# Patient Record
Sex: FEMALE | Race: WHITE | NOT HISPANIC OR LATINO | Employment: OTHER | URBAN - METROPOLITAN AREA
[De-identification: names, ages, dates, MRNs, and addresses within clinical notes are randomized per-mention and may not be internally consistent; named-entity substitution may affect disease eponyms.]

---

## 2017-07-19 ENCOUNTER — OFFICE VISIT (OUTPATIENT)
Dept: ORTHOPEDICS | Facility: CLINIC | Age: 77
End: 2017-07-19

## 2017-07-19 DIAGNOSIS — M17.11 PRIMARY OSTEOARTHRITIS OF RIGHT KNEE: Primary | ICD-10-CM

## 2017-07-19 NOTE — NURSING NOTE
[address] [address]  Dept: [phone]  ______________________________________________________________________________    Patient: Jessica Ramsey   : 1940   MRN: [id number]   2017    INVASIVE PROCEDURE SAFETY CHECKLIST    Date: 2017   Procedure:right knee kenalog injection  Patient Name: Jessica Ramsey  MRN: [id number]  YOB: 1940    Action: Complete sections as appropriate. Any discrepancy results in a HARD COPY until resolved.     PRE PROCEDURE:  Patient ID verified with 2 identifiers (name and  or MRN): Yes  Procedure and site verified with patient/designee (when able): Yes  Accurate consent documentation in medical record: Yes  H&P (or appropriate assessment) documented in medical record: Yes  H&P must be up to 20 days prior to procedure and updates within 24 hours of procedure as applicable: NA  Relevant diagnostic and radiology test results appropriately labeled and displayed as applicable: Yes  Procedure site(s) marked with provider initials: NA    TIMEOUT:  Time-Out performed immediately prior to starting procedure, including verbal and active participation of all team members addressing the following:Yes  * Correct patient identify  * Confirmed that the correct side and site are marked  * An accurate procedure consent form  * Agreement on the procedure to be done  * Correct patient position  * Relevant images and results are properly labeled and appropriately displayed  * The need to administer antibiotics or fluids for irrigation purposes during the procedure as applicable   * Safety precautions based on patient history or medication use    DURING PROCEDURE: Verification of correct person, site, and procedures any time the responsibility for care of the patient is transferred to another member of the care team.     The following medication was given:     MEDICATION:  Kenalog 40 mg  ROUTE:  intra-articular  SITE: right knee  DOSE: 40mg/ml  LOT #: [id number]  : [employer]  EXPIRATION DATE: 12/18  NDC#: 2690-9400-87   Was there drug waste? No    MEDICATION:  Lidocaine without epinephrine  ROUTE: intra-articular  SITE: right knee  DOSE: 200mg/20ml  LOT #: [id number]  : [employer]  EXPIRATION DATE: 03/21  NDC#: 05562-786-98   Was there drug waste? Yes  Amount of drug waste (mL): 16ml.  Reason for waste:  Single use vial    Vanesa Durán LPN  July 19, 2017

## 2017-07-19 NOTE — LETTER
7/19/2017      RE: Jessica Ramsey  [address]  [facility] [address]        Subjective:   Jessica Ramsey is a 76 year old female who reports baker's cyst, right knee pain.  Ibuprofen helps.  Usually better with Tylenol.  Pain with walking, now improving.    Background:   Date of injury: no injury  Duration of symptoms: 3 years  Mechanism of Injury: Chronic; Activity Related   Intensity: 1/10 at rest, 6/10 with activity   Aggravating factors: walking long distance  Relieving Factors: rest and ice  Prior Evaluation: X-rays and Injection    PAST MEDICAL, SOCIAL, SURGICAL AND FAMILY HISTORY: She  has a past medical history of Arthritis; Asthma; Degenerative joint disease; Depression; Hoarseness; Hypertension; Indigestion; Nasal congestion; Night sweats; Numbness; Pneumonia; Ringing in ears; Sleep problems; Snoring; Sore throat; Trouble swallowing; Unspecified cerebral artery occlusion with cerebral infarction; Weakness; and Weight gain. She also has no past medical history of Bleeding disorder (H); Cancer (H); Chronic kidney disease; Diabetes (H); Heart disease; Hepatitis; or Surgical complication.  She  has a past surgical history that includes Hysterectomy; orthopedic surgery; SACROPLASTY; STOMACH SURGERY PROCEDURE UNLISTED; and back surgery.  Her family history includes Asthma in her father; CEREBROVASCULAR DISEASE in her father; Low Back Problems in an other family member; Obesity in her father.  She reports that she has quit smoking. Her smoking use included Cigarettes. She has a 18.00 pack-year smoking history. She has never used smokeless tobacco. She reports that she does not drink alcohol or use illicit drugs.      ALLERGIES: She is allergic to lyrica [pregabalin]; niacin; sulfa drugs; and tramadol.    CURRENT MEDICATIONS: She has a current medication list which includes the following prescription(s): escitalopram oxalate, coenzyme q10, calcium & magnesium carbonates, vitamin d, tiotropium bromide  monohydrate, ibuprofen, multiple vitamins-minerals, naproxen, trazodone, order for dme, albuterol, losartan, simvastatin, omeprazole, albuterol, acetaminophen, albuterol, and aspirin.     REVIEW OF SYSTEMS: 10 point review of systems is negative except as noted above.     Exam:   There were no vitals taken for this visit.           CONSTITUTIONAL: healthy, alert, no distress and cooperative  HEAD: Normocephalic. No masses, lesions, tenderness or abnormalities  SKIN: no suspicious lesions or rashes  GAIT: normal  NEUROLOGIC: Non-focal, Normal muscle tone and strength, reflexes normal, sensation grossly normal.  PSYCHIATRIC: affect normal/bright and mentation appears normal.    MUSCULOSKELETAL: right knee pain      Inspection:       AP/lateral alignment normal  Tender: medial joint line, posterior knee, posterior calf      Non-tender: patellar facets, MCL, LCL, lateral joint line, IT band, posterior knee   Active Range of Motion: all normal  Strength: quad  5/5, Hamstrings  5/5, Gastroc  5/5, Tibialis anterior  5-/5, Peroneals  5-/5 and core strength  5/5 hip abductors and other core muscles   Special tests: normal Valgus stress test, normal Varus, negative Lachman's test, negative Edward's, no apprehension with lateral stress of the patella.    Procedure: risks and benefits discussed, consented, 1 cc 40mg kenalog, 4 cc 1% lidocaine, lateral approach, right knee injection, no bleeding or complications, some relief post-injection, band aid     Assessment/Plan:   Pt is a 75 yo obese white female with PMHx of asthma, depression presenting with right knee pain  1. Right knee OA- injection today, pt to continue with LE strengthening, trying to avoid surgery at this point  Has an appt with Dr. Fernandes in August.  Discussion regarding trial of Synvisc.  Pt to check with insurance to see if it's covered.  RTC August    X-RAY INTERPRETATION:   X-Ray of the Right Knee: 3-view, Quintero, lateral, sunrise   ordered and  interpreted in the office today was positive for Impression:  1. Right mechanical axis angle measures 2 degrees and left measures 3  degrees.  2. Weight bearing axis as noted above.  3. Moderate grade osteoarthritis of bilateral knees most predominantly  in the medial femorotibial joint compartment,.       Carmelina Franco MD

## 2017-07-19 NOTE — MR AVS SNAPSHOT
After Visit Summary   7/19/2017    Jessica Ramsey    MRN: [id number]           Patient Information     Date Of Birth          1940        Visit Information        Provider Department      7/19/2017 11:00 AM Carmelina Franco MD [facility]        Today's Diagnoses     Primary osteoarthritis of right knee    -  1       Follow-ups after your visit        Follow-up notes from your care team     Return in about 2 years (around 7/19/2019), or if symptoms worsen or fail to improve.      Your next 10 appointments already scheduled     Aug 25, 2017  2:00 PM CDT   (Arrive by 1:45 PM)   Return Visit with Jill Fernandes MD   [facility] ([facility])    [address] [address] [phone]              Who to contact     Please call your clinic at [phone] to:    Ask questions about your health    Make or cancel appointments    Discuss your medicines    Learn about your test results    Speak to your doctor   If you have compliments or concerns about an experience at your clinic, or if you wish to file a complaint, please contact [facility] Patient Relations at [phone] or email us at [email or web address]         Additional Information About Your Visit        MyChart Information     StatusNet gives you secure access to your electronic health record. If you see a primary care provider, you can also send messages to your care team and make appointments. If you have questions, please call your primary care clinic.  If you do not have a primary care provider, please call [phone] and they will assist you.      StatusNet is an electronic gateway that provides easy, online access to your medical records. With StatusNet, you can request a clinic appointment, read your test results, renew a prescription or communicate with your care team.     To access your  existing account, please contact your [facility] or call [phone] for assistance.        Care EveryWhere ID     This is your Care EveryWhere ID. This could be used by other organizations to access your [facility] medical records  [id number]         Blood Pressure from Last 3 Encounters:   04/13/15 118/78   03/17/15 122/86   03/09/15 125/85    Weight from Last 3 Encounters:   11/30/16 200 lb (90.7 kg)   11/11/15 229 lb (103.9 kg)   10/28/15 229 lb (103.9 kg)              We Performed the Following     Large Joint/Bursa injection and/or drainage - Unilateral (Shoulder, Knee) [20610]     TRIAMCINOLONE ACET INJ NOS          Today's Medication Changes          These changes are accurate as of: 7/19/17 11:59 PM.  If you have any questions, ask your nurse or doctor.               Start taking these medicines.        Dose/Directions    triamcinolone acetonide 40 MG/ML injection   Commonly known as:  KENALOG   Used for:  Primary osteoarthritis of right knee   Started by:  Carmelina Franco MD        Dose:  40 mg   1 mL (40 mg) by INTRA-ARTICULAR route once for 1 dose   Quantity:  1 mL   Refills:  0         Stop taking these medicines if you haven't already. Please contact your care team if you have questions.     celeXA 20 MG tablet   Generic drug:  citalopram   Stopped by:  Carmelina Franco MD                Where to get your medicines      Some of these will need a paper prescription and others can be bought over the counter.  Ask your nurse if you have questions.     You don't need a prescription for these medications     triamcinolone acetonide 40 MG/ML injection                Primary Care Provider Office Phone # Fax #    Suraj Espinoza [phone]       [facility] [address] [facility] [address]        Equal Access to Services     DEBORAH LOCKHART : Rich Puentes, tali olmos  shante navarroisidro fernando'aan ah. So [facility] [phone].    ATENCIÓN: Si luisla jaswant, tiene a oscar disposición servicios gratuitos de asistencia lingüística. Radha al [phone].    We comply with applicable federal civil rights laws and [place] laws. We do not discriminate on the basis of race, color, national origin, age, disability sex, sexual orientation or gender identity.            Thank you!     Thank you for choosing [facility]  for your care. Our goal is always to provide you with excellent care. Hearing back from our patients is one way we can continue to improve our services. Please take a few minutes to complete the written survey that you may receive in the mail after your visit with us. Thank you!             Your Updated Medication List - Protect others around you: Learn how to safely use, store and throw away your medicines at www.disposemymeds.org.          This list is accurate as of: 7/19/17 11:59 PM.  Always use your most recent med list.                   Brand Name Dispense Instructions for use Diagnosis    acetaminophen 650 MG CR tablet    TYLENOL     Take 650 mg by mouth 4 times daily as needed.        * albuterol 108 (90 BASE) MCG/ACT Inhaler   Generic drug:  albuterol     2 Inhaler    INHALE 1 TO 2 PUFFS EVERY 4 TO 6 HOURS AS NEEDED    Bronchospasm       * albuterol 0.63 MG/3ML nebulizer solution    ACCUNEB    1 Box    Take 3 mLs by nebulization every 6 hours as needed for shortness of breath / dyspnea. Use 1 unit dose in nebulizer every 4-6 hours as needed    COPD (chronic obstructive pulmonary disease) (H)       * albuterol (2.5 MG/3ML) 0.083% neb solution     30 vial    Take 3 mLs by nebulization every 6 hours as needed for shortness of breath / dyspnea for 30 doses.    Wheezing       aspirin 325 MG tablet      Take 325 mg by mouth daily.        CALCIUM & MAGNESIUM CARBONATES PO           COENZYME Q-10 PO           IBUPROFEN PO      Take 200 mg by  mouth Take three tablets as needed for pain, once or twice a day everyday        LEXAPRO PO      Take 20 mg by mouth daily        losartan 25 MG tablet    COZAAR    90 tablet    Take 1 tablet by mouth daily.    Unspecified essential hypertension       naproxen 500 MG tablet    NAPROSYN    60 tablet    Take 1 tablet (500 mg) by mouth 2 times daily (with meals)    Right shoulder pain       omeprazole 20 MG CR capsule    priLOSEC    180 capsule    Take 1 capsule by mouth 2 times daily.    Esophageal reflux       order for DME     1 each    Equipment being ordered: Nebulizer    Wheezing       PRESERVISION AREDS PO      Take 1 tablet by mouth daily        simvastatin 40 MG tablet    ZOCOR    90 tablet    Take 1 tablet by mouth At Bedtime.    Other and unspecified hyperlipidemia       SPIRIVA HANDIHALER IN      Once daily inhale into lungs        traZODone 100 MG tablet    DESYREL    30 tablet    Take 1 tablet by mouth nightly as needed for sleep. As directed    Insomnia, unspecified       triamcinolone acetonide 40 MG/ML injection    KENALOG    1 mL    1 mL (40 mg) by INTRA-ARTICULAR route once for 1 dose    Primary osteoarthritis of right knee       vitamin D 68186 UNIT capsule    ERGOCALCIFEROL    12 capsule    Take 1 capsule (50,000 Units) by mouth once a week    Vitamin D deficiency disease       * Notice:  This list has 3 medication(s) that are the same as other medications prescribed for you. Read the directions carefully, and ask your doctor or other care provider to review them with you.

## 2017-07-19 NOTE — PROGRESS NOTES
Subjective:   Jessica Ramsey is a 76 year old female who reports baker's cyst, right knee pain.  Ibuprofen helps.  Usually better with Tylenol.  Pain with walking, now improving.    Background:   Date of injury: no injury  Duration of symptoms: 3 years  Mechanism of Injury: Chronic; Activity Related   Intensity: 1/10 at rest, 6/10 with activity   Aggravating factors: walking long distance  Relieving Factors: rest and ice  Prior Evaluation: X-rays and Injection    PAST MEDICAL, SOCIAL, SURGICAL AND FAMILY HISTORY: She  has a past medical history of Arthritis; Asthma; Degenerative joint disease; Depression; Hoarseness; Hypertension; Indigestion; Nasal congestion; Night sweats; Numbness; Pneumonia; Ringing in ears; Sleep problems; Snoring; Sore throat; Trouble swallowing; Unspecified cerebral artery occlusion with cerebral infarction; Weakness; and Weight gain. She also has no past medical history of Bleeding disorder (H); Cancer (H); Chronic kidney disease; Diabetes (H); Heart disease; Hepatitis; or Surgical complication.  She  has a past surgical history that includes Hysterectomy; orthopedic surgery; SACROPLASTY; STOMACH SURGERY PROCEDURE UNLISTED; and back surgery.  Her family history includes Asthma in her father; CEREBROVASCULAR DISEASE in her father; Low Back Problems in an other family member; Obesity in her father.  She reports that she has quit smoking. Her smoking use included Cigarettes. She has a 18.00 pack-year smoking history. She has never used smokeless tobacco. She reports that she does not drink alcohol or use illicit drugs.      ALLERGIES: She is allergic to lyrica [pregabalin]; niacin; sulfa drugs; and tramadol.    CURRENT MEDICATIONS: She has a current medication list which includes the following prescription(s): escitalopram oxalate, coenzyme q10, calcium & magnesium carbonates, vitamin d, tiotropium bromide monohydrate, ibuprofen, multiple vitamins-minerals, naproxen, trazodone, order for dme,  albuterol, losartan, simvastatin, omeprazole, albuterol, acetaminophen, albuterol, and aspirin.     REVIEW OF SYSTEMS: 10 point review of systems is negative except as noted above.     Exam:   There were no vitals taken for this visit.           CONSTITUTIONAL: healthy, alert, no distress and cooperative  HEAD: Normocephalic. No masses, lesions, tenderness or abnormalities  SKIN: no suspicious lesions or rashes  GAIT: normal  NEUROLOGIC: Non-focal, Normal muscle tone and strength, reflexes normal, sensation grossly normal.  PSYCHIATRIC: affect normal/bright and mentation appears normal.    MUSCULOSKELETAL: right knee pain      Inspection:       AP/lateral alignment normal  Tender: medial joint line, posterior knee, posterior calf      Non-tender: patellar facets, MCL, LCL, lateral joint line, IT band, posterior knee   Active Range of Motion: all normal  Strength: quad  5/5, Hamstrings  5/5, Gastroc  5/5, Tibialis anterior  5-/5, Peroneals  5-/5 and core strength  5/5 hip abductors and other core muscles   Special tests: normal Valgus stress test, normal Varus, negative Lachman's test, negative Edward's, no apprehension with lateral stress of the patella.    Procedure: risks and benefits discussed, consented, 1 cc 40mg kenalog, 4 cc 1% lidocaine, lateral approach, right knee injection, no bleeding or complications, some relief post-injection, band aid     Assessment/Plan:   Pt is a 77 yo obese white female with PMHx of asthma, depression presenting with right knee pain  1. Right knee OA- injection today, pt to continue with LE strengthening, trying to avoid surgery at this point  Has an appt with Dr. Fernandes in August.  Discussion regarding trial of Synvisc.  Pt to check with insurance to see if it's covered.  RTC August    X-RAY INTERPRETATION:   X-Ray of the Right Knee: 3-view, Quintero, lateral, sunrise   ordered and interpreted in the office today was positive for Impression:  1. Right mechanical axis angle  measures 2 degrees and left measures 3  degrees.  2. Weight bearing axis as noted above.  3. Moderate grade osteoarthritis of bilateral knees most predominantly  in the medial femorotibial joint compartment,.

## 2017-07-20 RX ORDER — TRIAMCINOLONE ACETONIDE 40 MG/ML
40 INJECTION, SUSPENSION INTRA-ARTICULAR; INTRAMUSCULAR ONCE
Qty: 1 ML | Refills: 0 | OUTPATIENT
Start: 2017-07-20 | End: 2017-07-20

## 2017-08-08 ENCOUNTER — TRANSFERRED RECORDS (OUTPATIENT)
Dept: HEALTH INFORMATION MANAGEMENT | Facility: CLINIC | Age: 77
End: 2017-08-08

## 2017-08-12 ENCOUNTER — HEALTH MAINTENANCE LETTER (OUTPATIENT)
Age: 77
End: 2017-08-12

## 2017-08-25 ENCOUNTER — OFFICE VISIT (OUTPATIENT)
Dept: ORTHOPEDICS | Facility: CLINIC | Age: 77
End: 2017-08-25

## 2017-08-25 VITALS — WEIGHT: 225 LBS | HEIGHT: 64 IN | BODY MASS INDEX: 38.41 KG/M2

## 2017-08-25 DIAGNOSIS — M17.12 TRICOMPARTMENT OSTEOARTHRITIS OF LEFT KNEE: Primary | ICD-10-CM

## 2017-08-25 RX ORDER — TRIAMCINOLONE ACETONIDE 40 MG/ML
40 INJECTION, SUSPENSION INTRA-ARTICULAR; INTRAMUSCULAR ONCE
Qty: 1 ML | Refills: 0 | OUTPATIENT
Start: 2017-08-25 | End: 2017-08-25

## 2017-08-25 NOTE — PROGRESS NOTES
"ANNMARIE Lopez is a 76-year-old female with a known past medical history significant for bilateral tricompartmental osteoarthritis and presents to clinic with worsened left knee pain ×2 weeks. She states 2 weeks ago she was in her usual state of health walking up the stairs when she fell backwards forcibly planting her left leg and experiencing knee pain since that time. She was evaluated at [facility] orthopedics including left knee x-rays which showed no acute bony abnormality. Ligamentous structures were intact. She was released to home. Since that time she has been taking a small amount of ibuprofen and Tylenol in the morning with minimal improvement of pain. The knee swelling has improved but she continues to have pain with activities of daily living and normal ambulation especially with getting up and down from a seated position. She's not had any locking or catching or weakness. No bruising or redness. She denies any hip pain.    Medical History     Past Surgical History:   Procedure Laterality Date     BACK SURGERY       C STOMACH SURGERY PROCEDURE UNLISTED       HC SACROPLASTY       HYSTERECTOMY       ORTHOPEDIC SURGERY         Past Medical History:   Diagnosis Date     Arthritis      Asthma      Degenerative joint disease      Depression      Hoarseness      Hypertension      Indigestion      Nasal congestion      Night sweats      Numbness      Pneumonia      Ringing in ears      Sleep problems      Snoring      Sore throat      Trouble swallowing      Unspecified cerebral artery occlusion with cerebral infarction      Weakness      Weight gain        Allergies   Allergen Reactions     Lyrica [Pregabalin]      \"felt like I was high and wanted to sleep a lot and did not help with the pain.\"     Niacin      Rapid heartbeat     Sulfa Drugs      Unknown     Tramadol      Dizziness        Social History     Social History     Marital status:      Spouse name: N/A     Number of children: N/A     Years of " "education: N/A     Occupational History     Not on file.     Social History Main Topics     Smoking status: Former Smoker     Packs/day: 1.00     Years: 18.00     Types: Cigarettes     Smokeless tobacco: Never Used     Alcohol use No     Drug use: No     Sexual activity: No     Other Topics Concern     Not on file     Social History Narrative       Family History   Problem Relation Age of Onset     Asthma Father      CEREBROVASCULAR DISEASE Father      Obesity Father      Low Back Problems Other               Current Outpatient Prescriptions   Medication     Escitalopram Oxalate (LEXAPRO PO)     COENZYME Q-10 PO     CALCIUM & MAGNESIUM CARBONATES PO     vitamin D (ERGOCALCIFEROL) 20242 UNIT capsule     Tiotropium Bromide Monohydrate (SPIRIVA HANDIHALER IN)     IBUPROFEN PO     Multiple Vitamins-Minerals (PRESERVISION AREDS PO)     naproxen (NAPROSYN) 500 MG tablet     traZODone (DESYREL) 100 MG tablet     ORDER FOR DME     albuterol (2.5 MG/3ML) 0.083% nebulizer solution     losartan (COZAAR) 25 MG tablet     simvastatin (ZOCOR) 40 MG tablet     omeprazole (PRILOSEC) 20 MG capsule     albuterol (ACCUNEB) 0.63 MG/3ML nebulizer solution     acetaminophen (TYLENOL) 650 MG CR tablet     PROAIR  (90 BASE) MCG/ACT IN AERS     aspirin 325 MG tablet     No current facility-administered medications for this visit.            Objective Findings     Vitals:    08/25/17 1356   Weight: 225 lb (102.1 kg)   Height: 5' 4\" (1.626 m)         Physical Exam:  Gen: A&O in NAD  CV: regular rate, extremities well perfused x4  Pulm: regular rate, without respiratory distress  Derm: no rashes or lesions  Psych: normal affect    MSK: Left knee without obvious effusion, redness or deformity. No bruising. Moderate tenderness to palpation over bilateral joint lines, right greater than left.. No significant tenderness over the patellar or quadriceps tendon, has anserine bursa, fibular head, MCL, or anterior cruciate ligament. Mild " crepitus with patellar grind. Flexion is limited on the right to 110   with 0  of flexion bilaterally.  Patient has 5/5 strength bilaterally. Firm ligamentous endpoints bilaterally.    Procedure Note  Procedure/Location: Left knee corticosteroid injection  Approach: Anterior lateral approach  Prior to procedure, risks and benefits explained to patient and written consent obtained.  Injectate: 1 mL Kenalog, 4 mL Lidocaine 1% w/o epinephrine using a 22 g needle  Prior to injection, skin site was sterilized.  Injection was then administered in sterile fashion without complication.  Pt experienced moderate relief of symptoms immediately following procedure.  Bandage was placed over site.  Post procedure instructions discussed with patient.        Assessment / Plan     1) left knee pain and swelling: Likely due to exacerbation of underlying moderate to severe osteoarthritis  - Corticosteroid induction as above  - Recommend physical therapy  - Recommend naproxen 440 mg twice a day  - RTC in 6 weeks if no improvement  - Could consider Synvisc injection in the future if clinically indicated     Patient seen and discussed with MD Zbigniew Onofre MD  Primary Care Sports Medicine Fellow

## 2017-08-25 NOTE — LETTER
"  8/25/2017      RE: Jessica Ramsey  [address]  [facility] [address]       [facility]      Jessica is a 76-year-old female with a known past medical history significant for bilateral tricompartmental osteoarthritis and presents to clinic with worsened left knee pain ×2 weeks. She states 2 weeks ago she was in her usual state of health walking up the stairs when she fell backwards forcibly planting her left leg and experiencing knee pain since that time. She was evaluated at [facility] orthopedics including left knee x-rays which showed no acute bony abnormality. Ligamentous structures were intact. She was released to home. Since that time she has been taking a small amount of ibuprofen and Tylenol in the morning with minimal improvement of pain. The knee swelling has improved but she continues to have pain with activities of daily living and normal ambulation especially with getting up and down from a seated position. She's not had any locking or catching or weakness. No bruising or redness. She denies any hip pain.    Medical History     Past Surgical History:   Procedure Laterality Date     BACK SURGERY       C STOMACH SURGERY PROCEDURE UNLISTED       HC SACROPLASTY       HYSTERECTOMY       ORTHOPEDIC SURGERY         Past Medical History:   Diagnosis Date     Arthritis      Asthma      Degenerative joint disease      Depression      Hoarseness      Hypertension      Indigestion      Nasal congestion      Night sweats      Numbness      Pneumonia      Ringing in ears      Sleep problems      Snoring      Sore throat      Trouble swallowing      Unspecified cerebral artery occlusion with cerebral infarction      Weakness      Weight gain        Allergies   Allergen Reactions     Lyrica [Pregabalin]      \"felt like I was high and wanted to sleep a lot and did not help with the pain.\"     Niacin      Rapid heartbeat     Sulfa Drugs      Unknown     Tramadol      Dizziness        Social History     Social History     " "Marital status:      Spouse name: N/A     Number of children: N/A     Years of education: N/A     Occupational History     Not on file.     Social History Main Topics     Smoking status: Former Smoker     Packs/day: 1.00     Years: 18.00     Types: Cigarettes     Smokeless tobacco: Never Used     Alcohol use No     Drug use: No     Sexual activity: No     Other Topics Concern     Not on file     Social History Narrative       Family History   Problem Relation Age of Onset     Asthma Father      CEREBROVASCULAR DISEASE Father      Obesity Father      Low Back Problems Other               Current Outpatient Prescriptions   Medication     Escitalopram Oxalate (LEXAPRO PO)     COENZYME Q-10 PO     CALCIUM & MAGNESIUM CARBONATES PO     vitamin D (ERGOCALCIFEROL) 37296 UNIT capsule     Tiotropium Bromide Monohydrate (SPIRIVA HANDIHALER IN)     IBUPROFEN PO     Multiple Vitamins-Minerals (PRESERVISION AREDS PO)     naproxen (NAPROSYN) 500 MG tablet     traZODone (DESYREL) 100 MG tablet     ORDER FOR DME     albuterol (2.5 MG/3ML) 0.083% nebulizer solution     losartan (COZAAR) 25 MG tablet     simvastatin (ZOCOR) 40 MG tablet     omeprazole (PRILOSEC) 20 MG capsule     albuterol (ACCUNEB) 0.63 MG/3ML nebulizer solution     acetaminophen (TYLENOL) 650 MG CR tablet     PROAIR  (90 BASE) MCG/ACT IN AERS     aspirin 325 MG tablet     No current facility-administered medications for this visit.            Objective Findings     Vitals:    08/25/17 1356   Weight: 225 lb (102.1 kg)   Height: 5' 4\" (1.626 m)         Physical Exam:  Gen: A&O in NAD  CV: regular rate, extremities well perfused x4  Pulm: regular rate, without respiratory distress  Derm: no rashes or lesions  Psych: normal affect    MSK: Left knee without obvious effusion, redness or deformity. No bruising. Moderate tenderness to palpation over bilateral joint lines, right greater than left.. No significant tenderness over the patellar or quadriceps " tendon, has anserine bursa, fibular head, MCL, or anterior cruciate ligament. Mild crepitus with patellar grind. Flexion is limited on the right to 110    with 0  of flexion bilaterally.  Patient has 5/5 strength bilaterally. Firm ligamentous endpoints bilaterally.    Procedure Note  Procedure/Location: Left knee corticosteroid injection  Approach: Anterior lateral approach  Prior to procedure, risks and benefits explained to patient and written consent obtained.  Injectate: 1 mL Kenalog, 4 mL Lidocaine 1% w/o epinephrine using a 22 g needle  Prior to injection, skin site was sterilized.  Injection was then administered in sterile fashion without complication.  Pt experienced moderate relief of symptoms immediately following procedure.  Bandage was placed over site.  Post procedure instructions discussed with patient.        Assessment / Plan     1) left knee pain and swelling: Likely due to exacerbation of underlying moderate to severe osteoarthritis  - Corticosteroid induction as above  - Recommend physical therapy  - Recommend naproxen 440 mg twice a day  - RTC in 6 weeks if no improvement  - Could consider Synvisc injection in the future if clinically indicated     Patient seen and discussed with MD Zbigniew Onofre MD  Primary Care Sports Medicine Fellow      Attending Note:   I have personally examined this patient and have reviewed the clinical presentation and progress note with the fellow. I agree with the treatment plan as outlined. The plan was formulated with the fellow on the day of the patient's visit. I have reviewed all imaging with the fellow and agree with the findings in the documentation.  I have supervised the injection performed by Dr. Nick.      Jill Fernandes MD, CAQ, CCD  [facility]  Sports Medicine and Bone Health    Jill Fernandes MD

## 2017-08-25 NOTE — MR AVS SNAPSHOT
After Visit Summary   8/25/2017    Jessica Ramsey    MRN: [id number]           Patient Information     Date Of Birth          1940        Visit Information        Provider Department      8/25/2017 2:00 PM Jill Fernandes MD [facility] Sports Medicine        Today's Diagnoses     Tricompartment osteoarthritis of left knee    -  1       Follow-ups after your visit        Additional Services     PHYSICAL THERAPY REFERRAL (Internal)       Physical Therapy Referral                  Who to contact     Please call your clinic at [phone] to:    Ask questions about your health    Make or cancel appointments    Discuss your medicines    Learn about your test results    Speak to your doctor   If you have compliments or concerns about an experience at your clinic, or if you wish to file a complaint, please contact [facility] Patient Relations at [phone] or email us at [email or web address]         Additional Information About Your Visit        MyChart Information     [employer] gives you secure access to your electronic health record. If you see a primary care provider, you can also send messages to your care team and make appointments. If you have questions, please call your primary care clinic.  If you do not have a primary care provider, please call [phone] and they will assist you.      [employer] is an electronic gateway that provides easy, online access to your medical records. With [employer], you can request a clinic appointment, read your test results, renew a prescription or communicate with your care team.     To access your existing account, please contact your [facility] or call [phone] for assistance.        Care EveryWhere ID     This is your Care EveryWhere ID. This could be used by other organizations to access your [facility] medical records  [id number]        Your Vitals Were     Height BMI (Body  "Mass Index)                1.626 m (5' 4\") 38.62 kg/m2           Blood Pressure from Last 3 Encounters:   04/13/15 118/78   03/17/15 122/86   03/09/15 125/85    Weight from Last 3 Encounters:   08/25/17 102.1 kg (225 lb)   11/30/16 90.7 kg (200 lb)   11/11/15 103.9 kg (229 lb)              We Performed the Following     Large Joint/Bursa injection and/or drainage - Unilateral (Shoulder, Knee) [20610]     PHYSICAL THERAPY REFERRAL (Internal)     TRIAMCINOLONE ACET INJ NOS          Today's Medication Changes          These changes are accurate as of: 8/25/17 11:59 PM.  If you have any questions, ask your nurse or doctor.               Start taking these medicines.        Dose/Directions    triamcinolone acetonide 40 MG/ML injection   Commonly known as:  KENALOG   Used for:  Tricompartment osteoarthritis of left knee   Started by:  Jill Fernandes MD        Dose:  40 mg   1 mL (40 mg) by INTRA-ARTICULAR route once for 1 dose   Quantity:  1 mL   Refills:  0            Where to get your medicines      Some of these will need a paper prescription and others can be bought over the counter.  Ask your nurse if you have questions.     You don't need a prescription for these medications     triamcinolone acetonide 40 MG/ML injection                Primary Care Provider Office Phone # Fax #    Suraj Espinoza [phone]       [facility] [address] [address]        Equal Access to Services     DEBORAH LOCKHART AH: Hadii luci ku hadasho Soomaali, waaxda luqadaha, qaybta kaalmada adeegyada, waxay jose berg. So [facility] [phone].    ATENCIÓN: Si habla chavaañol, tiene a oscar disposición servicios gratuitos de asistencia lingüística. Llame al [phone].    We comply with applicable federal civil rights laws and [place] laws. We do not discriminate on the basis of race, color, national origin, age, disability sex, sexual orientation or " gender identity.            Thank you!     Thank you for choosing [facility]  for your care. Our goal is always to provide you with excellent care. Hearing back from our patients is one way we can continue to improve our services. Please take a few minutes to complete the written survey that you may receive in the mail after your visit with us. Thank you!             Your Updated Medication List - Protect others around you: Learn how to safely use, store and throw away your medicines at www.disposemymeds.org.          This list is accurate as of: 8/25/17 11:59 PM.  Always use your most recent med list.                   Brand Name Dispense Instructions for use Diagnosis    acetaminophen 650 MG CR tablet    TYLENOL     Take 650 mg by mouth 4 times daily as needed.        aspirin 325 MG tablet      Take 325 mg by mouth daily.        CALCIUM & MAGNESIUM CARBONATES PO           COENZYME Q-10 PO           IBUPROFEN PO      Take 200 mg by mouth Take three tablets as needed for pain, once or twice a day everyday        LEXAPRO PO      Take 20 mg by mouth daily        losartan 25 MG tablet    COZAAR    90 tablet    Take 1 tablet by mouth daily.    Unspecified essential hypertension       naproxen 500 MG tablet    NAPROSYN    60 tablet    Take 1 tablet (500 mg) by mouth 2 times daily (with meals)    Right shoulder pain       omeprazole 20 MG CR capsule    priLOSEC    180 capsule    Take 1 capsule by mouth 2 times daily.    Esophageal reflux       order for DME     1 each    Equipment being ordered: Nebulizer    Wheezing       PRESERVISION AREDS PO      Take 1 tablet by mouth daily        * PROAIR  (90 BASE) MCG/ACT Inhaler   Generic drug:  albuterol     2 Inhaler    INHALE 1 TO 2 PUFFS EVERY 4 TO 6 HOURS AS NEEDED    Bronchospasm       * albuterol 0.63 MG/3ML nebulizer solution    ACCUNEB    1 Box    Take 3 mLs by nebulization every 6 hours as needed for shortness of breath / dyspnea. Use 1 unit dose in  nebulizer every 4-6 hours as needed    COPD (chronic obstructive pulmonary disease) (H)       * albuterol (2.5 MG/3ML) 0.083% neb solution     30 vial    Take 3 mLs by nebulization every 6 hours as needed for shortness of breath / dyspnea for 30 doses.    Wheezing       simvastatin 40 MG tablet    ZOCOR    90 tablet    Take 1 tablet by mouth At Bedtime.    Other and unspecified hyperlipidemia       SPIRIVA HANDIHALER IN      Once daily inhale into lungs        traZODone 100 MG tablet    DESYREL    30 tablet    Take 1 tablet by mouth nightly as needed for sleep. As directed    Insomnia, unspecified       triamcinolone acetonide 40 MG/ML injection    KENALOG    1 mL    1 mL (40 mg) by INTRA-ARTICULAR route once for 1 dose    Tricompartment osteoarthritis of left knee       vitamin D 76260 UNIT capsule    ERGOCALCIFEROL    12 capsule    Take 1 capsule (50,000 Units) by mouth once a week    Vitamin D deficiency disease       * Notice:  This list has 3 medication(s) that are the same as other medications prescribed for you. Read the directions carefully, and ask your doctor or other care provider to review them with you.

## 2017-08-25 NOTE — NURSING NOTE
[address] [address]  Dept: [phone]  ______________________________________________________________________________    Patient: Jessica Ramsey   : 1940   MRN: [id number]   2017    INVASIVE PROCEDURE SAFETY CHECKLIST    Date: 2017   Procedure: Left knee CSI with a Kenalog   Patient Name: Jessica Ramsey  MRN: [id number]  YOB: 1940    Action: Complete sections as appropriate. Any discrepancy results in a HARD COPY until resolved.     PRE PROCEDURE:  Patient ID verified with 2 identifiers (name and  or MRN): Yes  Procedure and site verified with patient/designee (when able): Yes  Accurate consent documentation in medical record: Yes  H&P (or appropriate assessment) documented in medical record: Yes  H&P must be up to 20 days prior to procedure and updates within 24 hours of procedure as applicable: NA  Relevant diagnostic and radiology test results appropriately labeled and displayed as applicable: Yes  Procedure site(s) marked with provider initials: NA    TIMEOUT:  Time-Out performed immediately prior to starting procedure, including verbal and active participation of all team members addressing the following:Yes  * Correct patient identify  * Confirmed that the correct side and site are marked  * An accurate procedure consent form  * Agreement on the procedure to be done  * Correct patient position  * Relevant images and results are properly labeled and appropriately displayed  * The need to administer antibiotics or fluids for irrigation purposes during the procedure as applicable   * Safety precautions based on patient history or medication use    DURING PROCEDURE: Verification of correct person, site, and procedures any time the responsibility for care of the patient is transferred to another member of the care team.     The following medication was given:     MEDICATION:  Kenalog 40 mg  ROUTE:  Intraarticular  SITE: Left knee  DOSE: 1cc  LOT #: [id number]  : [employer]  EXPIRATION DATE: FEB2019  NDC#: 0155-7305-93   Was there drug waste? No     MEDICATION:  Lidocaine without epinephrine  ROUTE: Intraarticular  SITE: Left knee  DOSE: 4cc  LOT #: [id number]  : [employer]  EXPIRATION DATE: 03/21  NDC#: 58207-191-71   Was there drug waste? Yes  Amount of drug waste (mL): 16.  Reason for waste:  Single use vial      Rosa Cornelius, ATC  August 25, 2017

## 2017-08-28 ENCOUNTER — TELEPHONE (OUTPATIENT)
Dept: ORTHOPEDICS | Facility: CLINIC | Age: 77
End: 2017-08-28

## 2017-08-28 NOTE — TELEPHONE ENCOUNTER
"----- Message from Shannan Mustafa RN sent at 8/28/2017  3:29 PM CDT -----  Contact: [phone]  Jessica [place] called in today stating that at her appointment on 8/25/2017 with Dr. Fernandes, that she would be getting orders to go to pool therapy. She received a call today to schedule\"regular therapy\", but would like  pool therapy. I checked the order, and it does not specify pool therapy.  She is requesting  [facility] to have the therapy.  Thank you,  Shannan Hogan Triage  "

## 2017-08-28 NOTE — TELEPHONE ENCOUNTER
Left voicemail for patient, requesting a call back regarding the physical therapy/pool therapy orders. She should be informed that there is no mention of pool therapy in Dr. Fernandes's office note. Callback number was left.

## 2017-08-28 NOTE — TELEPHONE ENCOUNTER
"    [facility]:  Nursing Note  SITUATION                                                      Jessica Ramsey is a 76 year old female who calls with a medication question and a request for pool therapy orders.    BACKGROUND                                                      Patient is is being treated for increased left knee pain ×2 weeks. She has a history of osteoarthritis in both knees.     Date of last clinic appointment: on 8/25/2017 with Dr. Fernandes    Does patient have a future appointment scheduled?  No        ASSESSMENT    Jessica called today with concern about the naproxen she was prescribed. She states she took the med as directed on Friday 8/25/2017 and Saturday. Her  face became red and she got dizzy. She denies having shortness of breath or a feeling like she could not breathe. She denies getting a raised rash, hives, or itching. Jessica states she stopped taking the med and her face has cleared up. She is inquiring about a drug interaction with her chronic meds.  She also stated that at the same appointment that pool therapy was going to be ordered for her. She received a call today to get scheduled for \"regular therapy\" and she would like pool therapy. The order was checked, and it does not specify pool therapy.    PLAN                                                      Nursing Interventions: Recommended patient call her pharmacist that fills her chronic prescriptions and inquire about the drug interaction.   Message left for Dom Bynum's team for an order for pool therapy.  RECOMMENDED DISPOSITION: Call pharmacist  Will comply with recommendation: Yes    Patient/family can be reached at: [phone].  Okay to leave a detailed message at this number?  Yes    If further questions/concerns or if symptoms do not improve, worsen or new symptoms develop, patient/family are advised to call [phone], option #3 to speak with a triage nurse, as soon as possible.    Shannan Mustafa  "

## 2017-08-29 NOTE — PROGRESS NOTES
Attending Note:   I have personally examined this patient and have reviewed the clinical presentation and progress note with the fellow. I agree with the treatment plan as outlined. The plan was formulated with the fellow on the day of the patient's visit. I have reviewed all imaging with the fellow and agree with the findings in the documentation.  I have supervised the injection performed by Dr. Nick.      Jill Fernandes MD, CAQ, CCD  [facility]  Sports Medicine and Bone Health

## 2017-09-14 ENCOUNTER — TELEPHONE (OUTPATIENT)
Dept: ORTHOPEDICS | Facility: CLINIC | Age: 77
End: 2017-09-14

## 2017-09-14 NOTE — TELEPHONE ENCOUNTER
Sports Medicine pt of Dr. Fernandes last seen 8/25. Jessica had a cortisone injection to her (L) knee recently but only got relief for about 1.5 weeks. She's interested in the series of injections talked about at the last visit but she needs more information so she can check coverage with insurance co. She also requests an order for pool therapy. Will forward these requests to Dr. Fernandes's team. In Basket message sent.

## 2017-09-14 NOTE — TELEPHONE ENCOUNTER
Called patient back to inform her of all of the varius forms of injections (supartz, gel one, synvisc, euflexxa, etc) also informed her that  didn't mention pool therapy in her notes but I will ask . A call back number was left.

## 2017-09-19 ENCOUNTER — TELEPHONE (OUTPATIENT)
Dept: ORTHOPEDICS | Facility: CLINIC | Age: 77
End: 2017-09-19

## 2017-09-19 DIAGNOSIS — M17.0 BILATERAL PRIMARY OSTEOARTHRITIS OF KNEE: Primary | ICD-10-CM

## 2017-09-19 DIAGNOSIS — M51.369 DEGENERATIVE DISC DISEASE, LUMBAR: ICD-10-CM

## 2017-09-19 NOTE — TELEPHONE ENCOUNTER
Called patient back from her phone call today with regards to pool therapy. I informed her that / prescribed PT, but didn't mention pool therapy in their notes. I let her knwo that I can't just order pool therapy and I forwarded the message on to  to get clearance for pool therapy. A call back number was left,if patient had further questions.

## 2017-09-19 NOTE — TELEPHONE ENCOUNTER
Called patient back and left voice message stating that  approved order for Pool Therapy. I asked her if she wanted it mailed or faxed to a particular clinic. Also told her to check with insurance about our visco supplement options.

## 2017-09-19 NOTE — TELEPHONE ENCOUNTER
"----- Message from Jill Fernandes MD sent at 9/19/2017  1:51 PM CDT -----  Regarding: RE: call back - assistance with follow up requests  Contact: [phone]  Yes to the Pool PT for knee arthritis and lumbar DDD #8 visits, evaluate and treat.  [facility], will you please place that order and send it to her?  Thanks!    She should call her insurance company and check to see if Synvisc One or Euflexxa series of three injections is covered.  If yes, then schedule appt accordingly.     THanks, Jill Fernandes      ----- Message -----     From: Kenia Mckinney RN     Sent: 9/19/2017  10:53 AM       To: Jill Fernandes MD, #  Subject: call back - assistance with follow up reques#    Cell: [phone]  May she have the pool referral at [facility]?  Needs to go to the next level of injections \"or explore them with her [Dr Fernandes]\".  Feels last appointment was quite thorough and does not believe a follow up appointment is needed.  Thanks    "

## 2017-09-26 NOTE — TELEPHONE ENCOUNTER
Called patient to ask where she would like to go to pool therapy. She requested to go to [facility] [address], in [place]. I faxed the pool therapy referral to [facility]. All questions were answered.

## 2017-10-09 ENCOUNTER — MEDICAL CORRESPONDENCE (OUTPATIENT)
Dept: HEALTH INFORMATION MANAGEMENT | Facility: CLINIC | Age: 77
End: 2017-10-09

## 2017-10-13 ENCOUNTER — OFFICE VISIT (OUTPATIENT)
Dept: ORTHOPEDICS | Facility: CLINIC | Age: 77
End: 2017-10-13

## 2017-10-13 VITALS
OXYGEN SATURATION: 96 % | WEIGHT: 230 LBS | SYSTOLIC BLOOD PRESSURE: 133 MMHG | HEIGHT: 64 IN | DIASTOLIC BLOOD PRESSURE: 71 MMHG | HEART RATE: 94 BPM | BODY MASS INDEX: 39.27 KG/M2

## 2017-10-13 DIAGNOSIS — M17.0 BILATERAL PRIMARY OSTEOARTHRITIS OF KNEE: Primary | ICD-10-CM

## 2017-10-13 RX ORDER — ATORVASTATIN CALCIUM 40 MG/1
40 TABLET, FILM COATED ORAL
COMMUNITY
Start: 2017-09-26

## 2017-10-13 RX ORDER — MELOXICAM 7.5 MG/1
7.5 TABLET ORAL 2 TIMES DAILY
Qty: 60 TABLET | Refills: 1 | Status: SHIPPED | OUTPATIENT
Start: 2017-10-13 | End: 2018-10-10

## 2017-10-13 NOTE — PROGRESS NOTES
Attending Note:   I have personally examined this patient and have reviewed the clinical presentation and progress note with the fellow. I agree with the treatment plan as outlined. The plan was formulated with the fellow on the day of the patient's visit.    Jill Fernandes MD, CAQ, CCD  [facility]  Sports Medicine and Bone Health

## 2017-10-13 NOTE — PROGRESS NOTES
"ANNMARIE Lopez is a 76-year-old female with a past medical history significant for known bilateral tricompartmental osteoarthritis who presents to clinic for follow-up regarding the same. Since last being seen, she endorses improvement in her overall symptoms. She is unclear whether or not the corticosteroid injection helped. However, she recently began taking scheduled ibuprofen, 800 mg at night and 400 mg a day during the past week with marked improvement in her symptoms. She is also been utilizing topical Voltaren which she believes has also helped. Her knee swelling on her left knee has resolved. She's not had any further knee injuries. She is currently doing quadriceps strengthening and will be starting pool therapy at the end of the month.    Medical History     Past Surgical History:   Procedure Laterality Date     BACK SURGERY       C STOMACH SURGERY PROCEDURE UNLISTED       HC SACROPLASTY       HYSTERECTOMY       ORTHOPEDIC SURGERY         Past Medical History:   Diagnosis Date     Arthritis      Asthma      Degenerative joint disease      Depression      Hoarseness      Hypertension      Indigestion      Nasal congestion      Night sweats      Numbness      Pneumonia      Ringing in ears      Sleep problems      Snoring      Sore throat      Trouble swallowing      Unspecified cerebral artery occlusion with cerebral infarction      Weakness      Weight gain        Allergies   Allergen Reactions     Lyrica [Pregabalin]      \"felt like I was high and wanted to sleep a lot and did not help with the pain.\"     Niacin      Rapid heartbeat     Sulfa Drugs      Unknown     Tramadol      Dizziness        Social History     Social History     Marital status:      Spouse name: N/A     Number of children: N/A     Years of education: N/A     Occupational History     Not on file.     Social History Main Topics     Smoking status: Former Smoker     Packs/day: 1.00     Years: 18.00     Types: Cigarettes     " "Smokeless tobacco: Never Used     Alcohol use No     Drug use: No     Sexual activity: No     Other Topics Concern     Not on file     Social History Narrative       Family History   Problem Relation Age of Onset     Asthma Father      CEREBROVASCULAR DISEASE Father      Obesity Father      Low Back Problems Other               Current Outpatient Prescriptions   Medication     atorvastatin (LIPITOR) 40 MG tablet     Escitalopram Oxalate (LEXAPRO PO)     COENZYME Q-10 PO     CALCIUM & MAGNESIUM CARBONATES PO     vitamin D (ERGOCALCIFEROL) 82622 UNIT capsule     Tiotropium Bromide Monohydrate (SPIRIVA HANDIHALER IN)     IBUPROFEN PO     Multiple Vitamins-Minerals (PRESERVISION AREDS PO)     naproxen (NAPROSYN) 500 MG tablet     traZODone (DESYREL) 100 MG tablet     ORDER FOR DME     albuterol (2.5 MG/3ML) 0.083% nebulizer solution     losartan (COZAAR) 25 MG tablet     omeprazole (PRILOSEC) 20 MG capsule     albuterol (ACCUNEB) 0.63 MG/3ML nebulizer solution     acetaminophen (TYLENOL) 650 MG CR tablet     PROAIR  (90 BASE) MCG/ACT IN AERS     aspirin 325 MG tablet     simvastatin (ZOCOR) 40 MG tablet     No current facility-administered medications for this visit.            Objective Findings     Vitals:    10/13/17 1046   BP: 133/71   Pulse: 94   SpO2: 96%   Weight: 230 lb (104.3 kg)   Height: 5' 4\" (1.626 m)         Physical Exam:  Gen: A&O in NAD  CV: regular rate, extremities well perfused x4  Pulm: regular rate, without respiratory distress  Derm: no rashes or lesions  Psych: normal affect    MSK: Bilateral knees without obvious effusion, redness or deformity. No bruising. No tenderness of her bilateral joint lines. NTTP over bilateral patellar tendon. No significant tenderness over the quadriceps tendon, pes anserine bursa, fibular head, MCL, or anterior cruciate ligament. No crepitus with patellar grind. FROM with minimal discomfort bilaterally.  Patient has 5/5 strength bilaterally. Firm ligamentous " endpoints bilaterally.      Assessment / Plan     1) Bilateral tricompartmental osteoarthritis without current exacerbation  - Overall, symptoms improved from prior examination  - RTC in December for hyaluronic acid injection prior to upcoming trip to the [place]  - Recommend scheduled Mobic 7.5 mg in abhijeet of Ibuprofen  - If acute flare, discussed stopping mobic and treating with high dose ibuprofen +/- repeat corticosteroid injection  - Continue PT therapy and pool therapy as scheduled     Patient seen and discussed with MD Zbigniew Onofre MD  Primary Care Sports Medicine Fellow

## 2017-10-13 NOTE — MR AVS SNAPSHOT
"              After Visit Summary   10/13/2017    Jessica Ramsey    MRN: [id number]           Patient Information     Date Of Birth          1940        Visit Information        Provider Department      10/13/2017 10:40 AM Jill Fernandes MD [facility]        Today's Diagnoses     Bilateral primary osteoarthritis of knee    -  1       Follow-ups after your visit        Who to contact     Please call your clinic at [phone] to:    Ask questions about your health    Make or cancel appointments    Discuss your medicines    Learn about your test results    Speak to your doctor   If you have compliments or concerns about an experience at your clinic, or if you wish to file a complaint, please contact [facility] Patient Relations at [phone] or email us at [email or web address]         Additional Information About Your Visit        MyChart Information     [employer] gives you secure access to your electronic health record. If you see a primary care provider, you can also send messages to your care team and make appointments. If you have questions, please call your primary care clinic.  If you do not have a primary care provider, please call [phone] and they will assist you.      [employer] is an electronic gateway that provides easy, online access to your medical records. With [employer], you can request a clinic appointment, read your test results, renew a prescription or communicate with your care team.     To access your existing account, please contact your [facility] or call [phone] for assistance.        Care EveryWhere ID     This is your Care EveryWhere ID. This could be used by other organizations to access your [facility] medical records  [id number]        Your Vitals Were     Pulse Height Pulse Oximetry BMI (Body Mass Index)          94 5' 4\" (1.626 m) 96% 39.48 kg/m2         Blood Pressure from Last " 3 Encounters:   10/13/17 133/71   04/13/15 118/78   03/17/15 122/86    Weight from Last 3 Encounters:   10/13/17 230 lb (104.3 kg)   08/25/17 225 lb (102.1 kg)   11/30/16 200 lb (90.7 kg)              Today, you had the following     No orders found for display         Today's Medication Changes          These changes are accurate as of: 10/13/17  1:06 PM.  If you have any questions, ask your nurse or doctor.               Start taking these medicines.        Dose/Directions    meloxicam 7.5 MG tablet   Commonly known as:  MOBIC   Used for:  Bilateral primary osteoarthritis of knee        Dose:  7.5 mg   Take 1 tablet (7.5 mg) by mouth 2 times daily   Quantity:  60 tablet   Refills:  1            Where to get your medicines      These medications were sent to [facility] - [address]  [address] [place] [address]     Phone:  [phone]     meloxicam 7.5 MG tablet                Primary Care Provider Office Phone # Fax #    Suraj Espinoza [phone]       [facility] [address] [address]        Equal Access to Services     DEBORAH LOCKHART AH: Hadii luci jonas hadasho Soomaali, waaxda luqadaha, qaybta kaalmada adeegyada, shante berg. So [facility] [phone].    ATENCIÓN: Si habla español, tiene a oscar disposición servicios gratuitos de asistencia lingüística. Llame al [phone].    We comply with applicable federal civil rights laws and [place] laws. We do not discriminate on the basis of race, color, national origin, age, disability, sex, sexual orientation, or gender identity.            Thank you!     Thank you for choosing [facility]  for your care. Our goal is always to provide you with excellent care. Hearing back from our patients is one way we can continue to improve our services. Please take a few minutes to complete the written survey that you may receive in the mail  after your visit with us. Thank you!             Your Updated Medication List - Protect others around you: Learn how to safely use, store and throw away your medicines at www.disposemymeds.org.          This list is accurate as of: 10/13/17  1:06 PM.  Always use your most recent med list.                   Brand Name Dispense Instructions for use Diagnosis    acetaminophen 650 MG CR tablet    TYLENOL     Take 650 mg by mouth 4 times daily as needed.        aspirin 325 MG tablet      Take 325 mg by mouth daily.        atorvastatin 40 MG tablet    LIPITOR     Take 40 mg by mouth        CALCIUM & MAGNESIUM CARBONATES PO           COENZYME Q-10 PO           IBUPROFEN PO      Take 200 mg by mouth Take three tablets as needed for pain, once or twice a day everyday        LEXAPRO PO      Take 20 mg by mouth daily        losartan 25 MG tablet    COZAAR    90 tablet    Take 1 tablet by mouth daily.    Unspecified essential hypertension       meloxicam 7.5 MG tablet    MOBIC    60 tablet    Take 1 tablet (7.5 mg) by mouth 2 times daily    Bilateral primary osteoarthritis of knee       naproxen 500 MG tablet    NAPROSYN    60 tablet    Take 1 tablet (500 mg) by mouth 2 times daily (with meals)    Right shoulder pain       omeprazole 20 MG CR capsule    priLOSEC    180 capsule    Take 1 capsule by mouth 2 times daily.    Esophageal reflux       order for DME     1 each    Equipment being ordered: Nebulizer    Wheezing       PRESERVISION AREDS PO      Take 1 tablet by mouth daily        * PROAIR  (90 BASE) MCG/ACT Inhaler   Generic drug:  albuterol     2 Inhaler    INHALE 1 TO 2 PUFFS EVERY 4 TO 6 HOURS AS NEEDED    Bronchospasm       * albuterol 0.63 MG/3ML nebulizer solution    ACCUNEB    1 Box    Take 3 mLs by nebulization every 6 hours as needed for shortness of breath / dyspnea. Use 1 unit dose in nebulizer every 4-6 hours as needed    COPD (chronic obstructive pulmonary disease) (H)       * albuterol (2.5 MG/3ML)  0.083% neb solution     30 vial    Take 3 mLs by nebulization every 6 hours as needed for shortness of breath / dyspnea for 30 doses.    Wheezing       simvastatin 40 MG tablet    ZOCOR    90 tablet    Take 1 tablet by mouth At Bedtime.    Other and unspecified hyperlipidemia       SPIRIVA HANDIHALER IN      Once daily inhale into lungs        traZODone 100 MG tablet    DESYREL    30 tablet    Take 1 tablet by mouth nightly as needed for sleep. As directed    Insomnia, unspecified       vitamin D 88350 UNIT capsule    ERGOCALCIFEROL    12 capsule    Take 1 capsule (50,000 Units) by mouth once a week    Vitamin D deficiency disease       * Notice:  This list has 3 medication(s) that are the same as other medications prescribed for you. Read the directions carefully, and ask your doctor or other care provider to review them with you.

## 2017-10-13 NOTE — LETTER
"  10/13/2017      RE: Jessica Ramsey  [address]  [facility] [address]       ANNMARIE Lopez is a 76-year-old female with a past medical history significant for known bilateral tricompartmental osteoarthritis who presents to clinic for follow-up regarding the same. Since last being seen, she endorses improvement in her overall symptoms. She is unclear whether or not the corticosteroid injection helped. However, she recently began taking scheduled ibuprofen, 800 mg at night and 400 mg a day during the past week with marked improvement in her symptoms. She is also been utilizing topical Voltaren which she believes has also helped. Her knee swelling on her left knee has resolved. She's not had any further knee injuries. She is currently doing quadriceps strengthening and will be starting pool therapy at the end of the month.    Medical History     Past Surgical History:   Procedure Laterality Date     BACK SURGERY       C STOMACH SURGERY PROCEDURE UNLISTED       HC SACROPLASTY       HYSTERECTOMY       ORTHOPEDIC SURGERY         Past Medical History:   Diagnosis Date     Arthritis      Asthma      Degenerative joint disease      Depression      Hoarseness      Hypertension      Indigestion      Nasal congestion      Night sweats      Numbness      Pneumonia      Ringing in ears      Sleep problems      Snoring      Sore throat      Trouble swallowing      Unspecified cerebral artery occlusion with cerebral infarction      Weakness      Weight gain        Allergies   Allergen Reactions     Lyrica [Pregabalin]      \"felt like I was high and wanted to sleep a lot and did not help with the pain.\"     Niacin      Rapid heartbeat     Sulfa Drugs      Unknown     Tramadol      Dizziness        Social History     Social History     Marital status:      Spouse name: N/A     Number of children: N/A     Years of education: N/A     Occupational History     Not on file.     Social History Main Topics     Smoking status: " "Former Smoker     Packs/day: 1.00     Years: 18.00     Types: Cigarettes     Smokeless tobacco: Never Used     Alcohol use No     Drug use: No     Sexual activity: No     Other Topics Concern     Not on file     Social History Narrative       Family History   Problem Relation Age of Onset     Asthma Father      CEREBROVASCULAR DISEASE Father      Obesity Father      Low Back Problems Other               Current Outpatient Prescriptions   Medication     atorvastatin (LIPITOR) 40 MG tablet     Escitalopram Oxalate (LEXAPRO PO)     COENZYME Q-10 PO     CALCIUM & MAGNESIUM CARBONATES PO     vitamin D (ERGOCALCIFEROL) 10906 UNIT capsule     Tiotropium Bromide Monohydrate (SPIRIVA HANDIHALER IN)     IBUPROFEN PO     Multiple Vitamins-Minerals (PRESERVISION AREDS PO)     naproxen (NAPROSYN) 500 MG tablet     traZODone (DESYREL) 100 MG tablet     ORDER FOR DME     albuterol (2.5 MG/3ML) 0.083% nebulizer solution     losartan (COZAAR) 25 MG tablet     omeprazole (PRILOSEC) 20 MG capsule     albuterol (ACCUNEB) 0.63 MG/3ML nebulizer solution     acetaminophen (TYLENOL) 650 MG CR tablet     PROAIR  (90 BASE) MCG/ACT IN AERS     aspirin 325 MG tablet     simvastatin (ZOCOR) 40 MG tablet     No current facility-administered medications for this visit.            Objective Findings     Vitals:    10/13/17 1046   BP: 133/71   Pulse: 94   SpO2: 96%   Weight: 230 lb (104.3 kg)   Height: 5' 4\" (1.626 m)         Physical Exam:  Gen: A&O in NAD  CV: regular rate, extremities well perfused x4  Pulm: regular rate, without respiratory distress  Derm: no rashes or lesions  Psych: normal affect    MSK: Bilateral knees without obvious effusion, redness or deformity. No bruising. No tenderness of her bilateral joint lines. NTTP over bilateral patellar tendon. No significant tenderness over the quadriceps tendon, pes anserine bursa, fibular head, MCL, or anterior cruciate ligament. No crepitus with patellar grind. FROM with minimal " discomfort bilaterally.  Patient has 5/5 strength bilaterally. Firm ligamentous endpoints bilaterally.      Assessment / Plan     1) Bilateral tricompartmental osteoarthritis without current exacerbation  - Overall, symptoms improved from prior examination  - RTC in December for hyaluronic acid injection prior to upcoming [place]  - Recommend scheduled low back 7.5 mg in abhijeet of Ibuprofen  - If acute flare, discussed stopping mobic and treating with high dose ibuprofen +/- repeat corticosteroid injection  - Continue PT therapy and pool therapy as scheduled     Patient seen and discussed with MD Zbigniew Onofre MD  Primary Care Sports Medicine Fellow      Attending Note:   I have personally examined this patient and have reviewed the clinical presentation and progress note with the fellow. I agree with the treatment plan as outlined. The plan was formulated with the fellow on the day of the patient's visit.    Jill Fernandes MD, CAQ, CCD  [facility]  Sports Medicine and Bone Health

## 2017-11-14 ENCOUNTER — TELEPHONE (OUTPATIENT)
Dept: ORTHOPEDICS | Facility: CLINIC | Age: 77
End: 2017-11-14

## 2017-11-14 NOTE — TELEPHONE ENCOUNTER
"----- Message from Tatum Godfrey RN sent at 11/8/2017 12:04 PM CST -----  Regarding: Knee pain concerns  Good Afternoon-  Jessica is wondering if you could please give her at call at [phone] as she is having concerns about not being able to \"get control\" of her knee pain. She said her last injection didn't really work and the mobic hasn't worked so she is not so sure what else she should do.    Thank you -   Tatum COLINDRES RN - Ortho triage  "

## 2017-11-14 NOTE — TELEPHONE ENCOUNTER
Returned patient's call regarding her knee pain.  No answer, but message was left to return call to further discuss options to include short course of scheduled Ibuprofen +/- visco supplementation injection.

## 2018-01-12 ENCOUNTER — OFFICE VISIT (OUTPATIENT)
Dept: ORTHOPEDICS | Facility: CLINIC | Age: 78
End: 2018-01-12
Payer: MEDICARE

## 2018-01-12 VITALS
BODY MASS INDEX: 39.27 KG/M2 | HEIGHT: 64 IN | WEIGHT: 230 LBS | SYSTOLIC BLOOD PRESSURE: 131 MMHG | DIASTOLIC BLOOD PRESSURE: 76 MMHG | HEART RATE: 74 BPM

## 2018-01-12 DIAGNOSIS — M17.0 BILATERAL PRIMARY OSTEOARTHRITIS OF KNEE: Primary | ICD-10-CM

## 2018-01-12 NOTE — LETTER
"  1/12/2018      RE: Jessica Ramsey  [address]  [facility] [address]       [facility]      Jessica is a 76-year-old female with a past medical history significant for known bilateral tricompartmental osteoarthritis who presents to clinic for follow-up regarding the same.  Since last being seen, Jessica has had several changes to her medical history including a recent diagnosis of a epileptiform activity.  This is caused her to not take her recent vacation to the [place] and therefore she has delayed her prior appointment with us.  Since last being seen, she continues to endorse intermittent bilateral knee pain.  This discomfort has previously been improved with corticosteroid injections the last of which was performed nearly 6 months ago.  She requests repeat bilateral knee injections in anticipation for an upcoming trip to [place].  She otherwise has not had any new injuries.  She endorses that she will be restarting home therapy for her knees.    Medical History     Past Surgical History:   Procedure Laterality Date     BACK SURGERY       C STOMACH SURGERY PROCEDURE UNLISTED       HC SACROPLASTY       HYSTERECTOMY       ORTHOPEDIC SURGERY         Past Medical History:   Diagnosis Date     Arthritis      Asthma      Degenerative joint disease      Depression      Hoarseness      Hypertension      Indigestion      Nasal congestion      Night sweats      Numbness      Pneumonia      Ringing in ears      Sleep problems      Snoring      Sore throat      Trouble swallowing      Unspecified cerebral artery occlusion with cerebral infarction      Weakness      Weight gain        Allergies   Allergen Reactions     Lyrica [Pregabalin]      \"felt like I was high and wanted to sleep a lot and did not help with the pain.\"     Niacin      Rapid heartbeat     Sulfa Drugs      Unknown     Tramadol      Dizziness        Social History     Social History     Marital status:      Spouse name: N/A     Number of children: N/A " "    Years of education: N/A     Occupational History     Not on file.     Social History Main Topics     Smoking status: Former Smoker     Packs/day: 1.00     Years: 18.00     Types: Cigarettes     Smokeless tobacco: Never Used     Alcohol use No     Drug use: No     Sexual activity: No     Other Topics Concern     Not on file     Social History Narrative       Family History   Problem Relation Age of Onset     Asthma Father      CEREBROVASCULAR DISEASE Father      Obesity Father      Low Back Problems Other               Current Outpatient Prescriptions   Medication     atorvastatin (LIPITOR) 40 MG tablet     meloxicam (MOBIC) 7.5 MG tablet     Escitalopram Oxalate (LEXAPRO PO)     COENZYME Q-10 PO     CALCIUM & MAGNESIUM CARBONATES PO     vitamin D (ERGOCALCIFEROL) 05394 UNIT capsule     Tiotropium Bromide Monohydrate (SPIRIVA HANDIHALER IN)     IBUPROFEN PO     Multiple Vitamins-Minerals (PRESERVISION AREDS PO)     naproxen (NAPROSYN) 500 MG tablet     traZODone (DESYREL) 100 MG tablet     ORDER FOR DME     albuterol (2.5 MG/3ML) 0.083% nebulizer solution     losartan (COZAAR) 25 MG tablet     omeprazole (PRILOSEC) 20 MG capsule     albuterol (ACCUNEB) 0.63 MG/3ML nebulizer solution     acetaminophen (TYLENOL) 650 MG CR tablet     PROAIR  (90 BASE) MCG/ACT IN AERS     aspirin 325 MG tablet     simvastatin (ZOCOR) 40 MG tablet     No current facility-administered medications for this visit.            Objective Findings     Vitals:    01/12/18 0932   BP: 131/76   BP Location: Right arm   Patient Position: Sitting   Cuff Size: Adult Large   Pulse: 74   Weight: 230 lb (104.3 kg)   Height: 5' 4\" (1.626 m)         Physical Exam:  Gen: A&O in NAD  CV: regular rate, extremities well perfused x4  Pulm: regular rate, without respiratory distress  Derm: no rashes or lesions  Psych: normal affect    MSK: Bilateral knees without obvious effusion, redness or deformity. No bruising. Mild TTP over bilateral medial joint " lines. Equal ROM with minimal discomfort bilaterally. NTTP over bilateral patellar tendon. No significant tenderness over the quadriceps tendon, pes anserine bursa, fibular head, MCL, or anterior cruciate ligament. No crepitus with patellar grind.  Patient has 5/5 strength bilaterally.     Procedure Note  Procedure/Location: Bilateral Knee CSI  Approach: Anterolateral, x2 into left and right knees  Prior to procedure, risks and benefits explained to patient and written consent obtained.  Injectate: 1.5 mL (60mg) Kenalog, 4 mL Lidocaine 1% w/o epinephrine using a 25 g needle into each knee  Prior to injection, skin sites were sterilized.  Injection was then administered in sterile fashion without complication in each knee.  Pt experienced moderate relief of symptoms immediately following procedure.  Bandage was placed over sites.  Post procedure instructions discussed with patient.      Assessment / Plan     1) Bilateral tricompartmental osteoarthritis with mild exacerbation  - Bilateral CSI injections performed without complication as above  - Recommend continue home PT  - If continued discomfort, could consider future viscosupplementation   - Patient endorsed understanding and agreement with the above plan     Patient seen and discussed with MD Zbigniew Onofre MD  Primary Care Sports Medicine Fellow      Attending Note:   I have personally examined this patient and have reviewed the clinical presentation and progress note with the fellow. I agree with the treatment plan as outlined. The plan was formulated with the fellow on the day of the patient's visit. I have reviewed all imaging with the fellow and agree with the findings in the documentation. I have supervised the injection procedures performed by Dr. Nick.    Jill Fernandes MD, CAQ, CCD  [facility]  Sports Medicine and Bone Health

## 2018-01-12 NOTE — NURSING NOTE
[address] [address]  Dept: [phone]  ______________________________________________________________________________    Patient: Jessica Ramsey   : 1940   MRN: [id number]   2018    INVASIVE PROCEDURE SAFETY CHECKLIST    Date: 2018   Procedure: Bilateral knee kenalog injections  Patient Name: Jessica Ramsey  MRN: [id number]  YOB: 1940    Action: Complete sections as appropriate. Any discrepancy results in a HARD COPY until resolved.     PRE PROCEDURE:  Patient ID verified with 2 identifiers (name and  or MRN): Yes  Procedure and site verified with patient/designee (when able): Yes  Accurate consent documentation in medical record: Yes  H&P (or appropriate assessment) documented in medical record: Yes  H&P must be up to 20 days prior to procedure and updates within 24 hours of procedure as applicable: NA  Relevant diagnostic and radiology test results appropriately labeled and displayed as applicable: Yes  Procedure site(s) marked with provider initials: NA    TIMEOUT:  Time-Out performed immediately prior to starting procedure, including verbal and active participation of all team members addressing the following:Yes  * Correct patient identify  * Confirmed that the correct side and site are marked  * An accurate procedure consent form  * Agreement on the procedure to be done  * Correct patient position  * Relevant images and results are properly labeled and appropriately displayed  * The need to administer antibiotics or fluids for irrigation purposes during the procedure as applicable   * Safety precautions based on patient history or medication use    DURING PROCEDURE: Verification of correct person, site, and procedures any time the responsibility for care of the patient is transferred to another member of the care team.     The following medication was given:     MEDICATION:  Kenalog 60 mg  ROUTE:  Intraarticular  SITE: Both knees  DOSE: 60 mg x2  LOT #: [id number]; [id number]  : [employer]  EXPIRATION DATE: MAY2019  NDC#: 3660-7443-03   Was there drug waste? No     MEDICATION: 1% Lidocaine without epinephrine  ROUTE: Intraarticular  SITE: Both Knees  DOSE: 4 mL x2  LOT #: [id number]  : [employer]  EXPIRATION DATE: 09/21  NDC#: 22677-819-71  Was there drug waste? Yes  Amount of drug waste (mL): 12.  Reason for waste:  Single use vial      Magnolia Fofana, ATC  January 12, 2018

## 2018-01-12 NOTE — MR AVS SNAPSHOT
"              After Visit Summary   1/12/2018    Jessica Ramsey    MRN: [id number]           Patient Information     Date Of Birth          1940        Visit Information        Provider Department      1/12/2018 9:20 AM Jill Fernandes MD [facility] Sports Medicine        Today's Diagnoses     Bilateral primary osteoarthritis of knee    -  1       Follow-ups after your visit        Who to contact     Please call your clinic at [phone] to:    Ask questions about your health    Make or cancel appointments    Discuss your medicines    Learn about your test results    Speak to your doctor   If you have compliments or concerns about an experience at your clinic, or if you wish to file a complaint, please contact [facility] Patient Relations at [phone] or email us at [email or web address]         Additional Information About Your Visit        MyChart Information     [employer] gives you secure access to your electronic health record. If you see a primary care provider, you can also send messages to your care team and make appointments. If you have questions, please call your primary care clinic.  If you do not have a primary care provider, please call [phone] and they will assist you.      [facility] is an electronic gateway that provides easy, online access to your medical records. With [facility], you can request a clinic appointment, read your test results, renew a prescription or communicate with your care team.     To access your existing account, please contact your [facility] or call [phone] for assistance.        Care EveryWhere ID     This is your Care EveryWhere ID. This could be used by other organizations to access your [place] medical records  [id number]        Your Vitals Were     Pulse Height BMI (Body Mass Index)             74 5' 4\" (1.626 m) 39.48 kg/m2          Blood Pressure from Last 3 Encounters: "   01/12/18 131/76   10/13/17 133/71   04/13/15 118/78    Weight from Last 3 Encounters:   01/12/18 230 lb (104.3 kg)   10/13/17 230 lb (104.3 kg)   08/25/17 225 lb (102.1 kg)              We Performed the Following     Large Joint/Bursa injection and/or drainage - Bilateral (Shoulder, Knee) [43421] [50 Mod]     TRIAMCINOLONE ACET INJ NOS          Today's Medication Changes          These changes are accurate as of: 1/12/18 11:59 PM.  If you have any questions, ask your nurse or doctor.               Start taking these medicines.        Dose/Directions    triamcinolone acetonide 40 MG/ML injection   Commonly known as:  KENALOG   Used for:  Bilateral primary osteoarthritis of knee   Started by:  Jill Fernandes MD        Dose:  60 mg   1.5 mLs (60 mg) by INTRA-ARTICULAR route once for 1 dose   Quantity:  3 mL   Refills:  0            Where to get your medicines      Some of these will need a paper prescription and others can be bought over the counter.  Ask your nurse if you have questions.     You don't need a prescription for these medications     triamcinolone acetonide 40 MG/ML injection                Primary Care Provider Office Phone # Fax #    Suraj Espinoza [phone]       [facility] MEDICINE [address] [address]        Equal Access to Services     DEBORAH LOCKHART AH: Hadii luci jonas hadalexo Sojayme, waaxda luqadaha, qaybta kaalmada ramon, shante berg. So [facility] [phone].    ATENCIÓN: Si habla español, tiene a oscar disposición servicios gratuitos de asistencia lingüística. Radha al [phone].    We comply with applicable federal civil rights laws and [place] laws. We do not discriminate on the basis of race, color, national origin, age, disability, sex, sexual orientation, or gender identity.            Thank you!     Thank you for choosing [facility]  for your care. Our goal is always to provide  you with excellent care. Hearing back from our patients is one way we can continue to improve our services. Please take a few minutes to complete the written survey that you may receive in the mail after your visit with us. Thank you!             Your Updated Medication List - Protect others around you: Learn how to safely use, store and throw away your medicines at www.disposemymeds.org.          This list is accurate as of: 1/12/18 11:59 PM.  Always use your most recent med list.                   Brand Name Dispense Instructions for use Diagnosis    acetaminophen 650 MG CR tablet    TYLENOL     Take 650 mg by mouth 4 times daily as needed.        aspirin 325 MG tablet      Take 325 mg by mouth daily.        atorvastatin 40 MG tablet    LIPITOR     Take 40 mg by mouth        CALCIUM & MAGNESIUM CARBONATES PO           COENZYME Q-10 PO           IBUPROFEN PO      Take 200 mg by mouth Take three tablets as needed for pain, once or twice a day everyday        LEXAPRO PO      Take 20 mg by mouth daily        losartan 25 MG tablet    COZAAR    90 tablet    Take 1 tablet by mouth daily.    Unspecified essential hypertension       meloxicam 7.5 MG tablet    MOBIC    60 tablet    Take 1 tablet (7.5 mg) by mouth 2 times daily    Bilateral primary osteoarthritis of knee       naproxen 500 MG tablet    NAPROSYN    60 tablet    Take 1 tablet (500 mg) by mouth 2 times daily (with meals)    Right shoulder pain       omeprazole 20 MG CR capsule    priLOSEC    180 capsule    Take 1 capsule by mouth 2 times daily.    Esophageal reflux       order for DME     1 each    Equipment being ordered: Nebulizer    Wheezing       PRESERVISION AREDS PO      Take 1 tablet by mouth daily        * PROAIR  (90 BASE) MCG/ACT Inhaler   Generic drug:  albuterol     2 Inhaler    INHALE 1 TO 2 PUFFS EVERY 4 TO 6 HOURS AS NEEDED    Bronchospasm       * albuterol 0.63 MG/3ML nebulizer solution    ACCUNEB    1 Box    Take 3 mLs by nebulization  every 6 hours as needed for shortness of breath / dyspnea. Use 1 unit dose in nebulizer every 4-6 hours as needed    COPD (chronic obstructive pulmonary disease) (H)       * albuterol (2.5 MG/3ML) 0.083% neb solution     30 vial    Take 3 mLs by nebulization every 6 hours as needed for shortness of breath / dyspnea for 30 doses.    Wheezing       simvastatin 40 MG tablet    ZOCOR    90 tablet    Take 1 tablet by mouth At Bedtime.    Other and unspecified hyperlipidemia       SPIRIVA HANDIHALER IN      Once daily inhale into lungs        traZODone 100 MG tablet    DESYREL    30 tablet    Take 1 tablet by mouth nightly as needed for sleep. As directed    Insomnia, unspecified       triamcinolone acetonide 40 MG/ML injection    KENALOG    3 mL    1.5 mLs (60 mg) by INTRA-ARTICULAR route once for 1 dose    Bilateral primary osteoarthritis of knee       vitamin D 20271 UNIT capsule    ERGOCALCIFEROL    12 capsule    Take 1 capsule (50,000 Units) by mouth once a week    Vitamin D deficiency disease       * Notice:  This list has 3 medication(s) that are the same as other medications prescribed for you. Read the directions carefully, and ask your doctor or other care provider to review them with you.

## 2018-01-12 NOTE — PROGRESS NOTES
"ANNMARIE Lopez is a 76-year-old female with a past medical history significant for known bilateral tricompartmental osteoarthritis who presents to clinic for follow-up regarding the same.  Since last being seen, Jessica has had several changes to her medical history including a recent diagnosis of a epileptiform activity.  This is caused her to not take her recent vacation to the [place] and therefore she has delayed her prior appointment with us.  Since last being seen, she continues to endorse intermittent bilateral knee pain.  This discomfort has previously been improved with corticosteroid injections the last of which was performed nearly 6 months ago.  She requests repeat bilateral knee injections in anticipation for an upcoming trip to [place].  She otherwise has not had any new injuries.  She endorses that she will be restarting home therapy for her knees.    Medical History     Past Surgical History:   Procedure Laterality Date     BACK SURGERY       C STOMACH SURGERY PROCEDURE UNLISTED       HC SACROPLASTY       HYSTERECTOMY       ORTHOPEDIC SURGERY         Past Medical History:   Diagnosis Date     Arthritis      Asthma      Degenerative joint disease      Depression      Hoarseness      Hypertension      Indigestion      Nasal congestion      Night sweats      Numbness      Pneumonia      Ringing in ears      Sleep problems      Snoring      Sore throat      Trouble swallowing      Unspecified cerebral artery occlusion with cerebral infarction      Weakness      Weight gain        Allergies   Allergen Reactions     Lyrica [Pregabalin]      \"felt like I was high and wanted to sleep a lot and did not help with the pain.\"     Niacin      Rapid heartbeat     Sulfa Drugs      Unknown     Tramadol      Dizziness        Social History     Social History     Marital status:      Spouse name: N/A     Number of children: N/A     Years of education: N/A     Occupational History     Not on file.     Social " "History Main Topics     Smoking status: Former Smoker     Packs/day: 1.00     Years: 18.00     Types: Cigarettes     Smokeless tobacco: Never Used     Alcohol use No     Drug use: No     Sexual activity: No     Other Topics Concern     Not on file     Social History Narrative       Family History   Problem Relation Age of Onset     Asthma Father      CEREBROVASCULAR DISEASE Father      Obesity Father      Low Back Problems Other               Current Outpatient Prescriptions   Medication     atorvastatin (LIPITOR) 40 MG tablet     meloxicam (MOBIC) 7.5 MG tablet     Escitalopram Oxalate (LEXAPRO PO)     COENZYME Q-10 PO     CALCIUM & MAGNESIUM CARBONATES PO     vitamin D (ERGOCALCIFEROL) 64402 UNIT capsule     Tiotropium Bromide Monohydrate (SPIRIVA HANDIHALER IN)     IBUPROFEN PO     Multiple Vitamins-Minerals (PRESERVISION AREDS PO)     naproxen (NAPROSYN) 500 MG tablet     traZODone (DESYREL) 100 MG tablet     ORDER FOR DME     albuterol (2.5 MG/3ML) 0.083% nebulizer solution     losartan (COZAAR) 25 MG tablet     omeprazole (PRILOSEC) 20 MG capsule     albuterol (ACCUNEB) 0.63 MG/3ML nebulizer solution     acetaminophen (TYLENOL) 650 MG CR tablet     PROAIR  (90 BASE) MCG/ACT IN AERS     aspirin 325 MG tablet     simvastatin (ZOCOR) 40 MG tablet     No current facility-administered medications for this visit.            Objective Findings     Vitals:    01/12/18 0932   BP: 131/76   BP Location: Right arm   Patient Position: Sitting   Cuff Size: Adult Large   Pulse: 74   Weight: 230 lb (104.3 kg)   Height: 5' 4\" (1.626 m)         Physical Exam:  Gen: A&O in NAD  CV: regular rate, extremities well perfused x4  Pulm: regular rate, without respiratory distress  Derm: no rashes or lesions  Psych: normal affect    MSK: Bilateral knees without obvious effusion, redness or deformity. No bruising. Mild TTP over bilateral medial joint lines. Equal ROM with minimal discomfort bilaterally. NTTP over bilateral " patellar tendon. No significant tenderness over the quadriceps tendon, pes anserine bursa, fibular head, MCL, or anterior cruciate ligament. No crepitus with patellar grind.  Patient has 5/5 strength bilaterally.     Procedure Note  Procedure/Location: Bilateral Knee CSI  Approach: Anterolateral, x2 into left and right knees  Prior to procedure, risks and benefits explained to patient and written consent obtained.  Injectate: 1.5 mL (60mg) Kenalog, 4 mL Lidocaine 1% w/o epinephrine using a 25 g needle into each knee  Prior to injection, skin sites were sterilized.  Injection was then administered in sterile fashion without complication in each knee.  Pt experienced moderate relief of symptoms immediately following procedure.  Bandage was placed over sites.  Post procedure instructions discussed with patient.      Assessment / Plan     1) Bilateral tricompartmental osteoarthritis with mild exacerbation  - Bilateral CSI injections performed without complication as above  - Recommend continue home PT  - If continued discomfort, could consider future viscosupplementation   - Patient endorsed understanding and agreement with the above plan     Patient seen and discussed with MD Zbigniew Onofre MD  Primary Care Sports Medicine Fellow

## 2018-01-15 RX ORDER — TRIAMCINOLONE ACETONIDE 40 MG/ML
60 INJECTION, SUSPENSION INTRA-ARTICULAR; INTRAMUSCULAR ONCE
Qty: 3 ML | Refills: 0 | OUTPATIENT
Start: 2018-01-15 | End: 2018-01-15

## 2018-02-03 ENCOUNTER — TELEPHONE (OUTPATIENT)
Dept: ORTHOPEDICS | Facility: CLINIC | Age: 78
End: 2018-02-03

## 2018-02-03 DIAGNOSIS — M17.0 BILATERAL PRIMARY OSTEOARTHRITIS OF KNEE: Primary | ICD-10-CM

## 2018-02-03 NOTE — TELEPHONE ENCOUNTER
----- Message from Magnolia Fofana ATC sent at 1/25/2018 11:46 AM CST -----  Regarding: FW: Pt would like to discuss other inj available besides allison  Contact: [phone]      ----- Message -----     From: Meenakshi Mendosa     Sent: 1/25/2018  11:14 AM       To: Sports Med Triage-[facility]  Subject: Pt would like to discuss other inj available#    Pt calling to f/u on her discussion with Dr Fernandes and Dr. Nick on other options available besides allison inj during last appt on 01/12/18.  Pt would like to discuss what those options were and what is recommended so that she can set up an appt or if an order needs to be put in for this injection.  Pt ask that a call back be made on her land line listed above, if a call is made after 1pm to call her cell phone [phone]    Thank you,  Meenakshi MORENO  Call Center    Please DO NOT send this message and/or reply back to sender.  Call Center Representatives DO NOT respond to messages.

## 2018-02-03 NOTE — TELEPHONE ENCOUNTER
Returned call to patient to discuss options regarding patient's knee pain.  She plans to schedule an appointment in the upcoming 2-3 weeks and will call her insurance regarding coverage of hyaluronic acid injection preparations and which one is covered by her particular insurance.  In the interim, she will continue to utilize NSAID's for her knee discomfort.

## 2018-02-22 ENCOUNTER — TELEPHONE (OUTPATIENT)
Dept: ORTHOPEDICS | Facility: CLINIC | Age: 78
End: 2018-02-22

## 2018-02-22 DIAGNOSIS — M17.0 BILATERAL PRIMARY OSTEOARTHRITIS OF KNEE: Primary | ICD-10-CM

## 2018-02-22 NOTE — TELEPHONE ENCOUNTER
I informed the patient that Dr. Nick put in new pool therapy orders. The patient wanted pool therapy orders sent to Noah Gamboa at [facility]. This is through [facility].

## 2018-03-21 ENCOUNTER — TRANSFERRED RECORDS (OUTPATIENT)
Dept: HEALTH INFORMATION MANAGEMENT | Facility: CLINIC | Age: 78
End: 2018-03-21

## 2018-04-20 ENCOUNTER — OFFICE VISIT (OUTPATIENT)
Dept: ORTHOPEDICS | Facility: CLINIC | Age: 78
End: 2018-04-20
Payer: MEDICARE

## 2018-04-20 VITALS — SYSTOLIC BLOOD PRESSURE: 138 MMHG | DIASTOLIC BLOOD PRESSURE: 75 MMHG | HEART RATE: 90 BPM | RESPIRATION RATE: 16 BRPM

## 2018-04-20 DIAGNOSIS — M17.0 BILATERAL PRIMARY OSTEOARTHRITIS OF KNEE: Primary | ICD-10-CM

## 2018-04-20 RX ORDER — CHOLECALCIFEROL (VITAMIN D3) 50 MCG
1 TABLET ORAL
COMMUNITY
Start: 2017-11-14

## 2018-04-20 RX ORDER — TRIAMCINOLONE ACETONIDE 40 MG/ML
40 INJECTION, SUSPENSION INTRA-ARTICULAR; INTRAMUSCULAR ONCE
Qty: 2 ML | Refills: 0 | OUTPATIENT
Start: 2018-04-20 | End: 2018-04-20

## 2018-04-20 NOTE — NURSING NOTE
[address] [address]  Dept: [phone]  ______________________________________________________________________________    Patient: Jessica Ramsey   : 1940   MRN: [id number]   2018    INVASIVE PROCEDURE SAFETY CHECKLIST    Date: 18   Procedure:Bilateral kenalog knee injections  Patient Name: Jessica Ramsey  MRN: [id number]  YOB: 1940    Action: Complete sections as appropriate. Any discrepancy results in a HARD COPY until resolved.     PRE PROCEDURE:  Patient ID verified with 2 identifiers (name and  or MRN): Yes  Procedure and site verified with patient/designee (when able): Yes  Accurate consent documentation in medical record: Yes  H&P (or appropriate assessment) documented in medical record: Yes  H&P must be up to 20 days prior to procedure and updates within 24 hours of procedure as applicable: NA  Relevant diagnostic and radiology test results appropriately labeled and displayed as applicable: Yes  Procedure site(s) marked with provider initials: NA    TIMEOUT:  Time-Out performed immediately prior to starting procedure, including verbal and active participation of all team members addressing the following:Yes  * Correct patient identify  * Confirmed that the correct side and site are marked  * An accurate procedure consent form  * Agreement on the procedure to be done  * Correct patient position  * Relevant images and results are properly labeled and appropriately displayed  * The need to administer antibiotics or fluids for irrigation purposes during the procedure as applicable   * Safety precautions based on patient history or medication use    DURING PROCEDURE: Verification of correct person, site, and procedures any time the responsibility for care of the patient is transferred to another member of the care team.     The following medication was given:     MEDICATION:  Kenalog 40 mg; 4ml of 1%  lidocaine (in each syringe)  ROUTE: Intra-Articular  SITE: Bilateral knees  DOSE: Kenalog 40 mg; 4ml of 1% lidocaine (in each syringe)  LOT #: Kenalog: [id number]; Lidocaine: [id number]  : Kenalog: [place]; Lidocaine: [employer]  EXPIRATION DATE: [place]/19; Lidocaine:   NDC#: [place]-9; Lidocaine: 02392-854-62   Was there drug waste? Yes  Amount of drug waste (mL): 2.  Reason for waste:  As per MD Eneida Hudson, ATC  2018

## 2018-04-20 NOTE — PROGRESS NOTES
ANNMARIE Lopez is a 76-year-old female with a past medical history significant for known bilateral tricompartmental osteoarthritis who presents to clinic for follow-up regarding the same.  She would like B knee CSI today.  Knees R>>L are aching again and it is getting harder to get around.  She has a trip to [place] next week.  Reports losing some weight recently.  Also currently seeing Psychiatry for depression/anxiety.       Current Outpatient Prescriptions   Medication     acetaminophen (TYLENOL) 650 MG CR tablet     albuterol (2.5 MG/3ML) 0.083% nebulizer solution     albuterol (ACCUNEB) 0.63 MG/3ML nebulizer solution     aspirin 325 MG tablet     atorvastatin (LIPITOR) 40 MG tablet     CALCIUM & MAGNESIUM CARBONATES PO     Cholecalciferol (VITAMIN D) 2000 units tablet     COENZYME Q-10 PO     Escitalopram Oxalate (LEXAPRO PO)     IBUPROFEN PO     losartan (COZAAR) 25 MG tablet     meloxicam (MOBIC) 7.5 MG tablet     Multiple Vitamins-Minerals (PRESERVISION AREDS PO)     naproxen (NAPROSYN) 500 MG tablet     omeprazole (PRILOSEC) 20 MG capsule     ORDER FOR DME     PROAIR  (90 BASE) MCG/ACT IN AERS     Sertraline HCl (ZOLOFT PO)     Tiotropium Bromide Monohydrate (SPIRIVA HANDIHALER IN)     traZODone (DESYREL) 100 MG tablet     vitamin D (ERGOCALCIFEROL) 34723 UNIT capsule     simvastatin (ZOCOR) 40 MG tablet     No current facility-administered medications for this visit.              Objective Findings     Vitals:    04/20/18 0746   BP: 138/75   Pulse: 90   Resp: 16         Physical Exam:  Gen: A&O in NAD  Psych: normal affect    MSK: Bilateral knees without obvious effusion, redness or deformity. No bruising. Mild TTP over bilateral medial joint lines. Equal ROM with minimal discomfort bilaterally. NTTP over bilateral patellar tendon. No significant tenderness over the quadriceps tendon, pes anserine bursa, fibular head, MCL, or anterior cruciate ligament. No crepitus with patellar grind.   Patient has 5/5 strength bilaterally.     Procedure Note  Procedure/Location: Bilateral Knee CSI  Approach: Anterolateral, x2 into left and right knees  Prior to procedure, risks and benefits explained to patient and written consent obtained.  Injectate: 1.0 mL (40mg) Kenalog, 4 mL Lidocaine 1% w/o epinephrine using a 25 g needle into each knee  Prior to injection, skin sites were cleaned with Chloraprep.  Injection was then administered in sterile fashion without complication in each knee.  Pt experienced moderate relief of symptoms immediately following procedure.  Bandage was placed over sites.  Post procedure instructions discussed with patient.      Assessment / Plan     1) Bilateral tricompartmental osteoarthritis with mild exacerbation  - Bilateral CSI injections performed without complication as above  -R knee medial  brace  -Consider obtaining updated imaging at her next visit  -Discussed referral to Joint Replacement Surgeon and how to decide when she wants to do that.  She declines that at this time.    - Recommend continue home PT  - If continued discomfort, could consider future viscosupplementation   - Patient endorsed understanding and agreement with the above plan     Jill Fernandes MD, CAQ, FACSM, CCD  [facility]  Sports Medicine and Bone Health  Team Physician;  Athletics

## 2018-04-20 NOTE — LETTER
4/20/2018      RE: Jessica Ramsey  [address]  [facility] [address]       [facility]      Jessica is a 76-year-old female with a past medical history significant for known bilateral tricompartmental osteoarthritis who presents to clinic for follow-up regarding the same.  She would like B knee CSI today.  Knees R>>L are aching again and it is getting harder to get around.  She has a trip to [place] next week.  Reports losing some weight recently.  Also currently seeing Psychiatry for depression/anxiety.       Current Outpatient Prescriptions   Medication     acetaminophen (TYLENOL) 650 MG CR tablet     albuterol (2.5 MG/3ML) 0.083% nebulizer solution     albuterol (ACCUNEB) 0.63 MG/3ML nebulizer solution     aspirin 325 MG tablet     atorvastatin (LIPITOR) 40 MG tablet     CALCIUM & MAGNESIUM CARBONATES PO     Cholecalciferol (VITAMIN D) 2000 units tablet     COENZYME Q-10 PO     Escitalopram Oxalate (LEXAPRO PO)     IBUPROFEN PO     losartan (COZAAR) 25 MG tablet     meloxicam (MOBIC) 7.5 MG tablet     Multiple Vitamins-Minerals (PRESERVISION AREDS PO)     naproxen (NAPROSYN) 500 MG tablet     omeprazole (PRILOSEC) 20 MG capsule     ORDER FOR DME     PROAIR  (90 BASE) MCG/ACT IN AERS     Sertraline HCl (ZOLOFT PO)     Tiotropium Bromide Monohydrate (SPIRIVA HANDIHALER IN)     traZODone (DESYREL) 100 MG tablet     vitamin D (ERGOCALCIFEROL) 18462 UNIT capsule     simvastatin (ZOCOR) 40 MG tablet     No current facility-administered medications for this visit.              Objective Findings     Vitals:    04/20/18 0746   BP: 138/75   Pulse: 90   Resp: 16         Physical Exam:  Gen: A&O in NAD  Psych: normal affect    MSK: Bilateral knees without obvious effusion, redness or deformity. No bruising. Mild TTP over bilateral medial joint lines. Equal ROM with minimal discomfort bilaterally. NTTP over bilateral patellar tendon. No significant tenderness over the quadriceps tendon, pes anserine bursa,  fibular head, MCL, or anterior cruciate ligament. No crepitus with patellar grind.  Patient has 5/5 strength bilaterally.     Procedure Note  Procedure/Location: Bilateral Knee CSI  Approach: Anterolateral, x2 into left and right knees  Prior to procedure, risks and benefits explained to patient and written consent obtained.  Injectate: 1.0 mL (40mg) Kenalog, 4 mL Lidocaine 1% w/o epinephrine using a 25 g needle into each knee  Prior to injection, skin sites were cleaned with Chloraprep.  Injection was then administered in sterile fashion without complication in each knee.  Pt experienced moderate relief of symptoms immediately following procedure.  Bandage was placed over sites.  Post procedure instructions discussed with patient.      Assessment / Plan     1) Bilateral tricompartmental osteoarthritis with mild exacerbation  - Bilateral CSI injections performed without complication as above  -R knee medial  brace  -Consider obtaining updated imaging at her next visit  -Discussed referral to Joint Replacement Surgeon and how to decide when she wants to do that.  She declines that at this time.    - Recommend continue home PT  - If continued discomfort, could consider future viscosupplementation   - Patient endorsed understanding and agreement with the above plan     Jill Fernandes MD, CAQ, FACSM, CCD  [facility]  Sports Medicine and Bone Health  Team Physician;  Athletics      Jill Fernandes MD

## 2018-04-20 NOTE — MR AVS SNAPSHOT
After Visit Summary   4/20/2018    Jessica Ramsey    MRN: [id number]           Patient Information     Date Of Birth          1940        Visit Information        Provider Department      4/20/2018 8:00 AM Jill Fernandes MD [facility] Sports Medicine        Today's Diagnoses     Bilateral primary osteoarthritis of knee    -  1       Follow-ups after your visit        Additional Services     DME REFERRAL       Please be aware that coverage of these services is subject to the terms and limitations of your health insurance plan.  Call member services at your health plan with any benefit or coverage questions.     Right knee medial  brace    Please bring the following to your appointment:  Any x-rays, CTs or MRIs which have been performed.  Contact the facility where they were done to arrange for  prior to your scheduled appointment.    List of current medications   This referral request   Any documents/labs given to you for this referral                  Who to contact     Please call your clinic at [phone] to:    Ask questions about your health    Make or cancel appointments    Discuss your medicines    Learn about your test results    Speak to your doctor            Additional Information About Your Visit        [employer] Information     [employer] gives you secure access to your electronic health record. If you see a primary care provider, you can also send messages to your care team and make appointments. If you have questions, please call your primary care clinic.  If you do not have a primary care provider, please call [phone] and they will assist you.      [employer] is an electronic gateway that provides easy, online access to your medical records. With [employer], you can request a clinic appointment, read your test results, renew a prescription or communicate with your care team.     To access your existing account, please contact your [facility] or call [phone] for assistance.        Care EveryWhere ID     This is your Care EveryWhere ID. This could be used by other organizations to access your [facility] medical records  [id number]        Your Vitals Were     Pulse Respirations                90 16           Blood Pressure from Last 3 Encounters:   04/20/18 138/75   01/12/18 131/76   10/13/17 133/71    Weight from Last 3 Encounters:   01/12/18 230 lb (104.3 kg)   10/13/17 230 lb (104.3 kg)   08/25/17 225 lb (102.1 kg)              We Performed the Following     DME REFERRAL     Large Joint/Bursa injection and/or drainage - Bilateral (Shoulder, Knee) [26554] [50 Mod]     TRIAMCINOLONE ACET INJ NOS          Today's Medication Changes          These changes are accurate as of 4/20/18  9:07 AM.  If you have any questions, ask your nurse or doctor.               Start taking these medicines.        Dose/Directions    triamcinolone acetonide 40 MG/ML injection   Commonly known as:  KENALOG   Used for:  Bilateral primary osteoarthritis of knee        Dose:  40 mg   1 mL (40 mg) by INTRA-ARTICULAR route once for 1 dose   Quantity:  2 mL   Refills:  0            Where to get your medicines      Some of these will need a paper prescription and others can be bought over the counter.  Ask your nurse if you have questions.     You don't need a prescription for these medications     triamcinolone acetonide 40 MG/ML injection                Primary Care Provider Office Phone # Fax #    Suraj Espinoza [phone]       [facility] MEDICINE [address] [place] [address]        Equal Access to Services     DEBORAH LOCKHART AH: Hadlucy smallwood Sojayme, waaxda luqadaha, qaybta kaalmada shante navarro. So [facility] [phone].    ATENCIÓN: Si habla español, tiene a oscar disposición servicios gratuitos de asistencia lingüística. Llame al [phone].    We comply with applicable  federal civil rights laws and [place] laws. We do not discriminate on the basis of race, color, national origin, age, disability, sex, sexual orientation, or gender identity.            Thank you!     Thank you for choosing [facility]  for your care. Our goal is always to provide you with excellent care. Hearing back from our patients is one way we can continue to improve our services. Please take a few minutes to complete the written survey that you may receive in the mail after your visit with us. Thank you!             Your Updated Medication List - Protect others around you: Learn how to safely use, store and throw away your medicines at www.disposemymeds.org.          This list is accurate as of 4/20/18  9:07 AM.  Always use your most recent med list.                   Brand Name Dispense Instructions for use Diagnosis    acetaminophen 650 MG CR tablet    TYLENOL     Take 650 mg by mouth 4 times daily as needed.        aspirin 325 MG tablet      Take 325 mg by mouth daily.        atorvastatin 40 MG tablet    LIPITOR     Take 40 mg by mouth        CALCIUM & MAGNESIUM CARBONATES PO           COENZYME Q-10 PO           IBUPROFEN PO      Take 200 mg by mouth Take three tablets as needed for pain, once or twice a day everyday        LEXAPRO PO      Take 20 mg by mouth daily        losartan 25 MG tablet    COZAAR    90 tablet    Take 1 tablet by mouth daily.    Unspecified essential hypertension       meloxicam 7.5 MG tablet    MOBIC    60 tablet    Take 1 tablet (7.5 mg) by mouth 2 times daily    Bilateral primary osteoarthritis of knee       naproxen 500 MG tablet    NAPROSYN    60 tablet    Take 1 tablet (500 mg) by mouth 2 times daily (with meals)    Right shoulder pain       omeprazole 20 MG CR capsule    priLOSEC    180 capsule    Take 1 capsule by mouth 2 times daily.    Esophageal reflux       order for DME     1 each    Equipment being ordered: Nebulizer    Wheezing       PRESERVISION AREDS  PO      Take 1 tablet by mouth daily        * PROAIR  (90 Base) MCG/ACT Inhaler   Generic drug:  albuterol     2 Inhaler    INHALE 1 TO 2 PUFFS EVERY 4 TO 6 HOURS AS NEEDED    Bronchospasm       * albuterol 0.63 MG/3ML nebulizer solution    ACCUNEB    1 Box    Take 3 mLs by nebulization every 6 hours as needed for shortness of breath / dyspnea. Use 1 unit dose in nebulizer every 4-6 hours as needed    COPD (chronic obstructive pulmonary disease) (H)       * albuterol (2.5 MG/3ML) 0.083% neb solution     30 vial    Take 3 mLs by nebulization every 6 hours as needed for shortness of breath / dyspnea for 30 doses.    Wheezing       simvastatin 40 MG tablet    ZOCOR    90 tablet    Take 1 tablet by mouth At Bedtime.    Other and unspecified hyperlipidemia       SPIRIVA HANDIHALER IN      Once daily inhale into lungs        traZODone 100 MG tablet    DESYREL    30 tablet    Take 1 tablet by mouth nightly as needed for sleep. As directed    Insomnia, unspecified       triamcinolone acetonide 40 MG/ML injection    KENALOG    2 mL    1 mL (40 mg) by INTRA-ARTICULAR route once for 1 dose    Bilateral primary osteoarthritis of knee       vitamin D 2000 units tablet      Take 1 tablet by mouth        vitamin D 42836 UNIT capsule    ERGOCALCIFEROL    12 capsule    Take 1 capsule (50,000 Units) by mouth once a week    Vitamin D deficiency disease       ZOLOFT PO      Take 75 mg by mouth daily        * Notice:  This list has 3 medication(s) that are the same as other medications prescribed for you. Read the directions carefully, and ask your doctor or other care provider to review them with you.

## 2018-08-18 ENCOUNTER — HEALTH MAINTENANCE LETTER (OUTPATIENT)
Age: 78
End: 2018-08-18

## 2018-10-10 ENCOUNTER — OFFICE VISIT (OUTPATIENT)
Dept: ORTHOPEDICS | Facility: CLINIC | Age: 78
End: 2018-10-10
Payer: MEDICARE

## 2018-10-10 ENCOUNTER — RADIANT APPOINTMENT (OUTPATIENT)
Dept: GENERAL RADIOLOGY | Facility: CLINIC | Age: 78
End: 2018-10-10
Attending: FAMILY MEDICINE
Payer: MEDICARE

## 2018-10-10 VITALS — HEART RATE: 89 BPM | RESPIRATION RATE: 16 BRPM | SYSTOLIC BLOOD PRESSURE: 135 MMHG | DIASTOLIC BLOOD PRESSURE: 84 MMHG

## 2018-10-10 DIAGNOSIS — M25.561 PAIN IN BOTH KNEES, UNSPECIFIED CHRONICITY: ICD-10-CM

## 2018-10-10 DIAGNOSIS — M25.562 PAIN IN BOTH KNEES, UNSPECIFIED CHRONICITY: ICD-10-CM

## 2018-10-10 DIAGNOSIS — M17.0 BILATERAL PRIMARY OSTEOARTHRITIS OF KNEE: Primary | ICD-10-CM

## 2018-10-10 RX ADMIN — LIDOCAINE HYDROCHLORIDE 4 ML: 10 INJECTION, SOLUTION INFILTRATION; PERINEURAL at 15:06

## 2018-10-10 RX ADMIN — TRIAMCINOLONE ACETONIDE 40 MG: 40 INJECTION, SUSPENSION INTRA-ARTICULAR; INTRAMUSCULAR at 15:06

## 2018-10-10 NOTE — LETTER
"  10/10/2018      RE: Jessica Ramsey  [address]  [facility] [address]       [facility]      Jessica is a 76-year-old female with a past medical history significant for known bilateral tricompartmental osteoarthritis who presents to clinic for follow-up regarding the same.  She would like B knee CSI today.  Knees R>>L are aching again and it is getting harder to get around.  She has a trip to [place] next week.  Shots used to last longer up to 9 months. Now she is getting between 3 and 6. Cane helpful. Did have pool therapy ordered but didn't go \"I don't know if I will go\"     2. mood  Reports losing some weight recently related to her depression she thinks- changed her diet as well to intentionally lose weight.  Also currently seeing Psychiatry for depression/anxiety. Things have been worse with the depression.   three years ago.     Current Outpatient Prescriptions   Medication     acetaminophen (TYLENOL) 650 MG CR tablet     albuterol (2.5 MG/3ML) 0.083% nebulizer solution     albuterol (ACCUNEB) 0.63 MG/3ML nebulizer solution     aspirin 325 MG tablet     atorvastatin (LIPITOR) 40 MG tablet     CALCIUM & MAGNESIUM CARBONATES PO     Cholecalciferol (VITAMIN D) 2000 units tablet     COENZYME Q-10 PO     Escitalopram Oxalate (LEXAPRO PO)     GABAPENTIN PO     IBUPROFEN PO     losartan (COZAAR) 25 MG tablet     Multiple Vitamins-Minerals (PRESERVISION AREDS PO)     naproxen (NAPROSYN) 500 MG tablet     omeprazole (PRILOSEC) 20 MG capsule     ORDER FOR DME     PROAIR  (90 BASE) MCG/ACT IN AERS     Sertraline HCl (ZOLOFT PO)     simvastatin (ZOCOR) 40 MG tablet     Tiotropium Bromide Monohydrate (SPIRIVA HANDIHALER IN)     traZODone (DESYREL) 100 MG tablet     vitamin D (ERGOCALCIFEROL) 38709 UNIT capsule     No current facility-administered medications for this visit.      Objective Findings     Vitals:    10/10/18 1339   BP: 135/84   BP Location: Right arm   Patient Position: Sitting   Cuff " "Size: Adult Large   Pulse: 89   Resp: 16     Physical Exam:  Gen: A&O in NAD  Psych: somewhat flattened affect    MSK: no effusion, no bruising. ttp over medial >lateral joint bilaterally, mild ttp at inferior patellar pole, neg patellar grind test. rom from 0-135 bilaterally, strength 5/5    xrays today: severe tricompartmental arthritis, with worse spurring and joint space loss in patellorfemoral compartment     Procedure Note  Procedure/Location: Bilateral Knee CSI  Approach: Anterolateral, x2 into left and right knees  Prior to procedure, risks and benefits explained to patient and written consent obtained.  Injectate: 1 mL (40mg) Kenalog, 4 mL Lidocaine 1% w/o epinephrine using a 22 g 1.5\" needle into each knee  Prior to injection, skin sites were cleaned with Chloraprep.  Injection was then administered in sterile fashion without complication in each knee.  Pt experienced moderate relief of symptoms immediately following procedure.  Bandage was placed over sites.  Post procedure instructions discussed with patient.    Assessment / Plan     1) Bilateral tricompartmental osteoarthritis  - xrays reviewed by me  - last injection 6 months ago  - bilateral shot repeated today patient satisfied with length of response from shots at this time. Could consider long acting triamcinolone if time continues to decrease  - discussed PT for home as prior and suggested that she may get benefit   - consider trial of diclofenac gel (patient elected to wait at this time. )    Patient seen and discussed with Dr. Jill Wade.     Yrn Alexander MD  Sports Medicine Fellow  10/10/2018 2:41 PM      Large Joint Injection/Arthocentesis  Date/Time: 10/10/2018 3:06 PM  Performed by: JILL WADE  Authorized by: JILL WADE     Indications:  Osteoarthritis  Needle Size:  22 G  Guidance: landmark guided    Approach:  Anterolateral  Location:  Knee  Laterality:  Bilateral  Site:  Bilateral " knee  Medications (Right):  4 mL lidocaine 1 %; 40 mg triamcinolone acetonide 40 MG/ML  Medications (Left):  4 mL lidocaine 1 %; 40 mg triamcinolone acetonide 40 MG/ML  Outcome:  Tolerated well, no immediate complications  Procedure discussed: discussed risks, benefits, and alternatives    Consent Given by:  Patient  Timeout: timeout called immediately prior to procedure    Prep: patient was prepped and draped in usual sterile fashion     Waste: 12 mL lidocaine, single vial use.    Scribed by Magnolia Fofana MS, ATC for Dr. Alexander/Dr. Fenrandes on 10/10/18 at 3:08 PM, based on the provider s statements to me.    Attending Note:   I have personally examined this patient and have reviewed the clinical presentation and progress note with the fellow. I agree with the treatment plan as outlined. The plan was formulated with the fellow on the day of the patient's visit. I have reviewed all imaging with the fellow and agree with the findings in the documentation. I have supervised the injections.   Jill Fernandes MD, CAQ, CCD  [facility]  Sports Medicine and Bone Health

## 2018-10-10 NOTE — PROGRESS NOTES
"ANNMARIE Lopez is a 76-year-old female with a past medical history significant for known bilateral tricompartmental osteoarthritis who presents to clinic for follow-up regarding the same.  She would like B knee CSI today.  Knees R>>L are aching again and it is getting harder to get around.  She has a trip to [place] next week.  Shots used to last longer up to 9 months. Now she is getting between 3 and 6. Cane helpful. Did have pool therapy ordered but didn't go \"I don't know if I will go\"     2. mood  Reports losing some weight recently related to her depression she thinks- changed her diet as well to intentionally lose weight.  Also currently seeing Psychiatry for depression/anxiety. Things have been worse with the depression.   three years ago.         Current Outpatient Prescriptions   Medication     acetaminophen (TYLENOL) 650 MG CR tablet     albuterol (2.5 MG/3ML) 0.083% nebulizer solution     albuterol (ACCUNEB) 0.63 MG/3ML nebulizer solution     aspirin 325 MG tablet     atorvastatin (LIPITOR) 40 MG tablet     CALCIUM & MAGNESIUM CARBONATES PO     Cholecalciferol (VITAMIN D) 2000 units tablet     COENZYME Q-10 PO     Escitalopram Oxalate (LEXAPRO PO)     GABAPENTIN PO     IBUPROFEN PO     losartan (COZAAR) 25 MG tablet     Multiple Vitamins-Minerals (PRESERVISION AREDS PO)     naproxen (NAPROSYN) 500 MG tablet     omeprazole (PRILOSEC) 20 MG capsule     ORDER FOR DME     PROAIR  (90 BASE) MCG/ACT IN AERS     Sertraline HCl (ZOLOFT PO)     simvastatin (ZOCOR) 40 MG tablet     Tiotropium Bromide Monohydrate (SPIRIVA HANDIHALER IN)     traZODone (DESYREL) 100 MG tablet     vitamin D (ERGOCALCIFEROL) 19585 UNIT capsule     No current facility-administered medications for this visit.              Objective Findings     Vitals:    10/10/18 1339   BP: 135/84   BP Location: Right arm   Patient Position: Sitting   Cuff Size: Adult Large   Pulse: 89   Resp: 16         Physical Exam:  Gen: A&O " "in NAD  Psych: somewhat flattened affect    MSK: no effusion, no bruising. ttp over medial >lateral joint bilaterally, mild ttp at inferior patellar pole, neg patellar grind test. rom from 0-135 bilaterally, strength 5/5    xrays today: severe tricompartmental arthritis, with worse spurring and joint space loss in patellorfemoral compartment       Procedure Note  Procedure/Location: Bilateral Knee CSI  Approach: Anterolateral, x2 into left and right knees  Prior to procedure, risks and benefits explained to patient and written consent obtained.  Injectate: 1 mL (40mg) Kenalog, 4 mL Lidocaine 1% w/o epinephrine using a 22 g 1.5\" needle into each knee  Prior to injection, skin sites were cleaned with Chloraprep.  Injection was then administered in sterile fashion without complication in each knee.  Pt experienced moderate relief of symptoms immediately following procedure.  Bandage was placed over sites.  Post procedure instructions discussed with patient.            Assessment / Plan     1) Bilateral tricompartmental osteoarthritis  - xrays reviewed by me  - last injection 6 months ago  - bilateral shot repeated today patient satisfied with length of response from shots at this time. Could consider long acting triamcinolone if time continues to decrease  - discussed PT for home as prior and suggested that she may get benefit   - consider trial of diclofenac gel (patient elected to wait at this time. )      Patient seen and discussed with Dr. Jill Wade.     Yrn Alexander MD  Sports Medicine Fellow  10/10/2018 2:41 PM      Large Joint Injection/Arthocentesis  Date/Time: 10/10/2018 3:06 PM  Performed by: JILL WADE  Authorized by: JILL WADE     Indications:  Osteoarthritis  Needle Size:  22 G  Guidance: landmark guided    Approach:  Anterolateral  Location:  Knee  Laterality:  Bilateral  Site:  Bilateral knee  Medications (Right):  4 mL lidocaine 1 %; 40 mg triamcinolone " acetonide 40 MG/ML  Medications (Left):  4 mL lidocaine 1 %; 40 mg triamcinolone acetonide 40 MG/ML  Outcome:  Tolerated well, no immediate complications  Procedure discussed: discussed risks, benefits, and alternatives    Consent Given by:  Patient  Timeout: timeout called immediately prior to procedure    Prep: patient was prepped and draped in usual sterile fashion     Waste: 12 mL lidocaine, single vial use.    Scribed by Magnolia Fofana MS, ATC for Dr. Alexander/Dr. Fernandes on 10/10/18 at 3:08 PM, based on the provider s statements to me.

## 2018-10-10 NOTE — MR AVS SNAPSHOT
After Visit Summary   10/10/2018    Jessica Ramsey    MRN: [id number]           Patient Information     Date Of Birth          1940        Visit Information        Provider Department      10/10/2018 1:40 PM Jill Fernandes MD [facility] Sports Medicine        Today's Diagnoses     Bilateral primary osteoarthritis of knee    -  1    Pain in both knees, unspecified chronicity          Care Instructions    1. New injections today    2. Try the new gel but don't take naproxen when you are using it    3. Think about doing the pool therapy or formal PT to see if it will alter your pain at all.           Follow-ups after your visit        Who to contact     Please call your clinic at [phone] to:    Ask questions about your health    Make or cancel appointments    Discuss your medicines    Learn about your test results    Speak to your doctor            Additional Information About Your Visit        MyChart Information     [employer] gives you secure access to your electronic health record. If you see a primary care provider, you can also send messages to your care team and make appointments. If you have questions, please call your primary care clinic.  If you do not have a primary care provider, please call [phone] and they will assist you.      [employer] is an electronic gateway that provides easy, online access to your medical records. With [employer], you can request a clinic appointment, read your test results, renew a prescription or communicate with your care team.     To access your existing account, please contact your [facility] or call [phone] for assistance.        Care EveryWhere ID     This is your Care EveryWhere ID. This could be used by other organizations to access your [place] medical records  [id number]        Your Vitals Were     Pulse Respirations                89 16           Blood Pressure from Last 3 Encounters:   10/10/18  135/84   04/20/18 138/75   01/12/18 131/76    Weight from Last 3 Encounters:   01/12/18 230 lb (104.3 kg)   10/13/17 230 lb (104.3 kg)   08/25/17 225 lb (102.1 kg)              We Performed the Following     DRAIN/INJECT LARGE JOINT/BURSA          Today's Medication Changes          These changes are accurate as of 10/10/18 11:59 PM.  If you have any questions, ask your nurse or doctor.               Stop taking these medicines if you haven't already. Please contact your care team if you have questions.     meloxicam 7.5 MG tablet   Commonly known as:  MOBIC   Stopped by:  Jill Fernandes MD                    Primary Care Provider Office Phone # Fax #    Suraj Espinoza [phone]       [facility] [address] [address]        Equal Access to Services     [facility] : Hadii aad ku hadasho Soomaali, waaxda luqadaha, qaybta kaalmada adeegyada, waxay idiin haysarah bethn andrzej littlejohn . So [facility] [phone].    ATENCIÓN: Si habla español, tiene a oscar disposición servicios gratuitos de asistencia lingüística. Radha al [phone].    We comply with applicable federal civil rights laws and [place] laws. We do not discriminate on the basis of race, color, national origin, age, disability, sex, sexual orientation, or gender identity.            Thank you!     Thank you for choosing [facility]  for your care. Our goal is always to provide you with excellent care. Hearing back from our patients is one way we can continue to improve our services. Please take a few minutes to complete the written survey that you may receive in the mail after your visit with us. Thank you!             Your Updated Medication List - Protect others around you: Learn how to safely use, store and throw away your medicines at www.disposemymeds.org.          This list is accurate as of 10/10/18 11:59 PM.  Always use your most recent med list.                    Brand Name Dispense Instructions for use Diagnosis    acetaminophen 650 MG CR tablet    TYLENOL     Take 650 mg by mouth 4 times daily as needed.        aspirin 325 MG tablet      Take 325 mg by mouth daily.        atorvastatin 40 MG tablet    LIPITOR     Take 40 mg by mouth        CALCIUM & MAGNESIUM CARBONATES PO           COENZYME Q-10 PO           GABAPENTIN PO           IBUPROFEN PO      Take 200 mg by mouth Take three tablets as needed for pain, once or twice a day everyday        LEXAPRO PO      Take 20 mg by mouth daily        losartan 25 MG tablet    COZAAR    90 tablet    Take 1 tablet by mouth daily.    Unspecified essential hypertension       naproxen 500 MG tablet    NAPROSYN    60 tablet    Take 1 tablet (500 mg) by mouth 2 times daily (with meals)    Right shoulder pain       omeprazole 20 MG CR capsule    priLOSEC    180 capsule    Take 1 capsule by mouth 2 times daily.    Esophageal reflux       order for DME     1 each    Equipment being ordered: Nebulizer    Wheezing       PRESERVISION AREDS PO      Take 1 tablet by mouth daily        * PROAIR  (90 Base) MCG/ACT inhaler   Generic drug:  albuterol     2 Inhaler    INHALE 1 TO 2 PUFFS EVERY 4 TO 6 HOURS AS NEEDED    Bronchospasm       * albuterol 0.63 MG/3ML nebulizer solution    ACCUNEB    1 Box    Take 3 mLs by nebulization every 6 hours as needed for shortness of breath / dyspnea. Use 1 unit dose in nebulizer every 4-6 hours as needed    COPD (chronic obstructive pulmonary disease) (H)       * albuterol (2.5 MG/3ML) 0.083% neb solution     30 vial    Take 3 mLs by nebulization every 6 hours as needed for shortness of breath / dyspnea for 30 doses.    Wheezing       simvastatin 40 MG tablet    ZOCOR    90 tablet    Take 1 tablet by mouth At Bedtime.    Other and unspecified hyperlipidemia       SPIRIVA HANDIHALER IN      Once daily inhale into lungs        traZODone 100 MG tablet    DESYREL    30 tablet    Take 1 tablet by mouth nightly as  needed for sleep. As directed    Insomnia, unspecified       vitamin D 2000 units tablet      Take 1 tablet by mouth        vitamin D 47721 UNIT capsule    ERGOCALCIFEROL    12 capsule    Take 1 capsule (50,000 Units) by mouth once a week    Vitamin D deficiency disease       ZOLOFT PO      Take 75 mg by mouth daily        * Notice:  This list has 3 medication(s) that are the same as other medications prescribed for you. Read the directions carefully, and ask your doctor or other care provider to review them with you.

## 2018-10-10 NOTE — PATIENT INSTRUCTIONS
1. New injections today    2. Try the new gel but don't take naproxen when you are using it    3. Think about doing the pool therapy or formal PT to see if it will alter your pain at all.

## 2018-10-25 RX ORDER — TRIAMCINOLONE ACETONIDE 40 MG/ML
40 INJECTION, SUSPENSION INTRA-ARTICULAR; INTRAMUSCULAR
Status: SHIPPED | OUTPATIENT
Start: 2018-10-10

## 2018-10-25 RX ORDER — LIDOCAINE HYDROCHLORIDE 10 MG/ML
4 INJECTION, SOLUTION INFILTRATION; PERINEURAL
Status: SHIPPED | OUTPATIENT
Start: 2018-10-10

## 2018-10-25 NOTE — PROGRESS NOTES
Attending Note:   I have personally examined this patient and have reviewed the clinical presentation and progress note with the fellow. I agree with the treatment plan as outlined. The plan was formulated with the fellow on the day of the patient's visit. I have reviewed all imaging with the fellow and agree with the findings in the documentation. I have supervised the injections.   Jill Fernandes MD, CAQ, CCD  [facility]  Sports Medicine and Bone Health

## 2019-01-15 ENCOUNTER — PATIENT OUTREACH (OUTPATIENT)
Dept: CARE COORDINATION | Facility: CLINIC | Age: 79
End: 2019-01-15

## 2019-02-06 ENCOUNTER — OFFICE VISIT (OUTPATIENT)
Dept: ORTHOPEDICS | Facility: CLINIC | Age: 79
End: 2019-02-06
Payer: MEDICARE

## 2019-02-06 VITALS — SYSTOLIC BLOOD PRESSURE: 134 MMHG | HEART RATE: 76 BPM | DIASTOLIC BLOOD PRESSURE: 74 MMHG | RESPIRATION RATE: 16 BRPM

## 2019-02-06 DIAGNOSIS — M17.0 PRIMARY OSTEOARTHRITIS OF BOTH KNEES: Primary | ICD-10-CM

## 2019-02-06 DIAGNOSIS — G89.29 CHRONIC LOW BACK PAIN, UNSPECIFIED BACK PAIN LATERALITY, WITH SCIATICA PRESENCE UNSPECIFIED: ICD-10-CM

## 2019-02-06 DIAGNOSIS — M54.5 CHRONIC LOW BACK PAIN, UNSPECIFIED BACK PAIN LATERALITY, WITH SCIATICA PRESENCE UNSPECIFIED: ICD-10-CM

## 2019-02-06 RX ORDER — METOPROLOL SUCCINATE 25 MG/1
TABLET, EXTENDED RELEASE ORAL
Refills: 0 | COMMUNITY
Start: 2019-01-11

## 2019-02-06 RX ORDER — PHENAZOPYRIDINE HYDROCHLORIDE 200 MG/1
TABLET, FILM COATED ORAL
Refills: 0 | COMMUNITY
Start: 2018-04-07

## 2019-02-06 RX ADMIN — LIDOCAINE HYDROCHLORIDE 4 ML: 10 INJECTION, SOLUTION EPIDURAL; INFILTRATION; INTRACAUDAL; PERINEURAL at 11:01

## 2019-02-06 RX ADMIN — TRIAMCINOLONE ACETONIDE 40 MG: 40 INJECTION, SUSPENSION INTRA-ARTICULAR; INTRAMUSCULAR at 11:01

## 2019-02-06 NOTE — PROGRESS NOTES
Attending Note:   I have personally examined this patient and have reviewed the clinical presentation and progress note with the fellow. I agree with the treatment plan as outlined. The plan was formulated with the fellow on the day of the patient's visit. I have reviewed all imaging with the fellow and agree with the findings in the documentation. I have supervised the knee injections performed by Dr. Alexander.     Jill Fernandes MD, CAQ, CCD  [facility]  Sports Medicine and Bone Health

## 2019-02-06 NOTE — PROGRESS NOTES
ANNMARIE Lopez is a 76-year-old female with a past medical history significant for known bilateral tricompartmental osteoarthritis who presents to clinic for follow-up regarding the same.  She would like B knee CSI today.  Knees L is worse than right and aching again and it is getting harder to get around.  Last injection was 4 months ago (10/10) [place]      In wheelchair today- doesn't use. Uses cane which helps. Walker helps too. Not walking much because she isn't outside.     2. Other aches  Dr,. Nate Win at [facility] fusion in back and left shoulder pain and neck stiffness.  Would like to check in on the sink when she is able      Current Outpatient Medications   Medication     acetaminophen (TYLENOL) 650 MG CR tablet     albuterol (2.5 MG/3ML) 0.083% nebulizer solution     albuterol (ACCUNEB) 0.63 MG/3ML nebulizer solution     aspirin 325 MG tablet     atorvastatin (LIPITOR) 40 MG tablet     CALCIUM & MAGNESIUM CARBONATES PO     Cholecalciferol (VITAMIN D) 2000 units tablet     COENZYME Q-10 PO     Escitalopram Oxalate (LEXAPRO PO)     GABAPENTIN PO     IBUPROFEN PO     losartan (COZAAR) 25 MG tablet     metoprolol succinate ER (TOPROL-XL) 25 MG 24 hr tablet     Multiple Vitamins-Minerals (PRESERVISION AREDS PO)     naproxen (NAPROSYN) 500 MG tablet     omeprazole (PRILOSEC) 20 MG capsule     ORDER FOR DME     phenazopyridine (PYRIDIUM) 200 MG tablet     PROAIR  (90 BASE) MCG/ACT IN AERS     Sertraline HCl (ZOLOFT PO)     simvastatin (ZOCOR) 40 MG tablet     Tiotropium Bromide Monohydrate (SPIRIVA HANDIHALER IN)     traZODone (DESYREL) 100 MG tablet     vitamin D (ERGOCALCIFEROL) 38243 UNIT capsule     Current Facility-Administered Medications   Medication     lidocaine 1 % injection 4 mL     lidocaine 1 % injection 4 mL     triamcinolone acetonide (KENALOG-40) injection 40 mg     triamcinolone acetonide (KENALOG-40) injection 40 mg  "            Objective Findings     Vitals:    02/06/19 1044   BP: 134/74   Pulse: 76   Resp: 16         Physical Exam:  Gen: A&O in NAD  Psych: Mildly flattened affect    MSK: no effusion, no bruising. ttp over medial >lateral joint bilaterally, mild ttp at inferior patellar pole,  rom from 0-135 on left, range of motion 5-130 on right, strength 5-/5    Reviewed old x-rays      Procedure Note  Procedure/Location: Bilateral Knee CSI  Approach: Anterolateral, x2 into left and right knees  Prior to procedure, risks and benefits explained to patient and written consent obtained.  Injectate: 1 mL (40mg) Kenalog, 4 mL Lidocaine 1% w/o epinephrine using a 22 g 1.5\" needle into each knee  Prior to injection, skin sites were cleaned with Chloraprep.  Injection was then administered in sterile fashion without complication in each knee.  Pt experienced moderate relief of symptoms immediately following procedure.  Bandage was placed over sites.  Post procedure instructions discussed with patient.            Assessment / Plan     1) Bilateral tricompartmental osteoarthritis  -Last injections were 4 months ago.  Not completely worn off but starting to ache at night which is suggestive of things getting worse.  -In regards to her back and leg pain I think will help in both.  She is agreeable and excited to try prolotherapy at this time the referral and number were given today  -f/u for shoulder pain in 2-4 weeks      Patient seen and discussed with Dr. Jill Fernandes.     Yrn Alexander MD  Sports Medicine Fellow  2/6/2019 2:39 PM        Large Joint Injection/Arthocentesis: bilateral knee  Date/Time: 2/6/2019 11:01 AM  Performed by: Jill Fernandes MD  Authorized by: Jill Fernandes MD     Indications:  Osteoarthritis  Needle Size:  22 G  Guidance: landmark guided    Approach:  Anterolateral  Location:  Knee  Laterality:  Bilateral  Site:  Bilateral knee  Medications (Right):  4 mL lidocaine (PF) 1 %; " 40 mg triamcinolone 40 MG/ML  Medications (Left):  4 mL lidocaine (PF) 1 %; 40 mg triamcinolone 40 MG/ML  Procedure discussed: discussed risks, benefits, and alternatives    Consent Given by:  Patient  Timeout: timeout called immediately prior to procedure    Prep: patient was prepped and draped in usual sterile fashion     22ml of 1% lidocaine was wasted per MD

## 2019-02-06 NOTE — LETTER
2/6/2019      RE: Jessica Ramsey  [address]  [facility] [address]       HPI      Jessica is a 76-year-old female with a past medical history significant for known bilateral tricompartmental osteoarthritis who presents to clinic for follow-up regarding the same.  She would like B knee CSI today.  Knees L is worse than right and aching again and it is getting harder to get around.  Last injection was 4 months ago (10/10) [place]      In wheelchair today- doesn't use. Uses cane which helps. Walker helps too. Not walking much because she isn't outside.     2. Other aches  Dr,. Nate Win at [facility] in [facility] fusion in back and left shoulder pain and neck stiffness.  Would like to check in on the sink when she is able      Current Outpatient Medications   Medication     acetaminophen (TYLENOL) 650 MG CR tablet     albuterol (2.5 MG/3ML) 0.083% nebulizer solution     albuterol (ACCUNEB) 0.63 MG/3ML nebulizer solution     aspirin 325 MG tablet     atorvastatin (LIPITOR) 40 MG tablet     CALCIUM & MAGNESIUM CARBONATES PO     Cholecalciferol (VITAMIN D) 2000 units tablet     COENZYME Q-10 PO     Escitalopram Oxalate (LEXAPRO PO)     GABAPENTIN PO     IBUPROFEN PO     losartan (COZAAR) 25 MG tablet     metoprolol succinate ER (TOPROL-XL) 25 MG 24 hr tablet     Multiple Vitamins-Minerals (PRESERVISION AREDS PO)     naproxen (NAPROSYN) 500 MG tablet     omeprazole (PRILOSEC) 20 MG capsule     ORDER FOR DME     phenazopyridine (PYRIDIUM) 200 MG tablet     PROAIR  (90 BASE) MCG/ACT IN AERS     Sertraline HCl (ZOLOFT PO)     simvastatin (ZOCOR) 40 MG tablet     Tiotropium Bromide Monohydrate (SPIRIVA HANDIHALER IN)     traZODone (DESYREL) 100 MG tablet     vitamin D (ERGOCALCIFEROL) 69464 UNIT capsule     Current Facility-Administered Medications   Medication     lidocaine 1 % injection 4 mL     lidocaine 1 % injection 4 mL     triamcinolone acetonide  "(KENALOG-40) injection 40 mg     triamcinolone acetonide (KENALOG-40) injection 40 mg       Objective Findings     Vitals:    02/06/19 1044   BP: 134/74   Pulse: 76   Resp: 16       Physical Exam:  Gen: A&O in NAD  Psych: Mildly flattened affect    MSK: no effusion, no bruising. ttp over medial >lateral joint bilaterally, mild ttp at inferior patellar pole,  rom from 0-135 on left, range of motion 5-130 on right, strength 5-/5    Reviewed old x-rays    Procedure Note  Procedure/Location: Bilateral Knee CSI  Approach: Anterolateral, x2 into left and right knees  Prior to procedure, risks and benefits explained to patient and written consent obtained.  Injectate: 1 mL (40mg) Kenalog, 4 mL Lidocaine 1% w/o epinephrine using a 22 g 1.5\" needle into each knee  Prior to injection, skin sites were cleaned with Chloraprep.  Injection was then administered in sterile fashion without complication in each knee.  Pt experienced moderate relief of symptoms immediately following procedure.  Bandage was placed over sites.  Post procedure instructions discussed with patient.    Assessment / Plan     1) Bilateral tricompartmental osteoarthritis  -Last injections were 4 months ago.  Not completely worn off but starting to ache at night which is suggestive of things getting worse.  -In regards to her back and leg pain I think will help in both.  She is agreeable and excited to try prolotherapy at this time the referral and number were given today  -f/u for shoulder pain in 2-4 weeks    Patient seen and discussed with Dr. Jill Fernandes.     Yrn Alexander MD  Sports Medicine Fellow  2/6/2019 2:39 PM      Large Joint Injection/Arthocentesis: bilateral knee  Date/Time: 2/6/2019 11:01 AM  Performed by: Jill Fernandes MD  Authorized by: Jill Fernandes MD     Indications:  Osteoarthritis  Needle Size:  22 G  Guidance: landmark guided    Approach:  Anterolateral  Location:  Knee  Laterality:  Bilateral  Site:  " Bilateral knee  Medications (Right):  4 mL lidocaine (PF) 1 %; 40 mg triamcinolone 40 MG/ML  Medications (Left):  4 mL lidocaine (PF) 1 %; 40 mg triamcinolone 40 MG/ML  Procedure discussed: discussed risks, benefits, and alternatives    Consent Given by:  Patient  Timeout: timeout called immediately prior to procedure    Prep: patient was prepped and draped in usual sterile fashion     22ml of 1% lidocaine was wasted per MD     Attending Note:   I have personally examined this patient and have reviewed the clinical presentation and progress note with the fellow. I agree with the treatment plan as outlined. The plan was formulated with the fellow on the day of the patient's visit. I have reviewed all imaging with the fellow and agree with the findings in the documentation. I have supervised the knee injections performed by Dr. Alexander.     Jill Fernandes MD, CAQ, CCD  [facility]  Sports Medicine and Bone Health

## 2019-02-07 RX ORDER — TRIAMCINOLONE ACETONIDE 40 MG/ML
40 INJECTION, SUSPENSION INTRA-ARTICULAR; INTRAMUSCULAR
Status: SHIPPED | OUTPATIENT
Start: 2019-02-06

## 2019-02-07 RX ORDER — LIDOCAINE HYDROCHLORIDE 10 MG/ML
4 INJECTION, SOLUTION EPIDURAL; INFILTRATION; INTRACAUDAL; PERINEURAL
Status: SHIPPED | OUTPATIENT
Start: 2019-02-06

## 2019-02-25 NOTE — TELEPHONE ENCOUNTER
RECORDS RECEIVED FROM: neck and left shoulder pain    DATE RECEIVED: 2.28.19   NOTES STATUS DETAILS   OFFICE NOTE from referring provider Internal 2.6.19   OFFICE NOTE from other specialist N/A    DISCHARGE SUMMARY from hospital N/A    DISCHARGE REPORT from the ER N/A    OPERATIVE REPORT N/A    MEDICATION LIST Internal    IMPLANT RECORD/STICKER N/A    LABS     CBC/DIFF Care Everywhere 2.21.18   CULTURES N/A    INJECTIONS DONE IN RADIOLOGY N/A    MRI N/A    CT SCAN Internal 4.2.12   XRAYS (IMAGES & REPORTS) Internal 3.9.15   TUMOR     PATHOLOGY  Slides & report N/A

## 2019-02-26 ENCOUNTER — TELEPHONE (OUTPATIENT)
Dept: ORTHOPEDICS | Facility: CLINIC | Age: 79
End: 2019-02-26

## 2019-02-26 NOTE — TELEPHONE ENCOUNTER
[facility] Call Center    Phone Message    May a detailed message be left on voicemail: yes    Reason for Call: Other: Pt needs order for Pool therapy faxed to [facility] at [phone], ASAP, so she can schedule     Action Taken: Message routed to:  [facility] ([facility] Sports

## 2019-02-28 ENCOUNTER — PRE VISIT (OUTPATIENT)
Dept: ORTHOPEDICS | Facility: CLINIC | Age: 79
End: 2019-02-28

## 2019-02-28 DIAGNOSIS — M54.2 NECK PAIN: Primary | ICD-10-CM

## 2019-02-28 DIAGNOSIS — M25.512 LEFT SHOULDER PAIN, UNSPECIFIED CHRONICITY: ICD-10-CM

## 2019-09-03 ENCOUNTER — OFFICE VISIT (OUTPATIENT)
Dept: ORTHOPEDICS | Facility: CLINIC | Age: 79
End: 2019-09-03
Payer: MEDICARE

## 2019-09-03 VITALS — BODY MASS INDEX: 38.62 KG/M2 | WEIGHT: 225 LBS

## 2019-09-03 DIAGNOSIS — M17.0 BILATERAL PRIMARY OSTEOARTHRITIS OF KNEE: Primary | ICD-10-CM

## 2019-09-03 RX ORDER — TRIAMCINOLONE ACETONIDE 40 MG/ML
40 INJECTION, SUSPENSION INTRA-ARTICULAR; INTRAMUSCULAR
Status: SHIPPED | OUTPATIENT
Start: 2019-09-03

## 2019-09-03 RX ORDER — FUROSEMIDE 20 MG
25 TABLET ORAL DAILY
COMMUNITY

## 2019-09-03 RX ADMIN — TRIAMCINOLONE ACETONIDE 40 MG: 40 INJECTION, SUSPENSION INTRA-ARTICULAR; INTRAMUSCULAR at 15:38

## 2019-09-03 NOTE — LETTER
9/3/2019       RE: Jessica Ramsey  [address]  [place] [facility] [address]     Dear Colleague,    Thank you for referring your patient, Jessica Ramsey, to the [facility] SPORTS AND ORTHOPAEDIC WALK IN CLINIC at [facility]. Please see a copy of my visit note below.          SPORTS & ORTHOPEDIC WALK-IN VISIT 9/3/2019    Primary Care Physician: Dr. Pham     19- last CSI     Reason for visit:     What part of your body is injured / painful?  bilateral knee    What caused the injury /pain? NA     How long ago did your injury occur or pain begin? Years    What are your most bothersome symptoms? Pain    How would you characterize your symptom?  sharp    What makes your symptoms better? Nothing    What makes your symptoms worse? Walking    Have you been previously seen for this problem? Yes. Dom     Medical History:    Any recent changes to your medical history? No    Any new medication prescribed since last visit? No    Have you had surgery on this body part before? No    Social History:    Occupation: Retired     Handedness: Right    Exercise: None    Review of Systems:    Do you have fever, chills, weight loss? No    Do you have any vision problems? No    Do you have any chest pain or edema? YEs    Do you have any shortness of breath or wheezing?  Yes, COPD     Do you have stomach problems? No    Do you have any numbness or focal weakness? No    Do you have diabetes? No    Do you have problems with bleeding or clotting? No    Do you have an rashes or other skin lesions? No       PROCEDURE ENCOUNTER    [facility]  Orthopedics  Vitor Mcneal DO  September 3, 2019     Name: Jessica Ramsey  MRN: [id number]  Age: 78 year old  : 1940    Referring provider: N/A  Diagnosis: Bilateral primary osteoarthritis of knees    PROCEDURE    Bilateral Knee Injection - Intraarticular  The patient was informed of the risks and the benefits of the procedure and a written consent was  signed.  The patient s left knee was prepped with chlorhexidine in sterile fashion.   40 mg of triamcinolone suspension was drawn up into a 5 mL syringe with 4 mL of 1% lidocaine.  Injection was performed using substerile technique.  A 1.5-inch 22-gauge needle was used to enter the anterolateral aspect of the left knee.  Injection performed successfully without difficulty.  There were no complications. The patient tolerated the procedure well. There was negligible bleeding.   This procedure as above was repeated for the right knee.  The patient s right knee was prepped with chlorhexidine in sterile fashion. Injection was performed using substerile technique.  A 1.5-inch 22-gauge needle was used to enter the anterolateral aspect of the right knee.  Injection performed successfully without difficulty.  There were no complications. The patient tolerated the procedure well. There was negligible bleeding.   The patient was instructed to ice the knee upon leaving clinic and refrain from overuse over the next 3 days.   The patient was instructed to call or go to the emergency room with any unusual pain, swelling, redness, or if otherwise concerned.  A follow up appointment will be scheduled to evaluate response to the injection, and to assess range of motion and pain.    Vitor Mcneal DO CAQSM  Primary Care Sports Medicine  [facility]    Large Joint Injection/Arthocentesis: bilateral knee  Date/Time: 9/3/2019 3:38 PM  Performed by: Vitor Mcneal DO  Authorized by: Vitor Mcneal DO     Needle Size:  22 G  Guidance: landmark guided    Approach:  Anterolateral  Location:  Knee  Laterality:  Bilateral    Medications (Right):  40 mg triamcinolone 40 MG/ML   Medications (Right) comment:  Xylocaine 1%  NDC 76310-563-23  Lot [id number]  EXP 12/1/22    4mL  Medications (Left):  40 mg triamcinolone 40 MG/ML   Medications (Left) comment:  Xylocaine 1%  NDC 94575-090-34  Lot [id number]  EXP  12/1/22    4mL  Outcome:  Tolerated well, no immediate complications  Procedure discussed: discussed risks, benefits, and alternatives    Consent Given by:  Patient  Timeout: timeout called immediately prior to procedure    Prep: patient was prepped and draped in usual sterile fashion

## 2019-09-03 NOTE — PROGRESS NOTES
SPORTS & ORTHOPEDIC WALK-IN VISIT 9/3/2019    Primary Care Physician: Dr. Pham     19- last CSI     Reason for visit:     What part of your body is injured / painful?  bilateral knee    What caused the injury /pain? NA     How long ago did your injury occur or pain begin? Years    What are your most bothersome symptoms? Pain    How would you characterize your symptom?  sharp    What makes your symptoms better? Nothing    What makes your symptoms worse? Walking    Have you been previously seen for this problem? Yes. Dom     Medical History:    Any recent changes to your medical history? No    Any new medication prescribed since last visit? No    Have you had surgery on this body part before? No    Social History:    Occupation: Retired     Handedness: Right    Exercise: None    Review of Systems:    Do you have fever, chills, weight loss? No    Do you have any vision problems? No    Do you have any chest pain or edema? YEs    Do you have any shortness of breath or wheezing?  Yes, COPD     Do you have stomach problems? No    Do you have any numbness or focal weakness? No    Do you have diabetes? No    Do you have problems with bleeding or clotting? No    Do you have an rashes or other skin lesions? No       PROCEDURE ENCOUNTER    [facility]  Orthopedics  Vitor Mcneal DO  September 3, 2019     Name: Jessica Ramsey  MRN: [id number]  Age: 78 year old  : 1940    Referring provider: N/A  Diagnosis: Bilateral primary osteoarthritis of knees    PROCEDURE    Bilateral Knee Injection - Intraarticular  The patient was informed of the risks and the benefits of the procedure and a written consent was signed.  The patient s left knee was prepped with chlorhexidine in sterile fashion.   40 mg of triamcinolone suspension was drawn up into a 5 mL syringe with 4 mL of 1% lidocaine.  Injection was performed using substerile technique.  A 1.5-inch 22-gauge needle was used to enter the anterolateral aspect of  the left knee.  Injection performed successfully without difficulty.  There were no complications. The patient tolerated the procedure well. There was negligible bleeding.   This procedure as above was repeated for the right knee.  The patient s right knee was prepped with chlorhexidine in sterile fashion. Injection was performed using substerile technique.  A 1.5-inch 22-gauge needle was used to enter the anterolateral aspect of the right knee.  Injection performed successfully without difficulty.  There were no complications. The patient tolerated the procedure well. There was negligible bleeding.   The patient was instructed to ice the knee upon leaving clinic and refrain from overuse over the next 3 days.   The patient was instructed to call or go to the emergency room with any unusual pain, swelling, redness, or if otherwise concerned.  A follow up appointment will be scheduled to evaluate response to the injection, and to assess range of motion and pain.    Vitor Mcneal DO [facility]  Primary Care Sports Medicine  [facility]    Large Joint Injection/Arthocentesis: bilateral knee  Date/Time: 9/3/2019 3:38 PM  Performed by: Vitor Mcneal DO  Authorized by: Vitor Mcneal DO     Needle Size:  22 G  Guidance: landmark guided    Approach:  Anterolateral  Location:  Knee  Laterality:  Bilateral      Medications (Right):  40 mg triamcinolone 40 MG/ML   Medications (Right) comment:  Xylocaine 1%  ND 30330-964-05  Lot [id number]  EXP 12/1/22    4mL  Medications (Left):  40 mg triamcinolone 40 MG/ML   Medications (Left) comment:  Xylocaine 1%  NDC 39322-004-21  Lot [id number]  EXP 12/1/22    4mL  Outcome:  Tolerated well, no immediate complications  Procedure discussed: discussed risks, benefits, and alternatives    Consent Given by:  Patient  Timeout: timeout called immediately prior to procedure    Prep: patient was prepped and draped in usual sterile fashion

## 2019-10-02 ENCOUNTER — HEALTH MAINTENANCE LETTER (OUTPATIENT)
Age: 79
End: 2019-10-02

## 2019-12-16 ENCOUNTER — HEALTH MAINTENANCE LETTER (OUTPATIENT)
Age: 79
End: 2019-12-16

## 2021-01-15 ENCOUNTER — HEALTH MAINTENANCE LETTER (OUTPATIENT)
Age: 81
End: 2021-01-15

## 2021-08-26 PROCEDURE — U0005 INFEC AGEN DETEC AMPLI PROBE: HCPCS | Mod: ORL | Performed by: FAMILY MEDICINE

## 2021-08-27 ENCOUNTER — LAB REQUISITION (OUTPATIENT)
Dept: LAB | Facility: CLINIC | Age: 81
End: 2021-08-27
Payer: COMMERCIAL

## 2021-08-27 DIAGNOSIS — U07.1 COVID-19: ICD-10-CM

## 2021-08-28 LAB — SARS-COV-2 RNA RESP QL NAA+PROBE: NEGATIVE

## 2021-09-04 ENCOUNTER — HEALTH MAINTENANCE LETTER (OUTPATIENT)
Age: 81
End: 2021-09-04

## 2021-09-17 ENCOUNTER — HOSPITAL ENCOUNTER (EMERGENCY)
Facility: CLINIC | Age: 81
Discharge: HOME | End: 2021-09-17
Attending: EMERGENCY MEDICINE
Payer: COMMERCIAL

## 2021-09-17 VITALS
HEIGHT: 64 IN | SYSTOLIC BLOOD PRESSURE: 139 MMHG | OXYGEN SATURATION: 94 % | WEIGHT: 230 LBS | RESPIRATION RATE: 16 BRPM | BODY MASS INDEX: 39.27 KG/M2 | HEART RATE: 85 BPM | TEMPERATURE: 98.4 F | DIASTOLIC BLOOD PRESSURE: 80 MMHG

## 2021-09-17 DIAGNOSIS — Z53.21 PATIENT LEFT WITHOUT BEING SEEN: ICD-10-CM

## 2021-09-17 RX ORDER — PRAMIPEXOLE DIHYDROCHLORIDE 0.5 MG/1
2 TABLET ORAL AT BEDTIME
COMMUNITY
Start: 2021-08-24

## 2021-09-17 ASSESSMENT — MIFFLIN-ST. JEOR: SCORE: 1493.27

## 2021-09-17 NOTE — ED PROVIDER NOTES
Did not see or evaluate patient.  Patient left without being seen after triage but before being roomed or having a provider evaluation (we had been actively working with charge nurse and triage to find an alternative care location within the ED to be able to evaluate her, and upon doing so, went to go get the patient and she was unable to be found in the lobby after multiple calls).    Did call and leave a message at the patient's provided phone number, not mentioning any PHI, but encouraging her to return immediately/ASAP or call our ED phone number (phone number for the ED provided) so we can discuss.     Also called patient's listed PCP number to see if someone from her clinic can try to follow-up with her/make sure she gets any needed cares/follow-up. Contacted the number listed for her PCP, but they had no record of this patient in their system. In review of Care Everywhere, I see she does have an upcoming PCP appointment scheduled with a different system:     09/20/2021 Appointment Internal Medicine Sulaiman Luna, MBBS    [address] [place] [address]    [phone] (Work)    [phone] (Fax)       I contacted that clinic to see if they could contact her and see if she could be seen sooner or at least touch base with her given she LWBS and we were not able to see/evaluate her, make sure did not need anything, did not need to have her appointment moved up, have them encourage to return if having emergent  etc. They will contact her care team and speak w/ the patient to intervene as needed.  Very much appreciate their assistance.     Mariela Vela MD  09/19/21 [id number]

## 2021-09-17 NOTE — ED TRIAGE NOTES
"Pt cut right shin getting out of car yesterday and has been \"oozing ever since\". (has gone through 6 dressings). 2\" wound looks like drainage has stopped, redressed in triage. Hx lymphedema  "

## 2022-02-19 ENCOUNTER — HEALTH MAINTENANCE LETTER (OUTPATIENT)
Age: 82
End: 2022-02-19

## 2022-05-24 ENCOUNTER — LAB REQUISITION (OUTPATIENT)
Dept: LAB | Facility: CLINIC | Age: 82
End: 2022-05-24
Payer: COMMERCIAL

## 2022-05-24 DIAGNOSIS — U07.1 COVID-19: ICD-10-CM

## 2022-06-01 ENCOUNTER — LAB REQUISITION (OUTPATIENT)
Dept: LAB | Facility: CLINIC | Age: 82
End: 2022-06-01
Payer: COMMERCIAL

## 2022-06-01 DIAGNOSIS — U07.1 COVID-19: ICD-10-CM

## 2022-06-02 PROCEDURE — U0003 INFECTIOUS AGENT DETECTION BY NUCLEIC ACID (DNA OR RNA); SEVERE ACUTE RESPIRATORY SYNDROME CORONAVIRUS 2 (SARS-COV-2) (CORONAVIRUS DISEASE [COVID-19]), AMPLIFIED PROBE TECHNIQUE, MAKING USE OF HIGH THROUGHPUT TECHNOLOGIES AS DESCRIBED BY CMS-2020-01-R: HCPCS | Mod: ORL | Performed by: INTERNAL MEDICINE

## 2022-06-03 LAB — SARS-COV-2 RNA RESP QL NAA+PROBE: NEGATIVE

## 2022-06-15 ENCOUNTER — LAB REQUISITION (OUTPATIENT)
Dept: LAB | Facility: CLINIC | Age: 82
End: 2022-06-15
Payer: COMMERCIAL

## 2022-06-15 DIAGNOSIS — U07.1 COVID-19: ICD-10-CM

## 2022-06-16 PROCEDURE — U0003 INFECTIOUS AGENT DETECTION BY NUCLEIC ACID (DNA OR RNA); SEVERE ACUTE RESPIRATORY SYNDROME CORONAVIRUS 2 (SARS-COV-2) (CORONAVIRUS DISEASE [COVID-19]), AMPLIFIED PROBE TECHNIQUE, MAKING USE OF HIGH THROUGHPUT TECHNOLOGIES AS DESCRIBED BY CMS-2020-01-R: HCPCS | Mod: ORL | Performed by: INTERNAL MEDICINE

## 2022-06-17 LAB — SARS-COV-2 RNA RESP QL NAA+PROBE: NEGATIVE

## 2022-06-22 ENCOUNTER — LAB REQUISITION (OUTPATIENT)
Dept: LAB | Facility: CLINIC | Age: 82
End: 2022-06-22
Payer: COMMERCIAL

## 2022-06-22 DIAGNOSIS — U07.1 COVID-19: ICD-10-CM

## 2022-06-23 PROCEDURE — U0005 INFEC AGEN DETEC AMPLI PROBE: HCPCS | Mod: ORL | Performed by: INTERNAL MEDICINE

## 2022-06-24 LAB — SARS-COV-2 RNA RESP QL NAA+PROBE: NEGATIVE

## 2022-08-21 ENCOUNTER — ANCILLARY PROCEDURE (OUTPATIENT)
Dept: GENERAL RADIOLOGY | Facility: CLINIC | Age: 82
End: 2022-08-21
Attending: PHYSICIAN ASSISTANT
Payer: COMMERCIAL

## 2022-08-21 ENCOUNTER — OFFICE VISIT (OUTPATIENT)
Dept: URGENT CARE | Facility: URGENT CARE | Age: 82
End: 2022-08-21
Payer: COMMERCIAL

## 2022-08-21 VITALS
DIASTOLIC BLOOD PRESSURE: 77 MMHG | WEIGHT: 230 LBS | OXYGEN SATURATION: 98 % | BODY MASS INDEX: 39.48 KG/M2 | SYSTOLIC BLOOD PRESSURE: 120 MMHG | HEART RATE: 71 BPM | TEMPERATURE: 98.1 F

## 2022-08-21 DIAGNOSIS — T14.8XXA BRUISING: ICD-10-CM

## 2022-08-21 DIAGNOSIS — M25.532 LEFT WRIST PAIN: ICD-10-CM

## 2022-08-21 DIAGNOSIS — M25.532 LEFT WRIST PAIN: Primary | ICD-10-CM
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
## 2022-08-21 PROCEDURE — 99203 OFFICE O/P NEW LOW 30 MIN: CPT | Performed by: PHYSICIAN ASSISTANT

## 2022-08-21 PROCEDURE — 73110 X-RAY EXAM OF WRIST: CPT | Mod: TC | Performed by: RADIOLOGY

## 2022-08-21 NOTE — PROGRESS NOTES
SUBJECTIVE:  Chief Complaint   Patient presents with     Urgent Care     Arm Pain     C/O arm pain for 2 days     Jessica Ramsey is a 81 year old female presents with a chief complaint of left wrist pain, swelling, tenderness and decreased range of motion.  The injury occurred 3 day(s) ago.   The injury happened while at home. How: fall delayed pain.  The patient complained of moderate pain  and has had decreased ROM.  Pain exacerbated by weight-bearing and movement.  Relieved by rest.  She treated it initially with no therapy. This is the first time this type of injury has occurred to this patient.     Past Medical History:   Diagnosis Date     Arthritis      Asthma      Degenerative joint disease      Depression      Hoarseness      Hypertension      Indigestion      Nasal congestion      Night sweats      Numbness      Pneumonia      Ringing in ears      Sleep problems      Snoring      Sore throat      Trouble swallowing      Unspecified cerebral artery occlusion with cerebral infarction      Weakness      Weight gain      Current Outpatient Medications   Medication Sig Dispense Refill     acetaminophen (TYLENOL) 650 MG CR tablet Take 650 mg by mouth 4 times daily as needed.       albuterol (2.5 MG/3ML) 0.083% nebulizer solution Take 3 mLs by nebulization every 6 hours as needed for shortness of breath / dyspnea for 30 doses. 30 vial 2     albuterol (ACCUNEB) 0.63 MG/3ML nebulizer solution Take 3 mLs by nebulization every 6 hours as needed for shortness of breath / dyspnea. Use 1 unit dose in nebulizer every 4-6 hours as needed 1 Box 5     aspirin 325 MG tablet Take 325 mg by mouth daily.       atorvastatin (LIPITOR) 40 MG tablet Take 40 mg by mouth       CALCIUM & MAGNESIUM CARBONATES PO        Cholecalciferol (VITAMIN D) 2000 units tablet Take 1 tablet by mouth       COENZYME Q-10 PO        Escitalopram Oxalate (LEXAPRO PO) Take 20 mg by mouth daily       furosemide (LASIX) 20 MG tablet Take 25 mg by mouth  daily       GABAPENTIN PO        IBUPROFEN PO Take 200 mg by mouth Take three tablets as needed for pain, once or twice a day everyday       losartan (COZAAR) 25 MG tablet Take 1 tablet by mouth daily. 90 tablet 3     metoprolol succinate ER (TOPROL-XL) 25 MG 24 hr tablet TK 1 T PO  ONCE D  0     Multiple Vitamins-Minerals (PRESERVISION AREDS PO) Take 1 tablet by mouth daily       naproxen (NAPROSYN) 500 MG tablet Take 1 tablet (500 mg) by mouth 2 times daily (with meals) 60 tablet 1     omeprazole (PRILOSEC) 20 MG capsule Take 1 capsule by mouth 2 times daily. 180 capsule 3     ORDER FOR DME Equipment being ordered: Nebulizer 1 each 0     phenazopyridine (PYRIDIUM) 200 MG tablet TK 1 T PO TID FOR 2 DAYS  0     pramipexole (MIRAPEX) 0.5 MG tablet Take 2 mg by mouth At Bedtime       PROAIR  (90 BASE) MCG/ACT IN AERS INHALE 1 TO 2 PUFFS EVERY 4 TO 6 HOURS AS NEEDED 2 Inhaler 0     Sertraline HCl (ZOLOFT PO) Take 150 mg by mouth daily        simvastatin (ZOCOR) 40 MG tablet Take 1 tablet by mouth At Bedtime. 90 tablet 3     Tiotropium Bromide Monohydrate (SPIRIVA HANDIHALER IN) Once daily inhale into lungs       traZODone (DESYREL) 100 MG tablet Take 1 tablet by mouth nightly as needed for sleep. As directed 30 tablet 5     vitamin D (ERGOCALCIFEROL) 65477 UNIT capsule Take 1 capsule (50,000 Units) by mouth once a week 12 capsule 1     Social History     Tobacco Use     Smoking status: Former Smoker     Packs/day: 1.00     Years: 18.00     Pack years: 18.00     Types: Cigarettes     Smokeless tobacco: Never Used   Substance Use Topics     Alcohol use: No       ROS:  Review of systems negative except as stated above.    EXAM:   /77   Pulse 71   Temp 98.1  F (36.7  C) (Tympanic)   Wt 104.3 kg (230 lb)   SpO2 98%   BMI 39.48 kg/m    Gen: healthy,alert,no distress  Extremity: wrist has erythema, swelling, FROM and pain with flexion/xtension/suppination/pronation.   There is not compromise to the distal  circulation.  Pulses are +2 and CRT is brisk  GENERAL APPEARANCE: healthy, alert and no distress  CHEST: clear to auscultation  CV: regular rate and rhythm  EXTREMITIES: peripheral pulses normal  MS: no gross deformities noted, no evidence of inflammation in joints, FROM in all extremities.  SKIN: bruise  NEURO: Normal strength and tone, sensory exam grossly normal, mentation intact and speech normal    X-ray image initially interpreted in clinic by me in order to rule out fracture/dislocation.  No evidence appreciated.        ASSESSMENT:   (M25.532) Left wrist pain  (primary encounter diagnosis)  Comment: appearance of occult distal radius fracture  Plan: XR Wrist Left G/E 3 Views, Wrist/Arm/Hand         Supplies Order for DME - ONLY FOR DME,         Orthopedic  Referral      (T14.8XXA) Bruising  (W19.XXXA) Fall, initial encounter

## 2022-09-14 ENCOUNTER — OFFICE VISIT (OUTPATIENT)
Dept: URGENT CARE | Facility: URGENT CARE | Age: 82
End: 2022-09-14
Payer: COMMERCIAL

## 2022-09-14 VITALS
HEART RATE: 94 BPM | SYSTOLIC BLOOD PRESSURE: 136 MMHG | TEMPERATURE: 97.4 F | OXYGEN SATURATION: 96 % | DIASTOLIC BLOOD PRESSURE: 69 MMHG

## 2022-09-14 DIAGNOSIS — L03.119 CELLULITIS OF LOWER EXTREMITY, UNSPECIFIED LATERALITY: ICD-10-CM

## 2022-09-14 DIAGNOSIS — I87.2 VENOUS STASIS DERMATITIS OF BOTH LOWER EXTREMITIES: Primary | ICD-10-CM

## 2022-09-14 PROCEDURE — 99213 OFFICE O/P EST LOW 20 MIN: CPT | Performed by: PHYSICIAN ASSISTANT

## 2022-09-14 RX ORDER — TRIAMCINOLONE ACETONIDE 1 MG/G
CREAM TOPICAL 2 TIMES DAILY
Qty: 80 G | Refills: 0 | Status: SHIPPED | OUTPATIENT
Start: 2022-09-14

## 2022-09-14 RX ORDER — CEPHALEXIN 500 MG/1
500 CAPSULE ORAL 3 TIMES DAILY
Qty: 30 CAPSULE | Refills: 0 | Status: SHIPPED | OUTPATIENT
Start: 2022-09-14 | End: 2022-09-24

## 2022-09-14 NOTE — PROGRESS NOTES
Venous stasis dermatitis of both lower extremities  - triamcinolone (KENALOG) 0.1 % external cream; Apply topically 2 times daily    Cellulitis of lower extremity, unspecified laterality  - cephALEXin (KEFLEX) 500 MG capsule; Take 1 capsule (500 mg) by mouth 3 times daily for 10 days        Cellulitis  Cellulitis is an infection of the deep layers of skin. A break in the skin, such as a cut or scratch, can let bacteria under the skin. If the bacteria get to deep layers of the skin, it can be serious. If not treated, cellulitis can get into the bloodstream and lymph nodes. The infection can then spread throughout the body. This causes serious illness.   Cellulitis causes the affected skin to become red, swollen, warm, and sore. The reddened areas have a visible border. An open sore may leak fluid (pus). You may have a fever, chills, and pain.   Cellulitis is treated with antibiotics taken for 7 to 10 days. An open sore may be cleaned and covered with cool wet gauze. Symptoms should get better 1 to 2 days after treatment is started. Make sure to take all the antibiotics for the full number of days until they are gone. Keep taking the medicine even if your symptoms go away.   Home care  Follow these tips:    Limit the use of the part of your body with cellulitis.     If the infection is on your leg, keep your leg raised while sitting. This helps reduce swelling.    Take all of the antibiotic medicine exactly as directed until it is gone. Don't miss any doses, especially during the first 7 days. Don t stop taking the medicine when your symptoms get better.    Keep the affected area clean and dry.    Wash your hands with soap and clean, running water before and after touching your skin. Anyone else who touches your skin should also wash his or her hands. Don't share towels.  Follow-up care  Follow up with your healthcare provider, or as advised. If your infection doesn't go away on the first antibiotic, your healthcare  provider will prescribe a different one.   When to seek medical advice  Call your healthcare provider right away if any of these occur:    Red areas that spread    Swelling or pain that gets worse    Fluid leaking from the skin (pus)    Fever higher of 100.4  F (38.0  C) or higher after 2 days on antibiotics  Laura last reviewed this educational content on 8/1/2019 2000-2021 [employer]. All rights reserved. This information is not intended as a substitute for professional medical care. Always follow your healthcare professional's instructions.               Brendon Caro PA-C  [facility] URGENT [facility]    Subjective   81 year old who presents to clinic today for the following health issues:    Cellulitis       HPI     Pt uses velcro wraps on legs, when pt took them off about a month ago legs were red/irritated, swelling, warm.  Patient denies any worsening symptoms.  She states that her legs have felt more achy lately but not necessarily tender to touch.  She denies any fever chills or body aches.  Patient was told by podiatrist that she may have cellulitis and this worried her.  Patient states that her lower legs have been quite itchy lately and dry.    Review of Systems   Review of Systems   See HPI     Objective    Temp: 97.4  F (36.3  C) Temp src: Temporal BP: 136/69 Pulse: 94     SpO2: 96 %     There is no height or weight on file to calculate BMI.    Physical Exam   Physical Exam  Constitutional:       General: She is not in acute distress.     Appearance: Normal appearance. She is not ill-appearing, toxic-appearing or diaphoretic.   HENT:      Head: Normocephalic and atraumatic.   Cardiovascular:      Rate and Rhythm: Normal rate.      Pulses: Normal pulses.   Skin:            Comments: Patient has erythema, dry skin, and swelling in the area shown above.  The legs do not seem to be very warm to touch or tender to touch.  There is no focal points that are tender.  No cuts or bites  noted.   Neurological:      General: No focal deficit present.      Mental Status: She is alert and oriented to person, place, and time. Mental status is at baseline.      Gait: Gait normal.   Psychiatric:         Mood and Affect: Mood normal.         Behavior: Behavior normal.         Thought Content: Thought content normal.         Judgment: Judgment normal.          No results found for this or any previous visit (from the past 24 hour(s)).

## 2022-10-22 ENCOUNTER — HEALTH MAINTENANCE LETTER (OUTPATIENT)
Age: 82
End: 2022-10-22

## 2022-11-16 ENCOUNTER — HOSPITAL ENCOUNTER (EMERGENCY)
Facility: CLINIC | Age: 82
Discharge: HOME | End: 2022-11-17
Attending: EMERGENCY MEDICINE | Admitting: EMERGENCY MEDICINE
Payer: COMMERCIAL

## 2022-11-16 ENCOUNTER — APPOINTMENT (OUTPATIENT)
Dept: GENERAL RADIOLOGY | Facility: CLINIC | Age: 82
End: 2022-11-16
Attending: EMERGENCY MEDICINE
Payer: COMMERCIAL

## 2022-11-16 DIAGNOSIS — R05.1 ACUTE COUGH: ICD-10-CM

## 2022-11-16 DIAGNOSIS — J06.9 UPPER RESPIRATORY TRACT INFECTION, UNSPECIFIED TYPE: ICD-10-CM

## 2022-11-16 DIAGNOSIS — I10 PRIMARY HYPERTENSION: ICD-10-CM

## 2022-11-16 LAB
ALBUMIN SERPL BCG-MCNC: 3.9 G/DL (ref 3.5–5.2)
ALP SERPL-CCNC: 114 U/L (ref 35–104)
ALT SERPL W P-5'-P-CCNC: 13 U/L (ref 10–35)
ANION GAP SERPL CALCULATED.3IONS-SCNC: 10 MMOL/L (ref 7–15)
AST SERPL W P-5'-P-CCNC: 23 U/L (ref 10–35)
ATRIAL RATE - MUSE: 76 BPM
BASOPHILS # BLD AUTO: 0 10E3/UL (ref 0–0.2)
BASOPHILS NFR BLD AUTO: 0 %
BILIRUB SERPL-MCNC: 0.3 MG/DL
BUN SERPL-MCNC: 10.9 MG/DL (ref 8–23)
CALCIUM SERPL-MCNC: 8.7 MG/DL (ref 8.8–10.2)
CHLORIDE SERPL-SCNC: 103 MMOL/L (ref 98–107)
CREAT SERPL-MCNC: 0.72 MG/DL (ref 0.51–0.95)
DEPRECATED HCO3 PLAS-SCNC: 28 MMOL/L (ref 22–29)
DIASTOLIC BLOOD PRESSURE - MUSE: NORMAL MMHG
EOSINOPHIL # BLD AUTO: 0.2 10E3/UL (ref 0–0.7)
EOSINOPHIL NFR BLD AUTO: 2 %
ERYTHROCYTE [DISTWIDTH] IN BLOOD BY AUTOMATED COUNT: 14 % (ref 10–15)
FLUAV RNA SPEC QL NAA+PROBE: NEGATIVE
FLUBV RNA RESP QL NAA+PROBE: NEGATIVE
GFR SERPL CREATININE-BSD FRML MDRD: 83 ML/MIN/1.73M2
GLUCOSE SERPL-MCNC: 97 MG/DL (ref 70–99)
HCT VFR BLD AUTO: 37.7 % (ref 35–47)
HGB BLD-MCNC: 11.8 G/DL (ref 11.7–15.7)
IMM GRANULOCYTES # BLD: 0 10E3/UL
IMM GRANULOCYTES NFR BLD: 0 %
INTERPRETATION ECG - MUSE: NORMAL
LYMPHOCYTES # BLD AUTO: 1.6 10E3/UL (ref 0.8–5.3)
LYMPHOCYTES NFR BLD AUTO: 20 %
MCH RBC QN AUTO: 27.8 PG (ref 26.5–33)
MCHC RBC AUTO-ENTMCNC: 31.3 G/DL (ref 31.5–36.5)
MCV RBC AUTO: 89 FL (ref 78–100)
MONOCYTES # BLD AUTO: 0.5 10E3/UL (ref 0–1.3)
MONOCYTES NFR BLD AUTO: 6 %
NEUTROPHILS # BLD AUTO: 5.7 10E3/UL (ref 1.6–8.3)
NEUTROPHILS NFR BLD AUTO: 72 %
NRBC # BLD AUTO: 0 10E3/UL
NRBC BLD AUTO-RTO: 0 /100
P AXIS - MUSE: 62 DEGREES
PLATELET # BLD AUTO: 238 10E3/UL (ref 150–450)
POTASSIUM SERPL-SCNC: 3.1 MMOL/L (ref 3.4–5.3)
PR INTERVAL - MUSE: 174 MS
PROT SERPL-MCNC: 6.4 G/DL (ref 6.4–8.3)
QRS DURATION - MUSE: 84 MS
QT - MUSE: 392 MS
QTC - MUSE: 441 MS
R AXIS - MUSE: -7 DEGREES
RBC # BLD AUTO: 4.25 10E6/UL (ref 3.8–5.2)
RSV RNA SPEC NAA+PROBE: NEGATIVE
SARS-COV-2 RNA RESP QL NAA+PROBE: NEGATIVE
SODIUM SERPL-SCNC: 141 MMOL/L (ref 136–145)
SYSTOLIC BLOOD PRESSURE - MUSE: NORMAL MMHG
T AXIS - MUSE: 8 DEGREES
TROPONIN T SERPL HS-MCNC: 16 NG/L
TROPONIN T SERPL HS-MCNC: 17 NG/L
VENTRICULAR RATE- MUSE: 76 BPM
WBC # BLD AUTO: 7.9 10E3/UL (ref 4–11)

## 2022-11-16 PROCEDURE — 36415 COLL VENOUS BLD VENIPUNCTURE: CPT | Performed by: EMERGENCY MEDICINE

## 2022-11-16 PROCEDURE — 99285 EMERGENCY DEPT VISIT HI MDM: CPT | Mod: CS,25 | Performed by: NURSE PRACTITIONER

## 2022-11-16 PROCEDURE — 84484 ASSAY OF TROPONIN QUANT: CPT | Performed by: EMERGENCY MEDICINE

## 2022-11-16 PROCEDURE — 85025 COMPLETE CBC W/AUTO DIFF WBC: CPT | Performed by: EMERGENCY MEDICINE

## 2022-11-16 PROCEDURE — 80053 COMPREHEN METABOLIC PANEL: CPT | Performed by: EMERGENCY MEDICINE

## 2022-11-16 PROCEDURE — 93010 ELECTROCARDIOGRAM REPORT: CPT | Performed by: NURSE PRACTITIONER

## 2022-11-16 PROCEDURE — 84484 ASSAY OF TROPONIN QUANT: CPT | Performed by: NURSE PRACTITIONER

## 2022-11-16 PROCEDURE — 36415 COLL VENOUS BLD VENIPUNCTURE: CPT | Performed by: NURSE PRACTITIONER

## 2022-11-16 PROCEDURE — 71046 X-RAY EXAM CHEST 2 VIEWS: CPT

## 2022-11-16 PROCEDURE — 250N000013 HC RX MED GY IP 250 OP 250 PS 637: Performed by: NURSE PRACTITIONER

## 2022-11-16 PROCEDURE — 71046 X-RAY EXAM CHEST 2 VIEWS: CPT | Mod: 26 | Performed by: RADIOLOGY

## 2022-11-16 PROCEDURE — C9803 HOPD COVID-19 SPEC COLLECT: HCPCS | Performed by: NURSE PRACTITIONER

## 2022-11-16 PROCEDURE — 87637 SARSCOV2&INF A&B&RSV AMP PRB: CPT | Performed by: EMERGENCY MEDICINE

## 2022-11-16 PROCEDURE — 99284 EMERGENCY DEPT VISIT MOD MDM: CPT | Mod: CS | Performed by: NURSE PRACTITIONER

## 2022-11-16 PROCEDURE — 82040 ASSAY OF SERUM ALBUMIN: CPT | Performed by: EMERGENCY MEDICINE

## 2022-11-16 PROCEDURE — 93005 ELECTROCARDIOGRAM TRACING: CPT | Performed by: NURSE PRACTITIONER

## 2022-11-16 RX ORDER — PREDNISONE 20 MG/1
40 TABLET ORAL ONCE
Status: COMPLETED | OUTPATIENT
Start: 2022-11-16 | End: 2022-11-17

## 2022-11-16 RX ORDER — BENZONATATE 100 MG/1
100 CAPSULE ORAL 3 TIMES DAILY PRN
Qty: 30 CAPSULE | Refills: 0 | Status: SHIPPED | OUTPATIENT
Start: 2022-11-16

## 2022-11-16 RX ORDER — LOSARTAN POTASSIUM 25 MG/1
25 TABLET ORAL ONCE
Status: COMPLETED | OUTPATIENT
Start: 2022-11-16 | End: 2022-11-16

## 2022-11-16 RX ORDER — LOSARTAN POTASSIUM 25 MG/1
25 TABLET ORAL DAILY
Qty: 30 TABLET | Refills: 0 | Status: SHIPPED | OUTPATIENT
Start: 2022-11-16 | End: 2022-12-16

## 2022-11-16 RX ORDER — POTASSIUM CHLORIDE 20MEQ/15ML
60 LIQUID (ML) ORAL ONCE
Status: COMPLETED | OUTPATIENT
Start: 2022-11-16 | End: 2022-11-16

## 2022-11-16 RX ORDER — ALBUTEROL SULFATE 90 UG/1
2 AEROSOL, METERED RESPIRATORY (INHALATION) EVERY 6 HOURS PRN
Qty: 6.7 G | Refills: 0 | Status: SHIPPED | OUTPATIENT
Start: 2022-11-16

## 2022-11-16 RX ORDER — PREDNISONE 20 MG/1
40 TABLET ORAL DAILY
Qty: 6 TABLET | Refills: 0 | Status: SHIPPED | OUTPATIENT
Start: 2022-11-17 | End: 2022-11-20

## 2022-11-16 RX ADMIN — LOSARTAN POTASSIUM 25 MG: 25 TABLET, FILM COATED ORAL at 22:56

## 2022-11-16 RX ADMIN — POTASSIUM CHLORIDE 60 MEQ: 20 SOLUTION ORAL at 22:22

## 2022-11-16 ASSESSMENT — ENCOUNTER SYMPTOMS
COUGH: 1
WHEEZING: 0
SHORTNESS OF BREATH: 1
DIZZINESS: 0
NAUSEA: 0
BACK PAIN: 0
FREQUENCY: 0
CHILLS: 0
VOMITING: 0
SORE THROAT: 0
LIGHT-HEADEDNESS: 0
FEVER: 0
ABDOMINAL PAIN: 0

## 2022-11-16 ASSESSMENT — ACTIVITIES OF DAILY LIVING (ADL)
ADLS_ACUITY_SCORE: 35
ADLS_ACUITY_SCORE: 35

## 2022-11-17 VITALS
HEIGHT: 64 IN | OXYGEN SATURATION: 98 % | HEART RATE: 73 BPM | TEMPERATURE: 98.8 F | WEIGHT: 220 LBS | DIASTOLIC BLOOD PRESSURE: 87 MMHG | RESPIRATION RATE: 22 BRPM | SYSTOLIC BLOOD PRESSURE: 142 MMHG | BODY MASS INDEX: 37.56 KG/M2

## 2022-11-17 PROCEDURE — 250N000012 HC RX MED GY IP 250 OP 636 PS 637: Performed by: EMERGENCY MEDICINE

## 2022-11-17 RX ADMIN — PREDNISONE 40 MG: 20 TABLET ORAL at 00:01

## 2022-11-17 NOTE — ED NOTES
Pt signed out by MERVAT Pendleton with plan to reassess and f/u 2nd trop. 1st was non-diagnostic and remaining w/u unrevealing and reassuring. 2nd trop without significant elevation so ruled out for MI at this time. On reassessment, noted to be wheezing. Will give prednisone here and discharge with burst in addition to prescribed meds per plan prior to signout. Ok to discharge      Called to bedside by RN to evaluate lower extremities with pt noting redness to L leg. On exam, splotchy redness with min edema, no warmth noted and is actually bilateral. Most c/w venous stasis dermatitis. No indication for further labs, imaging, or medications at this time.      Trey Buckner MD  11/17/22 [id number]

## 2022-11-17 NOTE — ED TRIAGE NOTES
"ED Triage Provider Note  [facility]  Encounter Date: Nov 16, 2022    History:  Chief Complaint   Patient presents with     Shortness of Breath     Jessica Ramsey is a 81 year old female who presents to the ED with shortness of breath and with increased sputum over the past 2 days with multiple sick contacts.  Patient reports that many people she has been around have tested positive for COVID.  She had a negative home COVID test.  She reports he has a history of COPD although her most recent appointment with her pulmonologist terminated with the pulmonologist stating that she no longer had COPD.  No chest pain currently.  Has been using all of her medications appropriately, has not run out.    Review of Systems:  Positive shortness of breath, positive increased productive cough    Exam:  BP (!) 187/75   Pulse 72   Temp 98.8  F (37.1  C) (Oral)   Resp 20   Ht 1.626 m (5' 4\")   Wt 99.8 kg (220 lb)   SpO2 98%   BMI 37.76 kg/m    General: No acute distress. Appears stated age.   Cardio: Regular rate, extremities well perfused, no murmurs  Resp: Normal work of breathing, grossly normal respiratory rate, no wheezing, decreased breath sounds globally  Neuro: Alert. CN II-XII grossly intact. Grossly intact strength.   Lower extremities: Bilateral lymphedema    Medical Decision Making:  Patient arriving to the ED with problem as above. A medical screening exam was performed.  Labs and imaging orders initiated from Triage. The patient is appropriate to wait in triage.       GABRIEL BOGGS MD on 11/16/2022 at 7:38 PM        "

## 2022-11-17 NOTE — ED PROVIDER NOTES
ED Provider Note  [facility]      History     Chief Complaint   Patient presents with     Shortness of Breath     HPI  Jessica Ramsey is a 81 year old female who presents to the ED with productive cough, shortness of breath since yesterday with multiple known sick contacts.     She reports multiple individuals around have tested positive for COVID, and she did have negative home COVID test.  She reports she has a history of COPD but reports she was told by her pulmonologist she no longer had COPD.  She reports she did use her albuterol inhaler with improvement in her symptoms and denies any wheezing at this time. She denies chest pain, headache. Does report she is no good at taking medications and currently reports she only takes lasix, sertraline and simvastatin. Is not taking cozaar or metroprolol. She reports this is one reason she is attempting to move into assisted living, to help with her medications.    Past Medical History  Past Medical History:   Diagnosis Date     Arthritis      Asthma      Degenerative joint disease      Depression      Hoarseness      Hypertension      Indigestion      Nasal congestion      Night sweats      Numbness      Pneumonia      Ringing in ears      Sleep problems      Snoring      Sore throat      Trouble swallowing      Unspecified cerebral artery occlusion with cerebral infarction      Weakness      Weight gain      Past Surgical History:   Procedure Laterality Date     BACK SURGERY       HYSTERECTOMY       ORTHOPEDIC SURGERY       ME SPINE SURGERY PROCEDURE UNLISTED       ZZC STOMACH SURGERY PROCEDURE UNLISTED       albuterol (PROAIR HFA/PROVENTIL HFA/VENTOLIN HFA) 108 (90 Base) MCG/ACT inhaler  benzonatate (TESSALON) 100 MG capsule  losartan (COZAAR) 25 MG tablet  predniSONE (DELTASONE) 20 MG tablet  acetaminophen (TYLENOL) 650 MG CR tablet  albuterol (2.5 MG/3ML) 0.083% nebulizer solution  albuterol (ACCUNEB) 0.63 MG/3ML nebulizer  "solution  aspirin 325 MG tablet  atorvastatin (LIPITOR) 40 MG tablet  CALCIUM & MAGNESIUM CARBONATES PO  Cholecalciferol (VITAMIN D) 2000 units tablet  COENZYME Q-10 PO  Escitalopram Oxalate (LEXAPRO PO)  furosemide (LASIX) 20 MG tablet  GABAPENTIN PO  IBUPROFEN PO  metoprolol succinate ER (TOPROL-XL) 25 MG 24 hr tablet  Multiple Vitamins-Minerals (PRESERVISION AREDS PO)  naproxen (NAPROSYN) 500 MG tablet  omeprazole (PRILOSEC) 20 MG capsule  ORDER FOR DME  phenazopyridine (PYRIDIUM) 200 MG tablet  pramipexole (MIRAPEX) 0.5 MG tablet  Sertraline HCl (ZOLOFT PO)  simvastatin (ZOCOR) 40 MG tablet  Tiotropium Bromide Monohydrate (SPIRIVA HANDIHALER IN)  traZODone (DESYREL) 100 MG tablet  triamcinolone (KENALOG) 0.1 % external cream  vitamin D (ERGOCALCIFEROL) 68385 UNIT capsule      Allergies   Allergen Reactions     Lyrica [Pregabalin]      \"felt like I was high and wanted to sleep a lot and did not help with the pain.\"     Niacin      Rapid heartbeat     Sulfa Drugs      Unknown     Tramadol      Dizziness      Naproxen Rash     Patient reported     Family History  Family History   Problem Relation Age of Onset     Asthma Father      Cerebrovascular Disease Father      Obesity Father      Low Back Problems Other      Social History   Social History     Tobacco Use     Smoking status: Former     Packs/day: 1.00     Years: 18.00     Pack years: 18.00     Types: Cigarettes     Smokeless tobacco: Never   Substance Use Topics     Alcohol use: No     Drug use: No      Past medical history, past surgical history, medications, allergies, family history, and social history were reviewed with the patient. No additional pertinent items.       Review of Systems   Constitutional: Negative for chills and fever.   HENT: Negative for sore throat.    Respiratory: Positive for cough and shortness of breath. Negative for wheezing.    Cardiovascular: Negative for chest pain.   Gastrointestinal: Negative for abdominal pain, nausea and " "vomiting.   Genitourinary: Negative for frequency.   Musculoskeletal: Negative for back pain.   Neurological: Negative for dizziness and light-headedness.     A complete review of systems was performed with pertinent positives and negatives noted in the HPI, and all other systems negative.    Physical Exam   BP: (!) 187/75  Pulse: 72  Temp: 98.8  F (37.1  C)  Resp: 20  Height: 162.6 cm (5' 4\")  Weight: 99.8 kg (220 lb)  SpO2: 98 %  Physical Exam  Vitals and nursing note reviewed.   HENT:      Head: Normocephalic.   Cardiovascular:      Rate and Rhythm: Normal rate and regular rhythm.      Pulses: Normal pulses.      Heart sounds: Normal heart sounds.   Pulmonary:      Effort: Pulmonary effort is normal.      Breath sounds: Normal breath sounds.   Abdominal:      General: Bowel sounds are normal.      Palpations: Abdomen is soft.      Tenderness: There is no abdominal tenderness.   Musculoskeletal:      Cervical back: Normal range of motion and neck supple.   Skin:     General: Skin is warm.   Psychiatric:         Mood and Affect: Mood normal.         ED Course      Procedures            EKG Interpretation:      Interpreted by JOSE DANIEL Valdivia CNP and reviewed by Dr. Mckeon  Time reviewed: 1955  Symptoms at time of EKG: none   Rhythm: normal sinus   Rate: normal  Axis: normal  Ectopy: none  Conduction: normal  ST Segments/ T Waves: No ST-T wave changes  Q Waves: none  Comparison to prior: Unchanged from 08/31/11  Clinical Impression: normal EKG         Results for orders placed or performed during the hospital encounter of 11/16/22   XR Chest 2 Views     Status: None    Narrative    EXAM: XR CHEST 2 VIEWS  11/16/2022 8:18 PM     HISTORY:  shortness of breath, history of COPD       COMPARISON:  Chest radiograph 1/24/2013    FINDINGS:     AP and lateral views of the chest. Trachea is midline.  Cardiomediastinal silhouette is within normal limits. Mild left  basilar streaky opacities, likely atelectasis. Mild left " streaky  opacities. No pleural effusion. No pneumothorax.    Moderate degenerative disease of bilateral shoulders. Sclerosis of the  right humeral head. No acute fracture. Visualized upper abdomen is  unremarkable.        Impression    IMPRESSION:   1. Mild left basilar streaky opacities, favoring atelectasis.  2. Sclerosis of the right humeral head suspicious for avascular  necrosis.     I have personally reviewed the examination and initial interpretation  and I agree with the findings.    LAUREN HOFFMAN MD         SYSTEM ID:  [id number]   Comprehensive metabolic panel     Status: Abnormal   Result Value Ref Range    Sodium 141 136 - 145 mmol/L    Potassium 3.1 (L) 3.4 - 5.3 mmol/L    Chloride 103 98 - 107 mmol/L    Carbon Dioxide (CO2) 28 22 - 29 mmol/L    Anion Gap 10 7 - 15 mmol/L    Urea Nitrogen 10.9 8.0 - 23.0 mg/dL    Creatinine 0.72 0.51 - 0.95 mg/dL    Calcium 8.7 (L) 8.8 - 10.2 mg/dL    Glucose 97 70 - 99 mg/dL    Alkaline Phosphatase 114 (H) 35 - 104 U/L    AST 23 10 - 35 U/L    ALT 13 10 - 35 U/L    Protein Total 6.4 6.4 - 8.3 g/dL    Albumin 3.9 3.5 - 5.2 g/dL    Bilirubin Total 0.3 <=1.2 mg/dL    GFR Estimate 83 >60 mL/min/1.73m2   Troponin T, High Sensitivity     Status: Abnormal   Result Value Ref Range    Troponin T, High Sensitivity 16 (H) <=14 ng/L   Symptomatic; Yes; 11/15/2022 Influenza A/B & SARS-CoV2 (COVID-19) Virus PCR Multiplex Nose     Status: Normal    Specimen: Nose; Swab   Result Value Ref Range    Influenza A PCR Negative Negative    Influenza B PCR Negative Negative    RSV PCR Negative Negative    SARS CoV2 PCR Negative Negative    Narrative    Testing was performed using the Xpert Xpress CoV2/Flu/RSV Assay on the Cepheid GeneXpert Instrument. This test should be ordered for the detection of SARS-CoV-2 and influenza viruses in individuals who meet clinical and/or epidemiological criteria. Test performance is unknown in asymptomatic patients. This test is for in vitro diagnostic use  under the FDA EUA for laboratories certified under CLIA to perform high or moderate complexity testing. This test has not been FDA cleared or approved. A negative result does not rule out the presence of PCR inhibitors in the specimen or target RNA in concentration below the limit of detection for the assay. If only one viral target is positive but coinfection with multiple targets is suspected, the sample should be re-tested with another FDA cleared, approved, or authorized test, if coinfection would change clinical management. This test was validated by the [facility]. These laboratories are certified under the Clinical Laboratory Improvement Amendments of 1988 (CLIA-88) as qualified to perform high complexity laboratory testing.   CBC with platelets and differential     Status: Abnormal   Result Value Ref Range    WBC Count 7.9 4.0 - 11.0 10e3/uL    RBC Count 4.25 3.80 - 5.20 10e6/uL    Hemoglobin 11.8 11.7 - 15.7 g/dL    Hematocrit 37.7 35.0 - 47.0 %    MCV 89 78 - 100 fL    MCH 27.8 26.5 - 33.0 pg    MCHC 31.3 (L) 31.5 - 36.5 g/dL    RDW 14.0 10.0 - 15.0 %    Platelet Count 238 150 - 450 10e3/uL    % Neutrophils 72 %    % Lymphocytes 20 %    % Monocytes 6 %    % Eosinophils 2 %    % Basophils 0 %    % Immature Granulocytes 0 %    NRBCs per 100 WBC 0 <1 /100    Absolute Neutrophils 5.7 1.6 - 8.3 10e3/uL    Absolute Lymphocytes 1.6 0.8 - 5.3 10e3/uL    Absolute Monocytes 0.5 0.0 - 1.3 10e3/uL    Absolute Eosinophils 0.2 0.0 - 0.7 10e3/uL    Absolute Basophils 0.0 0.0 - 0.2 10e3/uL    Absolute Immature Granulocytes 0.0 <=0.4 10e3/uL    Absolute NRBCs 0.0 10e3/uL   Troponin T, High Sensitivity     Status: Abnormal   Result Value Ref Range    Troponin T, High Sensitivity 17 (H) <=14 ng/L   EKG 12-lead, tracing only     Status: None   Result Value Ref Range    Systolic Blood Pressure  mmHg    Diastolic Blood Pressure  mmHg    Ventricular Rate 76 BPM    Atrial Rate 76 BPM    TX Interval 174 ms     QRS Duration 84 ms     ms    QTc 441 ms    P Axis 62 degrees    R AXIS -7 degrees    T Axis 8 degrees    Interpretation ECG Sinus rhythm  Normal ECG      CBC with platelets differential     Status: Abnormal    Narrative    The following orders were created for panel order CBC with platelets differential.  Procedure                               Abnormality         Status                     ---------                               -----------         ------                     CBC with platelets and d...[[id number]]  Abnormal            Final result                 Please view results for these tests on the individual orders.     Medications   potassium chloride (KAYCIEL) solution 60 mEq (60 mEq Oral Given 11/16/22 2222)   losartan (COZAAR) tablet 25 mg (25 mg Oral Given 11/16/22 2256)   predniSONE (DELTASONE) tablet 40 mg (40 mg Oral Given 11/17/22 0001)        Assessments & Plan (with Medical Decision Making)   81 year old female w/ PMH notable for HTN, pneumonia, COPD    Clinically, patient appears in no acute distress. Vital signs stable without hypoxia, tachycardia. Otherwise on examination, lungs are clear without wheezing, no murmurs or clicks on cardiac exam. She does reports some wheezing earlier in the day prior to using her albuterol.    Ddx includes, but not limited to, COVID infection, influenza, pneumonia, ACS, PE.     Work-up began in triage.  CBC was unremarkable, CMP  Showed minor hyper potassium 3.1.  Her influenza, COVID and RSV was negative.  Her initial troponin was 16, and with non-acute ECG ACS is less likely. Chest x-ray preliminary read shows a left basilar streaky opacities favoring atelectasis.    With her reassuring exam and presentation, and lack of leukocytosis suspect x-ray does show atelectasis vs infection.  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    She has been hypertensive throughout  her stay, but denies headache, blurry or changed vision. With her history and presentation and the fact she reports she has not been taking her prescribed Cozaar and metoprolol do not feel she needs emergent treatment of her blood pressure besides home dose of losartan.    She was given 60 mEq potassium, and her home dosing of losartan.    I did speak to her about her COVID and flu being negative, and her chest x-ray being negative for pneumonia.  With the current plan being to discharge following the results of her second troponin if that is nondiagnostic.  Also we will discharge her with a refill of her Cozaar since she has not been taking this, as well as her albuterol which she reports she does not have any refills of, as well as Tessalon Perles for cough. Recommended she follow up with her PCP to discuss her blood pressure as well.    Patient will be signed out to attending physician pending troponin results.    I have reviewed the nursing notes. I have reviewed the findings, diagnosis, plan and need for follow up with the patient.    Discharge Medication List as of 11/16/2022 11:58 PM      START taking these medications    Details   benzonatate (TESSALON) 100 MG capsule Take 1 capsule (100 mg) by mouth 3 times daily as needed for cough, Disp-30 capsule, R-0, Local Print      predniSONE (DELTASONE) 20 MG tablet Take 2 tablets (40 mg) by mouth daily for 3 days, Disp-6 tablet, R-0, Local Print             Final diagnoses:   Acute cough   Upper respiratory tract infection, unspecified type   Primary hypertension       --  JOSE DANIEL Valdivia CNP  [facility] [place] EMERGENCY DEPARTMENT  11/16/2022     Enmanuel Pendleton APRN CNP  11/17/22 1212

## 2022-11-17 NOTE — ED TRIAGE NOTES
Has been around several individuals with covid and has been SOB for the past couple days. Speaking in full sentences and appears to be comfortable

## 2022-11-30 ENCOUNTER — HOSPITAL ENCOUNTER (EMERGENCY)
Facility: CLINIC | Age: 82
Discharge: HOME | End: 2022-11-30
Attending: EMERGENCY MEDICINE | Admitting: EMERGENCY MEDICINE
Payer: COMMERCIAL

## 2022-11-30 VITALS
TEMPERATURE: 98.4 F | SYSTOLIC BLOOD PRESSURE: 172 MMHG | OXYGEN SATURATION: 96 % | HEIGHT: 64 IN | RESPIRATION RATE: 18 BRPM | WEIGHT: 220 LBS | DIASTOLIC BLOOD PRESSURE: 94 MMHG | HEART RATE: 85 BPM | BODY MASS INDEX: 37.56 KG/M2

## 2022-11-30 DIAGNOSIS — R42 LIGHTHEADEDNESS: ICD-10-CM

## 2022-11-30 LAB
ALBUMIN SERPL BCG-MCNC: 4 G/DL (ref 3.5–5.2)
ALBUMIN UR-MCNC: NEGATIVE MG/DL
ALP SERPL-CCNC: 106 U/L (ref 35–104)
ALT SERPL W P-5'-P-CCNC: 15 U/L (ref 10–35)
ANION GAP SERPL CALCULATED.3IONS-SCNC: 11 MMOL/L (ref 7–15)
APPEARANCE UR: ABNORMAL
AST SERPL W P-5'-P-CCNC: 17 U/L (ref 10–35)
BACTERIA #/AREA URNS HPF: ABNORMAL /HPF
BASOPHILS # BLD AUTO: 0 10E3/UL (ref 0–0.2)
BASOPHILS NFR BLD AUTO: 0 %
BILIRUB SERPL-MCNC: 0.3 MG/DL
BILIRUB UR QL STRIP: NEGATIVE
BUN SERPL-MCNC: 11.2 MG/DL (ref 8–23)
CALCIUM SERPL-MCNC: 9.3 MG/DL (ref 8.8–10.2)
CHLORIDE SERPL-SCNC: 104 MMOL/L (ref 98–107)
COLOR UR AUTO: ABNORMAL
CREAT SERPL-MCNC: 0.76 MG/DL (ref 0.51–0.95)
DEPRECATED HCO3 PLAS-SCNC: 28 MMOL/L (ref 22–29)
EOSINOPHIL # BLD AUTO: 0.1 10E3/UL (ref 0–0.7)
EOSINOPHIL NFR BLD AUTO: 2 %
ERYTHROCYTE [DISTWIDTH] IN BLOOD BY AUTOMATED COUNT: 13.9 % (ref 10–15)
GFR SERPL CREATININE-BSD FRML MDRD: 78 ML/MIN/1.73M2
GLUCOSE SERPL-MCNC: 120 MG/DL (ref 70–99)
GLUCOSE UR STRIP-MCNC: NEGATIVE MG/DL
HCT VFR BLD AUTO: 41.6 % (ref 35–47)
HGB BLD-MCNC: 12.8 G/DL (ref 11.7–15.7)
HGB UR QL STRIP: NEGATIVE
HOLD SPECIMEN: NORMAL
IMM GRANULOCYTES # BLD: 0.1 10E3/UL
IMM GRANULOCYTES NFR BLD: 1 %
KETONES UR STRIP-MCNC: NEGATIVE MG/DL
LEUKOCYTE ESTERASE UR QL STRIP: ABNORMAL
LYMPHOCYTES # BLD AUTO: 1.2 10E3/UL (ref 0.8–5.3)
LYMPHOCYTES NFR BLD AUTO: 18 %
MCH RBC QN AUTO: 27.5 PG (ref 26.5–33)
MCHC RBC AUTO-ENTMCNC: 30.8 G/DL (ref 31.5–36.5)
MCV RBC AUTO: 90 FL (ref 78–100)
MONOCYTES # BLD AUTO: 0.5 10E3/UL (ref 0–1.3)
MONOCYTES NFR BLD AUTO: 7 %
MUCOUS THREADS #/AREA URNS LPF: PRESENT /LPF
NEUTROPHILS # BLD AUTO: 4.8 10E3/UL (ref 1.6–8.3)
NEUTROPHILS NFR BLD AUTO: 72 %
NITRATE UR QL: POSITIVE
NRBC # BLD AUTO: 0 10E3/UL
NRBC BLD AUTO-RTO: 0 /100
PH UR STRIP: 7.5 [PH] (ref 5–7)
PLATELET # BLD AUTO: 260 10E3/UL (ref 150–450)
POTASSIUM SERPL-SCNC: 3.9 MMOL/L (ref 3.4–5.3)
PROT SERPL-MCNC: 6.7 G/DL (ref 6.4–8.3)
RBC # BLD AUTO: 4.65 10E6/UL (ref 3.8–5.2)
RBC URINE: 1 /HPF
SODIUM SERPL-SCNC: 143 MMOL/L (ref 136–145)
SP GR UR STRIP: 1.01 (ref 1–1.03)
SQUAMOUS EPITHELIAL: 1 /HPF
UROBILINOGEN UR STRIP-MCNC: NORMAL MG/DL
WBC # BLD AUTO: 6.7 10E3/UL (ref 4–11)
WBC CLUMPS #/AREA URNS HPF: PRESENT /HPF
WBC URINE: 133 /HPF

## 2022-11-30 PROCEDURE — 99283 EMERGENCY DEPT VISIT LOW MDM: CPT | Performed by: EMERGENCY MEDICINE

## 2022-11-30 PROCEDURE — 99282 EMERGENCY DEPT VISIT SF MDM: CPT | Performed by: EMERGENCY MEDICINE

## 2022-11-30 PROCEDURE — 36415 COLL VENOUS BLD VENIPUNCTURE: CPT | Performed by: EMERGENCY MEDICINE

## 2022-11-30 PROCEDURE — 81001 URINALYSIS AUTO W/SCOPE: CPT | Performed by: EMERGENCY MEDICINE

## 2022-11-30 PROCEDURE — 80053 COMPREHEN METABOLIC PANEL: CPT | Performed by: EMERGENCY MEDICINE

## 2022-11-30 PROCEDURE — 87186 SC STD MICRODIL/AGAR DIL: CPT | Performed by: EMERGENCY MEDICINE

## 2022-11-30 PROCEDURE — 85025 COMPLETE CBC W/AUTO DIFF WBC: CPT | Performed by: EMERGENCY MEDICINE

## 2022-11-30 ASSESSMENT — ACTIVITIES OF DAILY LIVING (ADL)
ADLS_ACUITY_SCORE: 33
ADLS_ACUITY_SCORE: 33

## 2022-11-30 NOTE — ED TRIAGE NOTES
Pt was seen in ED last Thursday and placed on Lorsartan for high blood pressure.  Pt returns today for concerns related to dizziness and her perception of altered mental status.  Last dose of Losartan yesterday.  Pt alert and oriented x4 in triage.

## 2022-12-01 NOTE — ED PROVIDER NOTES
ED Provider Note  [facility]      History     Chief Complaint   Patient presents with     Dizziness     HPI  Jessica Ramsey is a 81 year old female who presents with dizziness.  The patient states that she has been feeling dizzy, worse upon standing and feels like she has not her self for the past several days.  She states this started after she was started on medication for blood pressure.  She states prior to that she had not been on any medications for blood pressure.  She states she did not take her dose today and feels much better than she has for the past several days.  No previous similar occurrences in the past.  No recent illness.  No other symptoms noted.    Past Medical History  Past Medical History:   Diagnosis Date     Arthritis      Asthma      Degenerative joint disease      Depression      Hoarseness      Hypertension      Indigestion      Nasal congestion      Night sweats      Numbness      Pneumonia      Ringing in ears      Sleep problems      Snoring      Sore throat      Trouble swallowing      Unspecified cerebral artery occlusion with cerebral infarction      Weakness      Weight gain      Past Surgical History:   Procedure Laterality Date     BACK SURGERY       HYSTERECTOMY       ORTHOPEDIC SURGERY       SD SPINE SURGERY PROCEDURE UNLISTED       ZZC STOMACH SURGERY PROCEDURE UNLISTED       acetaminophen (TYLENOL) 650 MG CR tablet  albuterol (2.5 MG/3ML) 0.083% nebulizer solution  albuterol (ACCUNEB) 0.63 MG/3ML nebulizer solution  albuterol (PROAIR HFA/PROVENTIL HFA/VENTOLIN HFA) 108 (90 Base) MCG/ACT inhaler  aspirin 325 MG tablet  atorvastatin (LIPITOR) 40 MG tablet  benzonatate (TESSALON) 100 MG capsule  CALCIUM & MAGNESIUM CARBONATES PO  Cholecalciferol (VITAMIN D) 2000 units tablet  COENZYME Q-10 PO  Escitalopram Oxalate (LEXAPRO PO)  furosemide (LASIX) 20 MG tablet  GABAPENTIN PO  IBUPROFEN PO  losartan (COZAAR) 25 MG tablet  metoprolol succinate ER  "(TOPROL-XL) 25 MG 24 hr tablet  Multiple Vitamins-Minerals (PRESERVISION AREDS PO)  naproxen (NAPROSYN) 500 MG tablet  omeprazole (PRILOSEC) 20 MG capsule  ORDER FOR DME  phenazopyridine (PYRIDIUM) 200 MG tablet  pramipexole (MIRAPEX) 0.5 MG tablet  Sertraline HCl (ZOLOFT PO)  simvastatin (ZOCOR) 40 MG tablet  Tiotropium Bromide Monohydrate (SPIRIVA HANDIHALER IN)  traZODone (DESYREL) 100 MG tablet  triamcinolone (KENALOG) 0.1 % external cream  vitamin D (ERGOCALCIFEROL) 65238 UNIT capsule      Allergies   Allergen Reactions     Lyrica [Pregabalin]      \"felt like I was high and wanted to sleep a lot and did not help with the pain.\"     Niacin      Rapid heartbeat     Sulfa Drugs      Unknown     Tramadol      Dizziness      Naproxen Rash     Patient reported     Family History  Family History   Problem Relation Age of Onset     Asthma Father      Cerebrovascular Disease Father      Obesity Father      Low Back Problems Other      Social History   Social History     Tobacco Use     Smoking status: Former     Packs/day: 1.00     Years: 18.00     Pack years: 18.00     Types: Cigarettes     Smokeless tobacco: Never   Substance Use Topics     Alcohol use: No     Drug use: No      Past medical history, past surgical history, medications, allergies, family history, and social history were reviewed with the patient. No additional pertinent items.       Review of Systems  A complete review of systems was performed with pertinent positives and negatives noted in the HPI, and all other systems negative.    Physical Exam   BP: 134/80  Pulse: 90  Temp: 98.4  F (36.9  C)  Resp: 16  Height: 162.6 cm (5' 4\")  Weight: 99.8 kg (220 lb)  SpO2: 96 %  Physical Exam  Vitals and nursing note reviewed.   Constitutional:       General: She is not in acute distress.     Appearance: She is well-developed and well-nourished. She is not diaphoretic.   HENT:      Head: Normocephalic and atraumatic.      Mouth/Throat:      Pharynx: No " oropharyngeal exudate.   Eyes:      General: No scleral icterus.        Right eye: No discharge.         Left eye: No discharge.      Pupils: Pupils are equal, round, and reactive to light.   Cardiovascular:      Rate and Rhythm: Normal rate and regular rhythm.      Pulses: Intact distal pulses.      Heart sounds: Normal heart sounds. No murmur heard.    No friction rub. No gallop.   Pulmonary:      Effort: Pulmonary effort is normal. No respiratory distress.      Breath sounds: Normal breath sounds. No wheezing.   Chest:      Chest wall: No tenderness.   Abdominal:      General: Bowel sounds are normal. There is no distension.      Palpations: Abdomen is soft.      Tenderness: There is no abdominal tenderness.   Musculoskeletal:         General: No tenderness, deformity or edema. Normal range of motion.      Cervical back: Normal range of motion and neck supple.   Skin:     General: Skin is warm and dry.      Coloration: Skin is not pale.      Findings: No erythema or rash.   Neurological:      Mental Status: She is alert and oriented to person, place, and time.      Cranial Nerves: No cranial nerve deficit.   Psychiatric:         Mood and Affect: Mood and affect normal.         ED Course      Procedures                     Results for orders placed or performed during the hospital encounter of 11/30/22   [place] Draw     Status: None    Narrative    The following orders were created for panel order [place] Draw.  Procedure                               Abnormality         Status                     ---------                               -----------         ------                     Extra Blue Top Tube[[id number]]                              Final result               Extra Red Top Tube[[id number]]                               Final result               Extra Green Top (Lithium...[[id number]]                      Final result               Extra Purple Top Tube[[id number]]                            Final result                  Please view results for these tests on the individual orders.   Extra Blue Top Tube     Status: None   Result Value Ref Range    Hold Specimen JIC    Extra Red Top Tube     Status: None   Result Value Ref Range    Hold Specimen JIC    Extra Green Top (Lithium Heparin) Tube     Status: None   Result Value Ref Range    Hold Specimen JIC    Extra Purple Top Tube     Status: None   Result Value Ref Range    Hold Specimen JIC    Comprehensive metabolic panel     Status: Abnormal   Result Value Ref Range    Sodium 143 136 - 145 mmol/L    Potassium 3.9 3.4 - 5.3 mmol/L    Chloride 104 98 - 107 mmol/L    Carbon Dioxide (CO2) 28 22 - 29 mmol/L    Anion Gap 11 7 - 15 mmol/L    Urea Nitrogen 11.2 8.0 - 23.0 mg/dL    Creatinine 0.76 0.51 - 0.95 mg/dL    Calcium 9.3 8.8 - 10.2 mg/dL    Glucose 120 (H) 70 - 99 mg/dL    Alkaline Phosphatase 106 (H) 35 - 104 U/L    AST 17 10 - 35 U/L    ALT 15 10 - 35 U/L    Protein Total 6.7 6.4 - 8.3 g/dL    Albumin 4.0 3.5 - 5.2 g/dL    Bilirubin Total 0.3 <=1.2 mg/dL    GFR Estimate 78 >60 mL/min/1.73m2   UA with Microscopic reflex to Culture     Status: Abnormal    Specimen: Urine, Clean Catch   Result Value Ref Range    Color Urine Light Yellow Colorless, Straw, Light Yellow, Yellow    Appearance Urine Slightly Cloudy (A) Clear    Glucose Urine Negative Negative mg/dL    Bilirubin Urine Negative Negative    Ketones Urine Negative Negative mg/dL    Specific Gravity Urine 1.013 1.003 - 1.035    Blood Urine Negative Negative    pH Urine 7.5 (H) 5.0 - 7.0    Protein Albumin Urine Negative Negative mg/dL    Urobilinogen Urine Normal Normal, 2.0 mg/dL    Nitrite Urine Positive (A) Negative    Leukocyte Esterase Urine Large (A) Negative    Bacteria Urine Few (A) None Seen /HPF    WBC Clumps Urine Present (A) None Seen /HPF    Mucus Urine Present (A) None Seen /LPF    RBC Urine 1 <=2 /HPF    WBC Urine 133 (H) <=5 /HPF    Squamous Epithelials Urine 1 <=1 /HPF    Narrative    Urine Culture  ordered based on laboratory criteria   CBC with platelets and differential     Status: Abnormal   Result Value Ref Range    WBC Count 6.7 4.0 - 11.0 10e3/uL    RBC Count 4.65 3.80 - 5.20 10e6/uL    Hemoglobin 12.8 11.7 - 15.7 g/dL    Hematocrit 41.6 35.0 - 47.0 %    MCV 90 78 - 100 fL    MCH 27.5 26.5 - 33.0 pg    MCHC 30.8 (L) 31.5 - 36.5 g/dL    RDW 13.9 10.0 - 15.0 %    Platelet Count 260 150 - 450 10e3/uL    % Neutrophils 72 %    % Lymphocytes 18 %    % Monocytes 7 %    % Eosinophils 2 %    % Basophils 0 %    % Immature Granulocytes 1 %    NRBCs per 100 WBC 0 <1 /100    Absolute Neutrophils 4.8 1.6 - 8.3 10e3/uL    Absolute Lymphocytes 1.2 0.8 - 5.3 10e3/uL    Absolute Monocytes 0.5 0.0 - 1.3 10e3/uL    Absolute Eosinophils 0.1 0.0 - 0.7 10e3/uL    Absolute Basophils 0.0 0.0 - 0.2 10e3/uL    Absolute Immature Granulocytes 0.1 <=0.4 10e3/uL    Absolute NRBCs 0.0 10e3/uL   CBC with platelets differential     Status: Abnormal    Narrative    The following orders were created for panel order CBC with platelets differential.  Procedure                               Abnormality         Status                     ---------                               -----------         ------                     CBC with platelets and d...[[id number]]  Abnormal            Final result                 Please view results for these tests on the individual orders.     Medications - No data to display     Assessments & Plan (with Medical Decision Making)   This is an 81-year-old female who presents with lightheadedness and not feeling her self.  This started after being started on an antihypertensive on 11/24/2022.  She had not been on any previous antihypertensive.  Patient states she did not take it today and now she feels improved.  It appears the patient's symptoms are likely due to this new antihypertensive and discussed that we would likely need to change agents with her but we will do lab and imaging to make sure we are not  missing any other etiologies exam demonstrates no acute abnormalities.  There is no focal neurodeficits.  Lab work shows likely UTI.  Head CT was ordered and is pending at this time.  Prior to completion of work-up patient left AMA from the waiting room.    I have reviewed the nursing notes. I have reviewed the findings, diagnosis, plan and need for follow up with the patient.    Discharge Medication List as of 11/30/2022  4:38 PM          Final diagnoses:   Lightheadedness       --  Khari Nichole DO  [facility] EMERGENCY DEPARTMENT  11/30/2022     Khari Nichole,   11/30/22 1853

## 2022-12-02 ENCOUNTER — TELEPHONE (OUTPATIENT)
Dept: EMERGENCY MEDICINE | Facility: CLINIC | Age: 82
End: 2022-12-02

## 2022-12-02 LAB
BACTERIA UR CULT: ABNORMAL
BACTERIA UR CULT: ABNORMAL

## 2022-12-02 RX ORDER — CEFPODOXIME PROXETIL 100 MG/1
100 TABLET, FILM COATED ORAL 2 TIMES DAILY
Qty: 10 TABLET | Refills: 0 | Status: SHIPPED | OUTPATIENT
Start: 2022-12-02 | End: 2022-12-07

## 2022-12-02 NOTE — TELEPHONE ENCOUNTER
[facility] Emergency Department[facility]Urgent Care Lab result notification     Patient/parent Name  Jessica    RN Assessment (Patient s current Symptoms), include time called.  [Insert Left message here if message left]  12:32p  Relayed preliminary result to patient  She states she feels some urgency or frequency with urination and thought she may have a UTI.  She is aware that she will be contacted upon final report only if a treatment change is needed.  Patient did leave AMA and did ask other questions regarding if anything showed dehydration. Did state to patient this RN can relay culture results and patient stated understanding and will contact her care team.  Lab result (if applicable):  Preliminary urine culture report on 12/2/22 shows the presence of bacteria(s):  >100,000 CFU/mL Gram negative bacilli and >100,000 CFU/mL Gram negative bacilli   Emergency Dept/Urgent Care discharge antibiotic: None, patient left AMA  Recommendations per [facility] ED Lab result Urine culture protocol.     RN Recommendations/Instructions per [place] ED lab result protocol  Patient notified of lab result and treatment recommendations.  Rx for Cefpodoxime (Vantin) 100 MG tablet, 1 tablet (100 mg) by mouth 2 times daily for 5 days sent to [Pharmacy - [facility]].     Please Contact your PCP clinic or return to the Emergency department if your:    Symptoms return.    Symptoms do not improve after 3 days on antibiotic.    Symptoms do not resolve after completing antibiotic.    Symptoms worsen or other concerning symptom's.        Liza Nunez, VANNA  [facility]  Emergency Dept Lab Result RN  Ph# [phone]     Copy of Lab result

## 2022-12-02 NOTE — TELEPHONE ENCOUNTER
"[facility] Emergency Department Lab result notification    [place] ED lab result protocol used  Urine Culture    Reason for call  Notify of lab results, assess symptoms,  review ED providers recommendations/discharge instructions (if necessary) and advise per ED lab result f/u protocol    Lab Result (including Rx patient on, if applicable)  Preliminary urine culture report on 12/2/22 shows the presence of bacteria(s):  >100,000 CFU/mL Gram negative bacilli and >100,000 CFU/mL Gram negative bacilli   Emergency Dept/Urgent Care discharge antibiotic: None, patient left AMA  Recommendations per [facility] ED Lab result Urine culture protocol.    Information table from Emergency Dept Provider visit on 11/30/22  Symptoms reported at ED visit (Chief complaint, HPI)  Dizziness      HPI  Jessica Ramsey is a 81 year old female who presents with dizziness.  The patient states that she has been feeling dizzy, worse upon standing and feels like she has not her self for the past several days.  She states this started after she was started on medication for blood pressure.  She states prior to that she had not been on any medications for blood pressure.  She states she did not take her dose today and feels much better than she has for the past several days.  No previous similar occurrences in the past.  No recent illness.  No other symptoms noted.     Significant Medical hx, if applicable (i.e. CKD, diabetes) Reviewed   Allergies Allergies   Allergen Reactions     Lyrica [Pregabalin]      \"felt like I was high and wanted to sleep a lot and did not help with the pain.\"     Niacin      Rapid heartbeat     Sulfa Drugs      Unknown     Tramadol      Dizziness      Naproxen Rash     Patient reported      Weight, if applicable Wt Readings from Last 2 Encounters:   11/30/22 99.8 kg (220 lb)   11/16/22 99.8 kg (220 lb)      Coumadin/Warfarin [Yes /No] No   Creatinine Level (mg/dl) Creatinine   Date Value Ref Range Status "   11/30/2022 0.76 0.51 - 0.95 mg/dL Final   06/12/2012 0.90 0.52 - 1.04 mg/dL Final      Creatinine clearance (ml/min), if applicable Serum creatinine: 0.76 mg/dL 11/30/22 1158  Estimated creatinine clearance: 65.5 mL/min   Pregnant (Yes/No/NA) NA   Breastfeeding (Yes/No/NA) NA   ED providers Impression and Plan (applicable information) This is an 81-year-old female who presents with lightheadedness and not feeling her self.  This started after being started on an antihypertensive on 11/24/2022.  She had not been on any previous antihypertensive.  Patient states she did not take it today and now she feels improved.  It appears the patient's symptoms are likely due to this new antihypertensive and discussed that we would likely need to change agents with her but we will do lab and imaging to make sure we are not missing any other etiologies exam demonstrates no acute abnormalities.  There is no focal neurodeficits.  Lab work shows likely UTI.  Head CT was ordered and is pending at this time.  Prior to completion of work-up patient left AMA from the waiting room.   ED diagnosis Lightheadedness   ED provider Khari Nichole DO        RN Assessment (Patient s current Symptoms), include time called.  [Insert Left message here if message left]  10:39a  Left voicemail message requesting a call back to [facility] ED Lab Result RN at [phone].  RN is available every day between 9 a.m. and 5:30 p.m.  See Telephone encounter.      Liza Nunez RN  [facility] [employer] [place]  Emergency Dept Lab Result RN  Ph# [phone]     Copy of Lab result

## 2022-12-08 NOTE — TELEPHONE ENCOUNTER
[facility] Emergency Department Lab result notification     Patient/parent Name  Jessica    Reason for call  Patient requesting lab result    Lab Result  Final Urine Culture Report on 12/2/22  [facility] Emergency Dept discharge antibiotic prescribed:  Cefpodoxime (Vantin) 100 MG tablet, 1 tablet (100 mg) by mouth 2 times daily for 5 days  #1. Bacteria, >100,000 CFU/mL Escherichia coli, is SUSCEPTIBLE to Antibiotic.    #2. Bacteria, >100,000 CFU/mL Escherichia coli, is SUSCEPTIBLE to Antibiotic.  No change in treatment per [facility] ED lab result Urine Culture protocol.  Current symptoms  12:03PM: Patient calling in for result. States she is doing better, is waiting for a call from her clinic today.   Recommendations/Instructions  Result reviewed with patient who has no further questions and is in a hurry to get off the phone, waiting for a call from her clinic.     Contact your PCP clinic or return to the Emergency department if your:    Symptoms return.    Symptoms worsen or other concerning symptom's.    PCP follow-up Questions asked: YES       Odalys Gonsales RN  [facility]  Emergency Dept Lab Result RN  # [phone]     Copy of Lab result   Urine Culture  Order: [id number]   Collected 11/30/2022  1:55 PM      Status: Final result      Visible to patient: Yes (not seen)     Specimen Information: Urine, Clean Catch    5 Result Notes  Culture >100,000 CFU/mL Escherichia coli Abnormal        >100,000 CFU/mL Escherichia coli Abnormal             Resulting Agency: [facility]     Susceptibility     Escherichia coli (1) Escherichia coli (2)     DILSHAD DILSHAD     Ampicillin >=32 ug/mL Resistant >=32 ug/mL Resistant     Ampicillin/ Sulbactam >=32 ug/mL Resistant >=32 ug/mL Resistant     Cefazolin <=4 ug/mL Susceptible 1 <=4 ug/mL Susceptible 1     Cefepime <=1 ug/mL Susceptible <=1 ug/mL Susceptible     Cefoxitin <=4 ug/mL Susceptible <=4 ug/mL Susceptible     Ceftazidime <=1 ug/mL  Susceptible <=1 ug/mL Susceptible     Ceftriaxone <=1 ug/mL Susceptible <=1 ug/mL Susceptible     Ciprofloxacin <=0.25 ug/mL Susceptible <=0.25 ug/mL Susceptible     Gentamicin <=1 ug/mL Susceptible <=1 ug/mL Susceptible     Levofloxacin <=0.12 ug/mL Susceptible <=0.12 ug/mL Susceptible     Nitrofurantoin <=16 ug/mL Susceptible <=16 ug/mL Susceptible     Piperacillin/Tazobactam <=4 ug/mL Susceptible <=4 ug/mL Susceptible     Tobramycin <=1 ug/mL Susceptible <=1 ug/mL Susceptible     Trimethoprim/Sulfamethoxazole <=1/19 ug/mL Susceptible <=1/19 ug/mL Susceptible                1 Cefazolin DILSHAD breakpoints are for the treatment of uncomplicated urinary tract infections. For the treatment of systemic infections, please contact the laboratory for additional testing.            Specimen Collected: 11/30/22  1:55 PM Last Resulted: 12/02/22  8:59 PM

## 2022-12-23 ENCOUNTER — OFFICE VISIT (OUTPATIENT)
Dept: ORTHOPEDICS | Facility: CLINIC | Age: 82
End: 2022-12-23
Payer: COMMERCIAL

## 2022-12-23 ENCOUNTER — PRE VISIT (OUTPATIENT)
Dept: ORTHOPEDICS | Facility: CLINIC | Age: 82
End: 2022-12-23

## 2022-12-23 VITALS — HEIGHT: 64 IN | WEIGHT: 220 LBS | BODY MASS INDEX: 37.56 KG/M2

## 2022-12-23 DIAGNOSIS — M19.011 OSTEOARTHRITIS OF RIGHT GLENOHUMERAL JOINT: Primary | ICD-10-CM

## 2022-12-23 DIAGNOSIS — E66.01 MORBID OBESITY (H): ICD-10-CM

## 2022-12-23 PROCEDURE — 99203 OFFICE O/P NEW LOW 30 MIN: CPT | Mod: GC | Performed by: FAMILY MEDICINE

## 2022-12-23 NOTE — LETTER
12/23/2022      RE: Jessica Ramsey  [address]  [place] [id number]     Dear Colleague,    Thank you for referring your patient, Jessica Ramsey, to the [facility] [place]. Please see a copy of my visit note below.    Background:   Date of injury: NA  Duration of symptoms: A few years  Mechanism of Injury: No injury  Intensity: 5/10  Aggravating factors: Overhead motion, lifting  Relieving Factors: Heat  Prior Evaluation: [place] [facility] 12/16/22         [facility]  Sports Medicine Clinic  [facility]              SUBJECTIVE       Jessica Ramsey is a 82 year old female presenting to clinic today with a chief complaint of right shoulder.    Date of injury: NA  Duration of symptoms: A few years  Mechanism of Injury: No injury  Intensity: 5/10, stabbing and sharp. Pain is anterior.  Aggravating factors: Overhead motion, lifting  Relieving Factors: Heat  Prior Evaluation: [place] [facility] [facility] 12/16/22    Can open jars and use hands well, but will drop a mug.    Radiograph on 12/16/22 at OSH:  FINDINGS: No fracture or dislocation identified. Advanced degenerative arthrosis of the glenohumeral joint with bone-on-bone joint space narrowing and marginal osteophytes. Subchondral sclerosis and cystic change, most prominent within the superomedial aspect of the humeral head. Findings felt likely on degenerative basis but would be difficult to absolutely exclude component of avascular necrosis of the humeral head. No humeral head collapse identified. Slight degenerative change at the acromioclavicular joint.    PMH, Medications and Allergies were reviewed and updated as needed.    ROS:  As noted above otherwise negative.    Patient Active Problem List   Diagnosis     COPD (chronic obstructive pulmonary disease) (H)     Parotitis     Dysuria     Cellulitis and abscess of leg, except foot     Vitamin D deficiency disease     Lumbago     Bilateral primary  osteoarthritis of knee     Morbid obesity (H)       Current Outpatient Medications   Medication Sig Dispense Refill     acetaminophen (TYLENOL) 650 MG CR tablet Take 650 mg by mouth 4 times daily as needed.       albuterol (2.5 MG/3ML) 0.083% nebulizer solution Take 3 mLs by nebulization every 6 hours as needed for shortness of breath / dyspnea for 30 doses. 30 vial 2     albuterol (ACCUNEB) 0.63 MG/3ML nebulizer solution Take 3 mLs by nebulization every 6 hours as needed for shortness of breath / dyspnea. Use 1 unit dose in nebulizer every 4-6 hours as needed 1 Box 5     albuterol (PROAIR HFA/PROVENTIL HFA/VENTOLIN HFA) 108 (90 Base) MCG/ACT inhaler Inhale 2 puffs into the lungs every 6 hours as needed for shortness of breath / dyspnea or wheezing 6.7 g 0     aspirin 325 MG tablet Take 325 mg by mouth daily.       atorvastatin (LIPITOR) 40 MG tablet Take 40 mg by mouth       benzonatate (TESSALON) 100 MG capsule Take 1 capsule (100 mg) by mouth 3 times daily as needed for cough 30 capsule 0     CALCIUM & MAGNESIUM CARBONATES PO        Cholecalciferol (VITAMIN D) 2000 units tablet Take 1 tablet by mouth       COENZYME Q-10 PO        Escitalopram Oxalate (LEXAPRO PO) Take 20 mg by mouth daily       furosemide (LASIX) 20 MG tablet Take 25 mg by mouth daily       GABAPENTIN PO        metoprolol succinate ER (TOPROL-XL) 25 MG 24 hr tablet TK 1 T PO  ONCE D  0     Multiple Vitamins-Minerals (PRESERVISION AREDS PO) Take 1 tablet by mouth daily       omeprazole (PRILOSEC) 20 MG capsule Take 1 capsule by mouth 2 times daily. 180 capsule 3     ORDER FOR DME Equipment being ordered: Nebulizer 1 each 0     phenazopyridine (PYRIDIUM) 200 MG tablet TK 1 T PO TID FOR 2 DAYS  0     pramipexole (MIRAPEX) 0.5 MG tablet Take 2 mg by mouth At Bedtime       Sertraline HCl (ZOLOFT PO) Take 150 mg by mouth daily        simvastatin (ZOCOR) 40 MG tablet Take 1 tablet by mouth At Bedtime. 90 tablet 3     Tiotropium Bromide Monohydrate  "(SPIRIVA HANDIHALER IN) Once daily inhale into lungs       traZODone (DESYREL) 100 MG tablet Take 1 tablet by mouth nightly as needed for sleep. As directed 30 tablet 5     triamcinolone (KENALOG) 0.1 % external cream Apply topically 2 times daily 80 g 0     vitamin D (ERGOCALCIFEROL) 97027 UNIT capsule Take 1 capsule (50,000 Units) by mouth once a week 12 capsule 1     losartan (COZAAR) 25 MG tablet Take 1 tablet (25 mg) by mouth daily for 30 days 30 tablet 0            OBJECTIVE:       Vitals:   Vitals:    12/23/22 0914   Weight: 99.8 kg (220 lb)   Height: 1.626 m (5' 4\")     BMI: Body mass index is 37.76 kg/m .    Gen:  Well nourished and in no acute distress  HEENT: Extraocular movement intact  Neck: Supple  Pulm:  Breathing Comfortably. No increased respiratory effort.  Psych: Euthymic. Appropriately answers questions    MSK: Patient is seated in wheel chair  SHOULDER: right  Musculoskeletal - right shoulder  - inspection: normal bone and joint alignment, no obvious deformity, no scapular winging, no AC step-off  - palpation: + bony tenderness over clavicle and ac joint, + soft tissue tenderness over biceps tendon, no TTP over posterior structures  - ROM:  150 deg flexion   Unable to eval extension due to seated in wheel chair   130 deg abduction, painful   60 deg ER   Unable to assess IR  - strength: 4/5 in all shoulder planes, not painful  - special tests:  (-) Speed's  (+) Neer  (+) Hawkin's  (+) cross arm adduction  (-) Celeste  (-) Clay's  (-) apprehension  (-) subscap lift-off  Neuro  - no sensory or motor deficit, grossly normal coordination, normal muscle tone  Skin  - no ecchymosis, erythema, warmth, or induration, no obvious rash             ASSESSMENT and PLAN:     Jessica was seen today for consult.    Diagnoses and all orders for this visit:    Osteoarthritis of right glenohumeral joint      Jessica is an 81 yo F with osteoarthritis of knees bilaterally and left shoulder presenting with chronic right " shoulder pain, found to have advanced severe bone on bone osteoarthritis of glenohumeral joint on outside radiograph. Recent use of prednisone for asthma exacerbation with no noticeable relief of shoulder pain. Takes daily aspirin and with allergy to naproxen. Patient prefers starting physical therapy rather than additional medication. Will schedule for GH joint injection in 4 weeks to help with completion of PT.    - start PT  -     Return sooner if develops new or worsening symptoms.    Options for treatment and/or follow-up care were reviewed with the patient was actively involved in the decision making process. Patient verbalized understanding and was in agreement with the plan.    The patient was seen by and discussed with attending physician Dr.Suzanne JOHN Fernandes MD, CAQ, CCD, who agrees with the plan as stated unless otherwise stated.     Jean Paul Mitchell DO  Primary Care Sports Medicine Fellow      Attending Note:   I haveexamined this patient /images and have reviewed the clinical presentation and progress note with the fellow. I agree with the treatment plan as outlined. The plan was formulated with the fellow on the day of the patient's visit.    Jill Fernandes MD, CAQ, CCD  [facility]  Sports Medicine and Bone Health      Again, thank you for allowing me to participate in the care of your patient.      Sincerely,    Jill Fernandes MD

## 2022-12-23 NOTE — PROGRESS NOTES
Background:   Date of injury: NA  Duration of symptoms: A few years  Mechanism of Injury: No injury  Intensity: 5/10  Aggravating factors: Overhead motion, lifting  Relieving Factors: Heat  Prior Evaluation: [facility] 12/16/22

## 2022-12-23 NOTE — LETTER
Date:December 26, 2022      Provider requested that no letter be sent. Do not send.       [facility]

## 2022-12-23 NOTE — PROGRESS NOTES
[facility]  Sports Medicine Clinic  Clinics and Surgery Center              SUBJECTIVE       Jessica Ramsey is a 82 year old female presenting to clinic today with a chief complaint of right shoulder.    Date of injury: NA  Duration of symptoms: A few years  Mechanism of Injury: No injury  Intensity: 5/10, stabbing and sharp. Pain is anterior.  Aggravating factors: Overhead motion, lifting  Relieving Factors: Heat  Prior Evaluation: [facility] XR 12/16/22    Can open jars and use hands well, but will drop a mug.    Radiograph on 12/16/22 at OSH:  FINDINGS: No fracture or dislocation identified. Advanced degenerative arthrosis of the glenohumeral joint with bone-on-bone joint space narrowing and marginal osteophytes. Subchondral sclerosis and cystic change, most prominent within the superomedial aspect of the humeral head. Findings felt likely on degenerative basis but would be difficult to absolutely exclude component of avascular necrosis of the humeral head. No humeral head collapse identified. Slight degenerative change at the acromioclavicular joint.    PMH, Medications and Allergies were reviewed and updated as needed.    ROS:  As noted above otherwise negative.    Patient Active Problem List   Diagnosis     COPD (chronic obstructive pulmonary disease) (H)     Parotitis     Dysuria     Cellulitis and abscess of leg, except foot     Vitamin D deficiency disease     Lumbago     Bilateral primary osteoarthritis of knee     Morbid obesity (H)       Current Outpatient Medications   Medication Sig Dispense Refill     acetaminophen (TYLENOL) 650 MG CR tablet Take 650 mg by mouth 4 times daily as needed.       albuterol (2.5 MG/3ML) 0.083% nebulizer solution Take 3 mLs by nebulization every 6 hours as needed for shortness of breath / dyspnea for 30 doses. 30 vial 2     albuterol (ACCUNEB) 0.63 MG/3ML nebulizer solution Take 3 mLs by nebulization every 6 hours as needed for shortness of breath / dyspnea.  Use 1 unit dose in nebulizer every 4-6 hours as needed 1 Box 5     albuterol (PROAIR HFA/PROVENTIL HFA/VENTOLIN HFA) 108 (90 Base) MCG/ACT inhaler Inhale 2 puffs into the lungs every 6 hours as needed for shortness of breath / dyspnea or wheezing 6.7 g 0     aspirin 325 MG tablet Take 325 mg by mouth daily.       atorvastatin (LIPITOR) 40 MG tablet Take 40 mg by mouth       benzonatate (TESSALON) 100 MG capsule Take 1 capsule (100 mg) by mouth 3 times daily as needed for cough 30 capsule 0     CALCIUM & MAGNESIUM CARBONATES PO        Cholecalciferol (VITAMIN D) 2000 units tablet Take 1 tablet by mouth       COENZYME Q-10 PO        Escitalopram Oxalate (LEXAPRO PO) Take 20 mg by mouth daily       furosemide (LASIX) 20 MG tablet Take 25 mg by mouth daily       GABAPENTIN PO        metoprolol succinate ER (TOPROL-XL) 25 MG 24 hr tablet TK 1 T PO  ONCE D  0     Multiple Vitamins-Minerals (PRESERVISION AREDS PO) Take 1 tablet by mouth daily       omeprazole (PRILOSEC) 20 MG capsule Take 1 capsule by mouth 2 times daily. 180 capsule 3     ORDER FOR DME Equipment being ordered: Nebulizer 1 each 0     phenazopyridine (PYRIDIUM) 200 MG tablet TK 1 T PO TID FOR 2 DAYS  0     pramipexole (MIRAPEX) 0.5 MG tablet Take 2 mg by mouth At Bedtime       Sertraline HCl (ZOLOFT PO) Take 150 mg by mouth daily        simvastatin (ZOCOR) 40 MG tablet Take 1 tablet by mouth At Bedtime. 90 tablet 3     Tiotropium Bromide Monohydrate (SPIRIVA HANDIHALER IN) Once daily inhale into lungs       traZODone (DESYREL) 100 MG tablet Take 1 tablet by mouth nightly as needed for sleep. As directed 30 tablet 5     triamcinolone (KENALOG) 0.1 % external cream Apply topically 2 times daily 80 g 0     vitamin D (ERGOCALCIFEROL) 00446 UNIT capsule Take 1 capsule (50,000 Units) by mouth once a week 12 capsule 1     losartan (COZAAR) 25 MG tablet Take 1 tablet (25 mg) by mouth daily for 30 days 30 tablet 0            OBJECTIVE:       Vitals:   Vitals:     "12/23/22 0914   Weight: 99.8 kg (220 lb)   Height: 1.626 m (5' 4\")     BMI: Body mass index is 37.76 kg/m .    Gen:  Well nourished and in no acute distress  HEENT: Extraocular movement intact  Neck: Supple  Pulm:  Breathing Comfortably. No increased respiratory effort.  Psych: Euthymic. Appropriately answers questions    MSK: Patient is seated in wheel chair  SHOULDER: right  Musculoskeletal - right shoulder  - inspection: normal bone and joint alignment, no obvious deformity, no scapular winging, no AC step-off  - palpation: + bony tenderness over clavicle and ac joint, + soft tissue tenderness over biceps tendon, no TTP over posterior structures  - ROM:  150 deg flexion   Unable to eval extension due to seated in wheel chair   130 deg abduction, painful   60 deg ER   Unable to assess IR  - strength: 4/5 in all shoulder planes, not painful  - special tests:  (-) Speed's  (+) Neer  (+) Hawkin's  (+) cross arm adduction  (-) Celeste  (-) Charlotte's  (-) apprehension  (-) subscap lift-off  Neuro  - no sensory or motor deficit, grossly normal coordination, normal muscle tone  Skin  - no ecchymosis, erythema, warmth, or induration, no obvious rash             ASSESSMENT and PLAN:     Jessica was seen today for consult.    Diagnoses and all orders for this visit:    Osteoarthritis of right glenohumeral joint      Jessica is an 81 yo F with osteoarthritis of knees bilaterally and left shoulder presenting with chronic right shoulder pain, found to have advanced severe bone on bone osteoarthritis of glenohumeral joint on outside radiograph. Recent use of prednisone for asthma exacerbation with no noticeable relief of shoulder pain. Takes daily aspirin and with allergy to naproxen. Patient prefers starting physical therapy rather than additional medication. Will schedule for GH joint injection in 4 weeks to help with completion of PT.    - start PT  -     Return sooner if develops new or worsening symptoms.    Options for treatment " and/or follow-up care were reviewed with the patient was actively involved in the decision making process. Patient verbalized understanding and was in agreement with the plan.    The patient was seen by and discussed with attending physician Dr.Suzanne JOHN Fernandes MD, CAQ, CCD, who agrees with the plan as stated unless otherwise stated.     Jean Paul Mitchell DO  Primary Care Sports Medicine Fellow

## 2022-12-23 NOTE — PROGRESS NOTES
Attending Note:   I haveexamined this patient /images and have reviewed the clinical presentation and progress note with the fellow. I agree with the treatment plan as outlined. The plan was formulated with the fellow on the day of the patient's visit.    Jill Fernandes MD, CAQ, CCD  [facility]  Sports Medicine and Bone Health

## 2023-03-20 ENCOUNTER — APPOINTMENT (OUTPATIENT)
Dept: CT IMAGING | Facility: CLINIC | Age: 83
End: 2023-03-20
Attending: EMERGENCY MEDICINE
Payer: COMMERCIAL

## 2023-03-20 ENCOUNTER — HOSPITAL ENCOUNTER (EMERGENCY)
Facility: CLINIC | Age: 83
Discharge: HOME | End: 2023-03-21
Attending: EMERGENCY MEDICINE | Admitting: EMERGENCY MEDICINE
Payer: COMMERCIAL

## 2023-03-20 DIAGNOSIS — S00.81XA ABRASION, CHIN WITHOUT INFECTION: ICD-10-CM

## 2023-03-20 DIAGNOSIS — S05.11XA CONTUSION OF RIGHT EYE: ICD-10-CM

## 2023-03-20 DIAGNOSIS — S06.0X0A CONCUSSION WITHOUT LOSS OF CONSCIOUSNESS, INITIAL ENCOUNTER: ICD-10-CM

## 2023-03-20 DIAGNOSIS — N39.0 ACUTE UTI: ICD-10-CM

## 2023-03-20 LAB
ALBUMIN SERPL BCG-MCNC: 3.9 G/DL (ref 3.5–5.2)
ALP SERPL-CCNC: 104 U/L (ref 35–104)
ALT SERPL W P-5'-P-CCNC: 10 U/L (ref 10–35)
ANION GAP SERPL CALCULATED.3IONS-SCNC: 12 MMOL/L (ref 7–15)
AST SERPL W P-5'-P-CCNC: 16 U/L (ref 10–35)
ATRIAL RATE - MUSE: 87 BPM
BASOPHILS # BLD AUTO: 0.1 10E3/UL (ref 0–0.2)
BASOPHILS NFR BLD AUTO: 1 %
BILIRUB SERPL-MCNC: <0.2 MG/DL
BUN SERPL-MCNC: 21.4 MG/DL (ref 8–23)
CALCIUM SERPL-MCNC: 9.2 MG/DL (ref 8.8–10.2)
CHLORIDE SERPL-SCNC: 102 MMOL/L (ref 98–107)
CREAT SERPL-MCNC: 0.81 MG/DL (ref 0.51–0.95)
DEPRECATED HCO3 PLAS-SCNC: 25 MMOL/L (ref 22–29)
DIASTOLIC BLOOD PRESSURE - MUSE: NORMAL MMHG
EOSINOPHIL # BLD AUTO: 0.2 10E3/UL (ref 0–0.7)
EOSINOPHIL NFR BLD AUTO: 2 %
ERYTHROCYTE [DISTWIDTH] IN BLOOD BY AUTOMATED COUNT: 13.8 % (ref 10–15)
GFR SERPL CREATININE-BSD FRML MDRD: 72 ML/MIN/1.73M2
GLUCOSE SERPL-MCNC: 101 MG/DL (ref 70–99)
HCT VFR BLD AUTO: 42.3 % (ref 35–47)
HGB BLD-MCNC: 12.5 G/DL (ref 11.7–15.7)
HOLD SPECIMEN: NORMAL
HOLD SPECIMEN: NORMAL
IMM GRANULOCYTES # BLD: 0 10E3/UL
IMM GRANULOCYTES NFR BLD: 1 %
INTERPRETATION ECG - MUSE: NORMAL
LYMPHOCYTES # BLD AUTO: 1.8 10E3/UL (ref 0.8–5.3)
LYMPHOCYTES NFR BLD AUTO: 22 %
MAGNESIUM SERPL-MCNC: 2.3 MG/DL (ref 1.7–2.3)
MCH RBC QN AUTO: 27.3 PG (ref 26.5–33)
MCHC RBC AUTO-ENTMCNC: 29.6 G/DL (ref 31.5–36.5)
MCV RBC AUTO: 92 FL (ref 78–100)
MONOCYTES # BLD AUTO: 0.7 10E3/UL (ref 0–1.3)
MONOCYTES NFR BLD AUTO: 9 %
NEUTROPHILS # BLD AUTO: 5.3 10E3/UL (ref 1.6–8.3)
NEUTROPHILS NFR BLD AUTO: 65 %
NRBC # BLD AUTO: 0 10E3/UL
NRBC BLD AUTO-RTO: 0 /100
P AXIS - MUSE: 57 DEGREES
PLATELET # BLD AUTO: 293 10E3/UL (ref 150–450)
POTASSIUM SERPL-SCNC: 4.3 MMOL/L (ref 3.4–5.3)
PR INTERVAL - MUSE: 142 MS
PROT SERPL-MCNC: 6.8 G/DL (ref 6.4–8.3)
QRS DURATION - MUSE: 72 MS
QT - MUSE: 364 MS
QTC - MUSE: 438 MS
R AXIS - MUSE: -17 DEGREES
RBC # BLD AUTO: 4.58 10E6/UL (ref 3.8–5.2)
SODIUM SERPL-SCNC: 139 MMOL/L (ref 136–145)
SYSTOLIC BLOOD PRESSURE - MUSE: NORMAL MMHG
T AXIS - MUSE: -2 DEGREES
TROPONIN T SERPL HS-MCNC: 15 NG/L
TROPONIN T SERPL HS-MCNC: 15 NG/L
VENTRICULAR RATE- MUSE: 87 BPM
WBC # BLD AUTO: 8.1 10E3/UL (ref 4–11)

## 2023-03-20 PROCEDURE — 70450 CT HEAD/BRAIN W/O DYE: CPT | Mod: 26 | Performed by: STUDENT IN AN ORGANIZED HEALTH CARE EDUCATION/TRAINING PROGRAM

## 2023-03-20 PROCEDURE — 96360 HYDRATION IV INFUSION INIT: CPT

## 2023-03-20 PROCEDURE — 96361 HYDRATE IV INFUSION ADD-ON: CPT

## 2023-03-20 PROCEDURE — 72125 CT NECK SPINE W/O DYE: CPT

## 2023-03-20 PROCEDURE — 36415 COLL VENOUS BLD VENIPUNCTURE: CPT | Performed by: EMERGENCY MEDICINE

## 2023-03-20 PROCEDURE — 99284 EMERGENCY DEPT VISIT MOD MDM: CPT | Mod: 25 | Performed by: EMERGENCY MEDICINE

## 2023-03-20 PROCEDURE — 84484 ASSAY OF TROPONIN QUANT: CPT | Performed by: EMERGENCY MEDICINE

## 2023-03-20 PROCEDURE — 83735 ASSAY OF MAGNESIUM: CPT | Performed by: EMERGENCY MEDICINE

## 2023-03-20 PROCEDURE — 85004 AUTOMATED DIFF WBC COUNT: CPT | Performed by: EMERGENCY MEDICINE

## 2023-03-20 PROCEDURE — 99285 EMERGENCY DEPT VISIT HI MDM: CPT | Mod: 25

## 2023-03-20 PROCEDURE — 70450 CT HEAD/BRAIN W/O DYE: CPT

## 2023-03-20 PROCEDURE — 258N000003 HC RX IP 258 OP 636: Performed by: EMERGENCY MEDICINE

## 2023-03-20 PROCEDURE — 72125 CT NECK SPINE W/O DYE: CPT | Mod: 26 | Performed by: STUDENT IN AN ORGANIZED HEALTH CARE EDUCATION/TRAINING PROGRAM

## 2023-03-20 PROCEDURE — 93010 ELECTROCARDIOGRAM REPORT: CPT | Performed by: EMERGENCY MEDICINE

## 2023-03-20 PROCEDURE — 84484 ASSAY OF TROPONIN QUANT: CPT | Mod: 91 | Performed by: EMERGENCY MEDICINE

## 2023-03-20 PROCEDURE — 80053 COMPREHEN METABOLIC PANEL: CPT | Performed by: EMERGENCY MEDICINE

## 2023-03-20 PROCEDURE — 93005 ELECTROCARDIOGRAM TRACING: CPT

## 2023-03-20 RX ADMIN — SODIUM CHLORIDE 1000 ML: 9 INJECTION, SOLUTION INTRAVENOUS at 20:54

## 2023-03-20 ASSESSMENT — ACTIVITIES OF DAILY LIVING (ADL)
ADLS_ACUITY_SCORE: 35
ADLS_ACUITY_SCORE: 35

## 2023-03-21 VITALS
WEIGHT: 217.8 LBS | TEMPERATURE: 98.3 F | OXYGEN SATURATION: 98 % | DIASTOLIC BLOOD PRESSURE: 78 MMHG | HEIGHT: 63 IN | RESPIRATION RATE: 18 BRPM | SYSTOLIC BLOOD PRESSURE: 142 MMHG | BODY MASS INDEX: 38.59 KG/M2 | HEART RATE: 87 BPM

## 2023-03-21 LAB
ALBUMIN UR-MCNC: NEGATIVE MG/DL
APPEARANCE UR: ABNORMAL
BILIRUB UR QL STRIP: NEGATIVE
COLOR UR AUTO: YELLOW
GLUCOSE UR STRIP-MCNC: NEGATIVE MG/DL
HGB UR QL STRIP: ABNORMAL
KETONES UR STRIP-MCNC: NEGATIVE MG/DL
LEUKOCYTE ESTERASE UR QL STRIP: ABNORMAL
NITRATE UR QL: POSITIVE
PH UR STRIP: 6.5 [PH] (ref 5–7)
SP GR UR STRIP: 1.01 (ref 1–1.03)
UROBILINOGEN UR STRIP-MCNC: NORMAL MG/DL

## 2023-03-21 PROCEDURE — 81003 URINALYSIS AUTO W/O SCOPE: CPT | Performed by: EMERGENCY MEDICINE

## 2023-03-21 RX ORDER — CEPHALEXIN 500 MG/1
500 CAPSULE ORAL 2 TIMES DAILY
Qty: 14 CAPSULE | Refills: 0 | Status: SHIPPED | OUTPATIENT
Start: 2023-03-21

## 2023-03-21 RX ORDER — CEPHALEXIN 500 MG/1
500 CAPSULE ORAL 2 TIMES DAILY
Qty: 14 CAPSULE | Refills: 0 | Status: SHIPPED | OUTPATIENT
Start: 2023-03-21 | End: 2023-03-21

## 2023-03-21 ASSESSMENT — ACTIVITIES OF DAILY LIVING (ADL): ADLS_ACUITY_SCORE: 35

## 2023-03-21 NOTE — ED PROVIDER NOTES
"  History     Chief Complaint   Patient presents with     Fall     Head Injury     HPI  Jessica Ramsey is a 82 year old female with a past medical history of COPD, low back pain, parotitis, osteoarthritis of the knee who presents to the emergency department with a chief complaint of fall. The patient reports that she fell on Friday. She reports that she got up from a chair quickly, started to feel dizzy, and she started to fall. She grabbed a chair on the way but it fell with her. I fell forward and hit my head on the frame of the door\". She is uncertain if she lost consciousness or not, but she does not think so. She hit her R side of her face and R shoulder against a door jam. She is not on blood thinners. Very small lacerations/abrasions to head and chin, no active bleeding. Does have bruising around her R eye now, bruising has been improving though. She has had ongoing dizziness since then. She has also been very fatigued, and has been sleeping all weekend. Denies new numbness, weakness, tingling, vision/hearing changes, difficulty with speech or ambulation. She does report some difficulty \"getting her thoughts organized.\" She has been dealing with some eye problems lately (macular degeneration,s he is being treated for this), she felt like her vision in her R eye was slightly more blurry after the fall, but this seems to have resolved. She denies any history of concussion. She does report she had some numbness to her bilateral legs on Saturday, which has resolved now. She hasn't been drinking much water recently. She hasn't taken anything for pain since she felt. She states she forgot to take her meds the last couple of days and brush her teeth as well. She feels back to her baseline now and does not feel confused. She lives at a senior facility in independent living now. She does have staff members who she can call if she needs to. She has a PCP at [facility]. She has been doing physical therapy and has " been doing well with this. She ambulates with a walker.     Per [facility], last tdap was 2012.     I have reviewed the Medications, Allergies, Past Medical and Surgical History, and Social History in the [employer] system.    Past Medical History:   Diagnosis Date     Arthritis      Asthma      Degenerative joint disease      Depression      Hoarseness      Hypertension      Indigestion      Nasal congestion      Night sweats      Numbness      Pneumonia      Ringing in ears      Sleep problems      Snoring      Sore throat      Trouble swallowing      Unspecified cerebral artery occlusion with cerebral infarction      Weakness      Weight gain      Past Surgical History:   Procedure Laterality Date     BACK SURGERY       HYSTERECTOMY       ORTHOPEDIC SURGERY       GA SPINE SURGERY PROCEDURE UNLISTED       ZZC STOMACH SURGERY PROCEDURE UNLISTED       Current Facility-Administered Medications   Medication     lidocaine (PF) (XYLOCAINE) 1 % injection 4 mL     lidocaine (PF) (XYLOCAINE) 1 % injection 4 mL     lidocaine 1 % injection 4 mL     lidocaine 1 % injection 4 mL     triamcinolone (KENALOG-40) injection 40 mg     triamcinolone (KENALOG-40) injection 40 mg     triamcinolone (KENALOG-40) injection 40 mg     triamcinolone (KENALOG-40) injection 40 mg     triamcinolone acetonide (KENALOG-40) injection 40 mg     triamcinolone acetonide (KENALOG-40) injection 40 mg     Current Outpatient Medications   Medication     acetaminophen (TYLENOL) 650 MG CR tablet     albuterol (2.5 MG/3ML) 0.083% nebulizer solution     albuterol (ACCUNEB) 0.63 MG/3ML nebulizer solution     albuterol (PROAIR HFA/PROVENTIL HFA/VENTOLIN HFA) 108 (90 Base) MCG/ACT inhaler     aspirin 325 MG tablet     atorvastatin (LIPITOR) 40 MG tablet     benzonatate (TESSALON) 100 MG capsule     CALCIUM & MAGNESIUM CARBONATES PO     Cholecalciferol (VITAMIN D) 2000 units tablet     COENZYME Q-10 PO     Escitalopram Oxalate (LEXAPRO PO)     furosemide (LASIX) 20 MG  "tablet     GABAPENTIN PO     losartan (COZAAR) 25 MG tablet     metoprolol succinate ER (TOPROL-XL) 25 MG 24 hr tablet     Multiple Vitamins-Minerals (PRESERVISION AREDS PO)     omeprazole (PRILOSEC) 20 MG capsule     ORDER FOR DME     phenazopyridine (PYRIDIUM) 200 MG tablet     pramipexole (MIRAPEX) 0.5 MG tablet     Sertraline HCl (ZOLOFT PO)     simvastatin (ZOCOR) 40 MG tablet     Tiotropium Bromide Monohydrate (SPIRIVA HANDIHALER IN)     traZODone (DESYREL) 100 MG tablet     triamcinolone (KENALOG) 0.1 % external cream     vitamin D (ERGOCALCIFEROL) 04401 UNIT capsule     Allergies   Allergen Reactions     Lyrica [Pregabalin]      \"felt like I was high and wanted to sleep a lot and did not help with the pain.\"     Niacin      Rapid heartbeat     Sulfa Drugs      Unknown     Tramadol      Dizziness      Naproxen Rash     Patient reported     Past medical history, past surgical history, medications, and allergies were reviewed with the patient. Additional pertinent items: None    Social History     Socioeconomic History     Marital status:      Spouse name: Not on file     Number of children: Not on file     Years of education: Not on file     Highest education level: Not on file   Occupational History     Not on file   Tobacco Use     Smoking status: Former     Packs/day: 1.00     Years: 18.00     Pack years: 18.00     Types: Cigarettes     Smokeless tobacco: Never   Substance and Sexual Activity     Alcohol use: No     Drug use: No     Sexual activity: Never   Other Topics Concern     Parent/sibling w/ CABG, MI or angioplasty before 65F 55M? Not Asked   Social History Narrative     Not on file     Social Determinants of Health     Financial Resource Strain: Not on file   Food Insecurity: Not on file   Transportation Needs: Not on file   Physical Activity: Not on file   Stress: Not on file   Social Connections: Not on file   Intimate Partner Violence: Not on file   Housing Stability: Not on file " "    Social history was reviewed with the patient. Additional pertinent items: None    Review of Systems  A medically appropriate review of systems was performed with pertinent positives and negatives noted in the HPI, and all other systems negative.    Physical Exam   BP: (!) 148/84  Pulse: 101  Temp: 98.1  F (36.7  C)  Resp: 16  Height: 160 cm (5' 3\")  Weight: 98.8 kg (217 lb 12.8 oz)  SpO2: 96 %      General: Well nourished, well developed, NAD  HEENT: EOMI, anicteric. Bruising to face as described on skin exam below, MMM  Neck: no jugular venous distension, supple, nl ROM, no midline TTP or stepoff  Cardiac: Regular rate and rhythm. No murmurs, rubs, or gallops. Normal S1, S2.  Intact peripheral pulses  Pulm: CTAB, no stridor, wheezes, rales, rhonchi  Abd: Soft, nontender, nondistended.  No masses palpated.    Skin: Warm and dry to the touch.  Bruising to face over chin and around/below R eye  Extremities: No LE edema, no cyanosis, w/w/p, TTP over R shoulder, normal ROM and intact rotator cuff, distally NV intact, very mild TTP over L shoulder  Neuro: Alert and oriented x 4, no facial droop, CN II-XII intact, strength 5/5 all 4 extremities, sensation intact throughout, steady gait, no nystagmus, coordination normal as tested. PERRL, EOMI.  No pronator drift, no lower extremity drift.  Speech is clear without aphasia or dysarthria. Steady gait with wheeled walker.     ED Course        Procedures                 ED Course Selections:        EKG Interpretation:      Interpreted by Daphne Dietz MD  Time reviewed: 2020  Symptoms at time of EKG: weakness, fall   Rhythm: normal sinus   Rate: normal, 87 bpm  Axis: normal  Ectopy: PACs  Conduction: normal  ST Segments/ T Waves: No ST-T wave changes  Q Waves: none  Comparison to prior: Unchanged from 11/16/22 other than new PACs    Clinical Impression: normal EKG                Labs Ordered and Resulted from Time of ED Arrival to Time of ED Departure "   COMPREHENSIVE METABOLIC PANEL - Abnormal       Result Value    Sodium 139      Potassium 4.3      Chloride 102      Carbon Dioxide (CO2) 25      Anion Gap 12      Urea Nitrogen 21.4      Creatinine 0.81      Calcium 9.2      Glucose 101 (*)     Alkaline Phosphatase 104      AST 16      ALT 10      Protein Total 6.8      Albumin 3.9      Bilirubin Total <0.2      GFR Estimate 72     TROPONIN T, HIGH SENSITIVITY - Abnormal    Troponin T, High Sensitivity 15 (*)    CBC WITH PLATELETS AND DIFFERENTIAL - Abnormal    WBC Count 8.1      RBC Count 4.58      Hemoglobin 12.5      Hematocrit 42.3      MCV 92      MCH 27.3      MCHC 29.6 (*)     RDW 13.8      Platelet Count 293      % Neutrophils 65      % Lymphocytes 22      % Monocytes 9      % Eosinophils 2      % Basophils 1      % Immature Granulocytes 1      NRBCs per 100 WBC 0      Absolute Neutrophils 5.3      Absolute Lymphocytes 1.8      Absolute Monocytes 0.7      Absolute Eosinophils 0.2      Absolute Basophils 0.1      Absolute Immature Granulocytes 0.0      Absolute NRBCs 0.0     TROPONIN T, HIGH SENSITIVITY - Abnormal    Troponin T, High Sensitivity 15 (*)    MAGNESIUM - Normal    Magnesium 2.3     ROUTINE UA WITH MICROSCOPIC REFLEX TO CULTURE            Results for orders placed or performed during the hospital encounter of 03/20/23 (from the past 24 hour(s))   EKG 12-lead, tracing only   Result Value Ref Range    Systolic Blood Pressure  mmHg    Diastolic Blood Pressure  mmHg    Ventricular Rate 87 BPM    Atrial Rate 87 BPM    UT Interval 142 ms    QRS Duration 72 ms     ms    QTc 438 ms    P Axis 57 degrees    R AXIS -17 degrees    T Axis -2 degrees    Interpretation ECG       Sinus rhythm with Premature atrial complexes  Otherwise normal ECG  Unconfirmed report - interpretation of this ECG is computer generated - see medical record for final interpretation  Confirmed by - EMERGENCY ROOM, PHYSICIAN ([id number]),  SAMEER MAYO ([id number]) on 3/20/2023  9:36:48 PM     CBC with platelets differential    Narrative    The following orders were created for panel order CBC with platelets differential.  Procedure                               Abnormality         Status                     ---------                               -----------         ------                     CBC with platelets and d...[[id number]]  Abnormal            Final result                 Please view results for these tests on the individual orders.   Comprehensive metabolic panel   Result Value Ref Range    Sodium 139 136 - 145 mmol/L    Potassium 4.3 3.4 - 5.3 mmol/L    Chloride 102 98 - 107 mmol/L    Carbon Dioxide (CO2) 25 22 - 29 mmol/L    Anion Gap 12 7 - 15 mmol/L    Urea Nitrogen 21.4 8.0 - 23.0 mg/dL    Creatinine 0.81 0.51 - 0.95 mg/dL    Calcium 9.2 8.8 - 10.2 mg/dL    Glucose 101 (H) 70 - 99 mg/dL    Alkaline Phosphatase 104 35 - 104 U/L    AST 16 10 - 35 U/L    ALT 10 10 - 35 U/L    Protein Total 6.8 6.4 - 8.3 g/dL    Albumin 3.9 3.5 - 5.2 g/dL    Bilirubin Total <0.2 <=1.2 mg/dL    GFR Estimate 72 >60 mL/min/1.73m2   Magnesium   Result Value Ref Range    Magnesium 2.3 1.7 - 2.3 mg/dL   Troponin T, High Sensitivity   Result Value Ref Range    Troponin T, High Sensitivity 15 (H) <=14 ng/L   CBC with platelets and differential   Result Value Ref Range    WBC Count 8.1 4.0 - 11.0 10e3/uL    RBC Count 4.58 3.80 - 5.20 10e6/uL    Hemoglobin 12.5 11.7 - 15.7 g/dL    Hematocrit 42.3 35.0 - 47.0 %    MCV 92 78 - 100 fL    MCH 27.3 26.5 - 33.0 pg    MCHC 29.6 (L) 31.5 - 36.5 g/dL    RDW 13.8 10.0 - 15.0 %    Platelet Count 293 150 - 450 10e3/uL    % Neutrophils 65 %    % Lymphocytes 22 %    % Monocytes 9 %    % Eosinophils 2 %    % Basophils 1 %    % Immature Granulocytes 1 %    NRBCs per 100 WBC 0 <1 /100    Absolute Neutrophils 5.3 1.6 - 8.3 10e3/uL    Absolute Lymphocytes 1.8 0.8 - 5.3 10e3/uL    Absolute Monocytes 0.7 0.0 - 1.3 10e3/uL    Absolute Eosinophils 0.2 0.0 - 0.7 10e3/uL     Absolute Basophils 0.1 0.0 - 0.2 10e3/uL    Absolute Immature Granulocytes 0.0 <=0.4 10e3/uL    Absolute NRBCs 0.0 10e3/uL   [place] Draw    Narrative    The following orders were created for panel order [place] Draw.  Procedure                               Abnormality         Status                     ---------                               -----------         ------                     Extra Blue Top Tube[[id number]]                              Final result               Extra Red Top Tube[[id number]]                               Final result                 Please view results for these tests on the individual orders.   Extra Blue Top Tube   Result Value Ref Range    Hold Specimen [facility]    Extra Red Top Tube   Result Value Ref Range    Hold Specimen [facility]    CT Cervical Spine w/o Contrast    Narrative    EXAM: CT HEAD W/O CONTRAST, CT CERVICAL SPINE W/O CONTRAST  LOCATION: [facility]  DATE/TIME: 3/20/2023 10:05 PM    INDICATION: Fall, head trauma  COMPARISON: No recent comparison.  TECHNIQUE:   1) Routine CT Head without IV contrast. Multiplanar reformats. Dose reduction techniques were used.  2) Routine CT Cervical Spine without IV contrast. Multiplanar reformats. Dose reduction techniques were used.    FINDINGS:   HEAD CT:   INTRACRANIAL CONTENTS: No intracranial hemorrhage, extraaxial collection, or mass effect.  No CT evidence of acute infarct. Severe presumed chronic small vessel ischemic changes. Moderate generalized volume loss. No hydrocephalus.     VISUALIZED ORBITS/SINUSES/MASTOIDS: No intraorbital abnormality. No significant paranasal sinus mucosal disease. No middle ear or mastoid effusion.    BONES/SOFT TISSUES: Mild right frontal and right facial soft tissue swelling. No skull fracture.    CERVICAL SPINE CT:   Motion degraded examination.  VERTEBRA: Normal vertebral body heights.  Reversal of the usual cervical lordosis. No acute compression fracture or  posttraumatic subluxation.     CANAL/FORAMINA: Multilevel spondylosis with at least moderate canal stenosis at C5-C6 and C6-C7.    PARASPINAL:  No prevertebral edema.       Impression    IMPRESSION:  HEAD CT:  1.  No CT evidence for acute intracranial process.  2.  Brain atrophy and presumed chronic microvascular ischemic changes as above.    CERVICAL SPINE CT:  1.  Allowing for motion, no acute cervical spine fracture.  2.  Multilevel spondylosis with at least moderate canal stenosis at C5-C6 and C6-C7.   CT Head w/o Contrast    Narrative    EXAM: CT HEAD W/O CONTRAST, CT CERVICAL SPINE W/O CONTRAST  LOCATION: [facility]  DATE/TIME: 3/20/2023 10:05 PM    INDICATION: Fall, head trauma  COMPARISON: No recent comparison.  TECHNIQUE:   1) Routine CT Head without IV contrast. Multiplanar reformats. Dose reduction techniques were used.  2) Routine CT Cervical Spine without IV contrast. Multiplanar reformats. Dose reduction techniques were used.    FINDINGS:   HEAD CT:   INTRACRANIAL CONTENTS: No intracranial hemorrhage, extraaxial collection, or mass effect.  No CT evidence of acute infarct. Severe presumed chronic small vessel ischemic changes. Moderate generalized volume loss. No hydrocephalus.     VISUALIZED ORBITS/SINUSES/MASTOIDS: No intraorbital abnormality. No significant paranasal sinus mucosal disease. No middle ear or mastoid effusion.    BONES/SOFT TISSUES: Mild right frontal and right facial soft tissue swelling. No skull fracture.    CERVICAL SPINE CT:   Motion degraded examination.  VERTEBRA: Normal vertebral body heights.  Reversal of the usual cervical lordosis. No acute compression fracture or posttraumatic subluxation.     CANAL/FORAMINA: Multilevel spondylosis with at least moderate canal stenosis at C5-C6 and C6-C7.    PARASPINAL:  No prevertebral edema.       Impression    IMPRESSION:  HEAD CT:  1.  No CT evidence for acute intracranial process.  2.   Brain atrophy and presumed chronic microvascular ischemic changes as above.    CERVICAL SPINE CT:  1.  Allowing for motion, no acute cervical spine fracture.  2.  Multilevel spondylosis with at least moderate canal stenosis at C5-C6 and C6-C7.   Troponin T, High Sensitivity   Result Value Ref Range    Troponin T, High Sensitivity 15 (H) <=14 ng/L       Labs, vital signs, and imaging studies were reviewed by me.    Medications   0.9% sodium chloride BOLUS (0 mLs Intravenous Stopped 3/20/23 2226)       Assessments & Plan (with Medical Decision Making)   Jessica Ramsey is a 82 year old female who presents to the emergency department after a fall.  The patient did hit her head.  She has symptoms consistent with a mild concussion, however, will obtain CT head to rule out acute intracranial injury such as ICH, skull fracture.  Due to patient's age, CT of the C-spine was also ordered to rule out acute fracture.  Given patient's ongoing dizziness, which may be secondary to concussion, but could be secondary to other medical issue, labs, EKG were also ordered to further evaluate the patient.  Differential diagnosis for this would include cardiac arrhythmia, ACS, electrolyte abnormality, dehydration/orthostatic hypotension, vasovagal episodes, underlying infectious process (such as UTI).    EKG shows no significant changes compared to prior other than presence of PACs    Laboratory work-up is remarkable for normal white blood cell count, stable hemoglobin at 12.5 (12.83 months ago), normal creatinine and electrolytes, troponin level 15 (was 16/17 4 months ago), and stable at 15 on recheck    CTs show no acute traumatic injury    Critical care was not performed.     Medical Decision Making  The patient's presentation was of low complexity (an acute and uncomplicated illness or injury).    The patient's evaluation involved:  ordering and/or review of 3+ test(s) in this encounter (see separate area of note for details)  review  of 3+ test result(s) ordered prior to this encounter (see separate area of note for details)  independent interpretation of testing performed by another health professional (CTs)    The patient's management necessitated moderate risk (prescription drug management including medications given in the ED).    The patient's urinalysis was delayed as patient was unable to provide urine sample for quite some time as she is incontinent of urine.  Patient was able to provide only a small sample to the lab, which did delay reporting as well.  I did discuss the results that they were able to run off of the sample with lab staff.  They reported to me over the phone that the results are as follows:    Color: Yellow, slightly cloudy  Glucose negative  Bilirubin negative  Ketones negative  Specific gravity 1.015  Blood trace  pH 6.5  Protein negative  Urobilinogen 0.2 (negative)  Nitrates positive  Leukocyte esterase moderate    Despite lab being unable to perform microscopic analysis or culture due to small sample, with positive nitrates and symptoms of UTI reported by patient, will plan to prescribe patient antibiotics.  Patient is allergic to sulfa.  Will prescribe Keflex    Patient's most recent urine culture was reviewed (from 11/30/2022).  It was positive for E. coli resistant to ampicillin as well as ampicillin/sulbactam, but sensitive to cephalosporins.    Culture >100,000 CFU/mL Escherichia coli Abnormal        >100,000 CFU/mL Escherichia coli Abnormal             Resulting Agency: [facility]     Susceptibility     Escherichia coli (1) Escherichia coli (2)     DILSHAD DILSHAD     Ampicillin Resistant Resistant     Ampicillin/ Sulbactam Resistant Resistant     Cefazolin Susceptible 1 Susceptible 1     Cefepime Susceptible Susceptible     Cefoxitin Susceptible Susceptible     Ceftazidime Susceptible Susceptible     Ceftriaxone Susceptible Susceptible     Ciprofloxacin Susceptible Susceptible     Gentamicin Susceptible Susceptible      Levofloxacin Susceptible Susceptible     Nitrofurantoin Susceptible Susceptible     Piperacillin/Tazobactam Susceptible Susceptible     Tobramycin Susceptible Susceptible     Trimethoprim/Sulfamethoxazole Susceptible Susceptible                   I have reviewed the nursing notes.    I have reviewed the findings, diagnosis, plan and need for follow up with the patient.    Patient to be discharged home. Advised to follow up with PCP within 1 week (patient states she does have a follow-up appointment scheduled with her PCP).  Referral to concussion clinic was also provided.  To return to ER immediately with any new/worsening symptoms. Plan of care discussed with patient who expresses understanding and agrees with plan of care.    New Prescriptions    CEPHALEXIN (KEFLEX) 500 MG CAPSULE    Take 1 capsule (500 mg) by mouth 2 times daily for 7 days       Final diagnoses:   Concussion without loss of consciousness, initial encounter   Acute UTI       Daphne Dietz MD  3/20/2023   [facility] EMERGENCY DEPARTMENT     Daphne iDetz MD  03/21/23 0135       Daphne Dietz MD  03/21/23 0208

## 2023-03-21 NOTE — ED TRIAGE NOTES
"Patient reports fall on Friday. States, \"I got up from a chair quickly and I started to fall. I grabbed a chair but it fell with me. I fell forward and hit my head on the frame of the door\". Unknown loss of consciousness. Denies taking blood thinners. Reports continuous dizziness today. Denies difficulty speaking, changes in strength. Reports numbness to bilateral legs on Saturday, which has resolved now.      Triage Assessment     Row Name 03/20/23 1940       Triage Assessment (Adult)    Airway WDL WDL       Respiratory WDL    Respiratory WDL WDL       Skin Circulation/Temperature WDL    Skin Circulation/Temperature WDL X  Bruising right eye and right chin       Cardiac WDL    Cardiac WDL WDL       Peripheral/Neurovascular WDL    Peripheral Neurovascular WDL WDL       Cognitive/Neuro/Behavioral WDL    Cognitive/Neuro/Behavioral WDL X  Continuous dizziness              "

## 2023-03-21 NOTE — DISCHARGE INSTRUCTIONS
TODAY'S VISIT:  You were seen today for fall, weakness  -   - If you had any labs or imaging/radiology tests performed today, you should also discuss these tests with your usual provider.     FOLLOW-UP:  Please make an appointment to follow up with:  - Your Primary Care Provider. If you do not have a PCP, please call the Primary Care Center (phone: [phone] for an appointment  -  concussion clinic (you should receive a call to set up an appointment    - Have your provider review the results from today's visit with you again to make sure no further follow-up or additional testing is needed based on those results.     PRESCRIPTIONS / MEDICATIONS:  - You may take Ibuprofen and/or Tylenol as needed for pain. You can take 600 mg of Ibuprofen every 6 hours and 1 g Tylenol every 6 hours. It may help to alternate these, so you take something every 3 hours.   - keflex x 7 days for UTI    OTHER INSTRUCTIONS:  -  Applying ice to the the bruised area on your face several times a day over the next 1-2 days may also help with pain and swelling    RETURN TO THE EMERGENCY DEPARTMENT  Return to the Emergency Department at any time for any new or worsening symptoms or any concerns.

## 2023-04-01 ENCOUNTER — HEALTH MAINTENANCE LETTER (OUTPATIENT)
Age: 83
End: 2023-04-01

## 2023-04-05 ENCOUNTER — HOSPITAL ENCOUNTER (EMERGENCY)
Facility: CLINIC | Age: 83
Discharge: HOME | End: 2023-04-05
Attending: EMERGENCY MEDICINE | Admitting: EMERGENCY MEDICINE
Payer: COMMERCIAL

## 2023-04-05 ENCOUNTER — APPOINTMENT (OUTPATIENT)
Dept: GENERAL RADIOLOGY | Facility: CLINIC | Age: 83
End: 2023-04-05
Attending: EMERGENCY MEDICINE
Payer: COMMERCIAL

## 2023-04-05 ENCOUNTER — APPOINTMENT (OUTPATIENT)
Dept: CT IMAGING | Facility: CLINIC | Age: 83
End: 2023-04-05
Attending: EMERGENCY MEDICINE
Payer: COMMERCIAL

## 2023-04-05 VITALS
HEART RATE: 80 BPM | WEIGHT: 217 LBS | OXYGEN SATURATION: 98 % | BODY MASS INDEX: 38.45 KG/M2 | SYSTOLIC BLOOD PRESSURE: 166 MMHG | HEIGHT: 63 IN | DIASTOLIC BLOOD PRESSURE: 98 MMHG | TEMPERATURE: 97.5 F | RESPIRATION RATE: 16 BRPM

## 2023-04-05 DIAGNOSIS — F07.81 POST CONCUSSION SYNDROME: ICD-10-CM

## 2023-04-05 DIAGNOSIS — R35.0 FREQUENT URINATION: ICD-10-CM

## 2023-04-05 DIAGNOSIS — N39.0 ACUTE UTI: ICD-10-CM

## 2023-04-05 LAB
ALBUMIN SERPL BCG-MCNC: 4.5 G/DL (ref 3.5–5.2)
ALBUMIN UR-MCNC: NEGATIVE MG/DL
ALP SERPL-CCNC: 119 U/L (ref 35–104)
ALT SERPL W P-5'-P-CCNC: 10 U/L (ref 10–35)
ANION GAP SERPL CALCULATED.3IONS-SCNC: 13 MMOL/L (ref 7–15)
APPEARANCE UR: CLEAR
AST SERPL W P-5'-P-CCNC: 16 U/L (ref 10–35)
ATRIAL RATE - MUSE: 80 BPM
BACTERIA #/AREA URNS HPF: ABNORMAL /HPF
BASOPHILS # BLD AUTO: 0 10E3/UL (ref 0–0.2)
BASOPHILS NFR BLD AUTO: 1 %
BILIRUB SERPL-MCNC: 0.3 MG/DL
BILIRUB UR QL STRIP: NEGATIVE
BUN SERPL-MCNC: 15.5 MG/DL (ref 8–23)
CALCIUM SERPL-MCNC: 9.5 MG/DL (ref 8.8–10.2)
CHLORIDE SERPL-SCNC: 102 MMOL/L (ref 98–107)
COLOR UR AUTO: ABNORMAL
CREAT SERPL-MCNC: 0.83 MG/DL (ref 0.51–0.95)
DEPRECATED HCO3 PLAS-SCNC: 25 MMOL/L (ref 22–29)
DIASTOLIC BLOOD PRESSURE - MUSE: NORMAL MMHG
EOSINOPHIL # BLD AUTO: 0.1 10E3/UL (ref 0–0.7)
EOSINOPHIL NFR BLD AUTO: 2 %
ERYTHROCYTE [DISTWIDTH] IN BLOOD BY AUTOMATED COUNT: 13.3 % (ref 10–15)
GFR SERPL CREATININE-BSD FRML MDRD: 70 ML/MIN/1.73M2
GLUCOSE SERPL-MCNC: 98 MG/DL (ref 70–99)
GLUCOSE UR STRIP-MCNC: NEGATIVE MG/DL
HCT VFR BLD AUTO: 43.2 % (ref 35–47)
HGB BLD-MCNC: 13.1 G/DL (ref 11.7–15.7)
HGB UR QL STRIP: NEGATIVE
HOLD SPECIMEN: NORMAL
HOLD SPECIMEN: NORMAL
IMM GRANULOCYTES # BLD: 0 10E3/UL
IMM GRANULOCYTES NFR BLD: 0 %
INTERPRETATION ECG - MUSE: NORMAL
KETONES UR STRIP-MCNC: NEGATIVE MG/DL
LEUKOCYTE ESTERASE UR QL STRIP: NEGATIVE
LYMPHOCYTES # BLD AUTO: 1.3 10E3/UL (ref 0.8–5.3)
LYMPHOCYTES NFR BLD AUTO: 19 %
MCH RBC QN AUTO: 27.6 PG (ref 26.5–33)
MCHC RBC AUTO-ENTMCNC: 30.3 G/DL (ref 31.5–36.5)
MCV RBC AUTO: 91 FL (ref 78–100)
MONOCYTES # BLD AUTO: 0.4 10E3/UL (ref 0–1.3)
MONOCYTES NFR BLD AUTO: 6 %
NEUTROPHILS # BLD AUTO: 5 10E3/UL (ref 1.6–8.3)
NEUTROPHILS NFR BLD AUTO: 72 %
NITRATE UR QL: POSITIVE
NRBC # BLD AUTO: 0 10E3/UL
NRBC BLD AUTO-RTO: 0 /100
P AXIS - MUSE: -8 DEGREES
PH UR STRIP: 5.5 [PH] (ref 5–7)
PLATELET # BLD AUTO: 249 10E3/UL (ref 150–450)
POTASSIUM SERPL-SCNC: 4.1 MMOL/L (ref 3.4–5.3)
PR INTERVAL - MUSE: 148 MS
PROT SERPL-MCNC: 7.3 G/DL (ref 6.4–8.3)
QRS DURATION - MUSE: 78 MS
QT - MUSE: 362 MS
QTC - MUSE: 417 MS
R AXIS - MUSE: -25 DEGREES
RBC # BLD AUTO: 4.75 10E6/UL (ref 3.8–5.2)
RBC URINE: <1 /HPF
SODIUM SERPL-SCNC: 140 MMOL/L (ref 136–145)
SP GR UR STRIP: 1.01 (ref 1–1.03)
SYSTOLIC BLOOD PRESSURE - MUSE: NORMAL MMHG
T AXIS - MUSE: 8 DEGREES
TROPONIN T SERPL HS-MCNC: 12 NG/L
UROBILINOGEN UR STRIP-MCNC: NORMAL MG/DL
VENTRICULAR RATE- MUSE: 80 BPM
WBC # BLD AUTO: 6.9 10E3/UL (ref 4–11)
WBC URINE: 3 /HPF

## 2023-04-05 PROCEDURE — 71046 X-RAY EXAM CHEST 2 VIEWS: CPT

## 2023-04-05 PROCEDURE — 85025 COMPLETE CBC W/AUTO DIFF WBC: CPT | Performed by: EMERGENCY MEDICINE

## 2023-04-05 PROCEDURE — 93005 ELECTROCARDIOGRAM TRACING: CPT | Performed by: EMERGENCY MEDICINE

## 2023-04-05 PROCEDURE — 81001 URINALYSIS AUTO W/SCOPE: CPT | Performed by: EMERGENCY MEDICINE

## 2023-04-05 PROCEDURE — 71046 X-RAY EXAM CHEST 2 VIEWS: CPT | Mod: 26 | Performed by: RADIOLOGY

## 2023-04-05 PROCEDURE — 80053 COMPREHEN METABOLIC PANEL: CPT | Performed by: EMERGENCY MEDICINE

## 2023-04-05 PROCEDURE — 70450 CT HEAD/BRAIN W/O DYE: CPT

## 2023-04-05 PROCEDURE — 84484 ASSAY OF TROPONIN QUANT: CPT | Performed by: EMERGENCY MEDICINE

## 2023-04-05 PROCEDURE — 93010 ELECTROCARDIOGRAM REPORT: CPT | Performed by: EMERGENCY MEDICINE

## 2023-04-05 PROCEDURE — 70450 CT HEAD/BRAIN W/O DYE: CPT | Mod: 26 | Performed by: RADIOLOGY

## 2023-04-05 PROCEDURE — 99285 EMERGENCY DEPT VISIT HI MDM: CPT | Mod: 25 | Performed by: EMERGENCY MEDICINE

## 2023-04-05 PROCEDURE — 36415 COLL VENOUS BLD VENIPUNCTURE: CPT | Performed by: EMERGENCY MEDICINE

## 2023-04-05 PROCEDURE — 99284 EMERGENCY DEPT VISIT MOD MDM: CPT | Mod: 25 | Performed by: EMERGENCY MEDICINE

## 2023-04-05 PROCEDURE — 87186 SC STD MICRODIL/AGAR DIL: CPT | Performed by: EMERGENCY MEDICINE

## 2023-04-05 RX ORDER — CIPROFLOXACIN 500 MG/1
500 TABLET, FILM COATED ORAL 2 TIMES DAILY
Qty: 6 TABLET | Refills: 0 | Status: SHIPPED | OUTPATIENT
Start: 2023-04-05

## 2023-04-05 RX ORDER — CIPROFLOXACIN 500 MG/1
500 TABLET, FILM COATED ORAL 2 TIMES DAILY
Qty: 6 TABLET | Refills: 0 | Status: SHIPPED | OUTPATIENT
Start: 2023-04-05 | End: 2023-04-05

## 2023-04-05 ASSESSMENT — ACTIVITIES OF DAILY LIVING (ADL): ADLS_ACUITY_SCORE: 35

## 2023-04-05 NOTE — ED TRIAGE NOTES
Pt presents with continued 'spaciness' and shortness of breath since a fall on 3/20/23 when she was diagnosed with concussion.  She also continues to have UTI symptoms after starting Keflex for UTI on Monday.       Triage Assessment     Row Name 04/05/23 6922       Triage Assessment (Adult)    Airway WDL WDL       Respiratory WDL    Respiratory WDL WDL       Skin Circulation/Temperature WDL    Skin Circulation/Temperature WDL WDL       Cardiac WDL    Cardiac WDL WDL       Peripheral/Neurovascular WDL    Peripheral Neurovascular WDL WDL       Cognitive/Neuro/Behavioral WDL    Cognitive/Neuro/Behavioral WDL WDL

## 2023-04-05 NOTE — DISCHARGE INSTRUCTIONS
TODAY'S VISIT:  You were seen today for frequent urination, shortness of breath  -   - If you had any labs or imaging/radiology tests performed today, you should also discuss these tests with your usual provider.     FOLLOW-UP:  Please make an appointment to follow up with:  - Your Primary Care Provider. If you do not have a PCP, please call the [facility] (phone: [phone] for an appointment  - Concussion clinic    - Have your provider review the results from today's visit with you again to make sure no further follow-up or additional testing is needed based on those results.     RETURN TO THE EMERGENCY DEPARTMENT  Return to the Emergency Department at any time for any new or worsening symptoms or any concerns.

## 2023-04-05 NOTE — ED PROVIDER NOTES
"  History     Chief Complaint   Patient presents with     Shortness of Breath     HPI  Jessica Ramsey is a 82 year old female with a past medical history of COPD, low back pain, parotitis, osteoarthritis of the knee who presents to the emergency department with multiple complaints.    The patient was seen by myself in the emergency department on 3/20/2023 after a fall that occurred after she got up from a chair too quickly and got dizzy and fell.  The patient had hit her head during the fall.  She is not on blood thinners.  She had no other associated injuries.  Head CT was negative, but the patient was diagnosed with concussion due to her symptoms, which included difficulty \"getting her thoughts organized\".  The patient was referred to follow-up with her PCP and a referral was placed to the concussion clinic as well.  The patient states that she was initially feeling better, however, today she began to have episodes of \"spaciness.\"  The patient states that she called her primary care provider who referred her to come to the emergency department for further evaluation.  The patient denies any other neurologic symptoms, such as numbness, weakness, difficulty with speech or ambulation, or new vision changes.  She does report some tingling in her bilateral lower extremities, but this is not new.  She reports that she was recently prescribed medication by her primary doctor for this for restless legs.    Additionally, the patient complains of an episode of shortness of breath and chest pressure, however, she thinks that this may be attributable to anxiety she had while trying to make arrangements so she could come to the emergency department.  She states that she had to call a cab and find care for her dog.    Lastly, the patient's reports ongoing urinary tract infection symptoms.  She was diagnosed with a urinary tract infection during her visit to the emergency department on 3/20 and was prescribed a course of " Keflex.  Her urinalysis that day showed nitrates.  Unfortunately, patient was not able to provide a large enough urine sample to obtain microscopic analysis or cultures.  Patient did have a previous culture from 11/30/2022 which showed E. coli resistant to ampicillin as well as ampicillin/sulbactam, but otherwise pansensitive.  She reports that she completed this course of antibiotics, but continues to have frequent urination.  She denies dysuria.  The patient is incontinent of urine at baseline.  He thinks she has been having a large volume of urination as well.    I have reviewed the Medications, Allergies, Past Medical and Surgical History, and Social History in the Epic system.    IMAGING from 3/20/23  HEAD CT:  1.  No CT evidence for acute intracranial process.  2.  Brain atrophy and presumed chronic microvascular ischemic changes as above.     CERVICAL SPINE CT:  1.  Allowing for motion, no acute cervical spine fracture.  2.  Multilevel spondylosis with at least moderate canal stenosis at C5-C6 and C6-C7    Past Medical History:   Diagnosis Date     Arthritis      Asthma      Degenerative joint disease      Depression      Hoarseness      Hypertension      Indigestion      Nasal congestion      Night sweats      Numbness      Pneumonia      Ringing in ears      Sleep problems      Snoring      Sore throat      Trouble swallowing      Unspecified cerebral artery occlusion with cerebral infarction      Weakness      Weight gain      Past Surgical History:   Procedure Laterality Date     BACK SURGERY       HYSTERECTOMY       ORTHOPEDIC SURGERY       PA SPINE SURGERY PROCEDURE UNLISTED       ZZC STOMACH SURGERY PROCEDURE UNLISTED       Current Facility-Administered Medications   Medication     lidocaine (PF) (XYLOCAINE) 1 % injection 4 mL     lidocaine (PF) (XYLOCAINE) 1 % injection 4 mL     lidocaine 1 % injection 4 mL     lidocaine 1 % injection 4 mL     triamcinolone (KENALOG-40) injection 40 mg      "triamcinolone (KENALOG-40) injection 40 mg     triamcinolone (KENALOG-40) injection 40 mg     triamcinolone (KENALOG-40) injection 40 mg     triamcinolone acetonide (KENALOG-40) injection 40 mg     triamcinolone acetonide (KENALOG-40) injection 40 mg     Current Outpatient Medications   Medication     acetaminophen (TYLENOL) 650 MG CR tablet     albuterol (2.5 MG/3ML) 0.083% nebulizer solution     albuterol (ACCUNEB) 0.63 MG/3ML nebulizer solution     albuterol (PROAIR HFA/PROVENTIL HFA/VENTOLIN HFA) 108 (90 Base) MCG/ACT inhaler     aspirin 325 MG tablet     atorvastatin (LIPITOR) 40 MG tablet     benzonatate (TESSALON) 100 MG capsule     CALCIUM & MAGNESIUM CARBONATES PO     cephALEXin (KEFLEX) 500 MG capsule     Cholecalciferol (VITAMIN D) 2000 units tablet     COENZYME Q-10 PO     Escitalopram Oxalate (LEXAPRO PO)     furosemide (LASIX) 20 MG tablet     GABAPENTIN PO     losartan (COZAAR) 25 MG tablet     metoprolol succinate ER (TOPROL-XL) 25 MG 24 hr tablet     Multiple Vitamins-Minerals (PRESERVISION AREDS PO)     omeprazole (PRILOSEC) 20 MG capsule     ORDER FOR DME     phenazopyridine (PYRIDIUM) 200 MG tablet     pramipexole (MIRAPEX) 0.5 MG tablet     Sertraline HCl (ZOLOFT PO)     simvastatin (ZOCOR) 40 MG tablet     Tiotropium Bromide Monohydrate (SPIRIVA HANDIHALER IN)     traZODone (DESYREL) 100 MG tablet     triamcinolone (KENALOG) 0.1 % external cream     vitamin D (ERGOCALCIFEROL) 75935 UNIT capsule     Allergies   Allergen Reactions     Lyrica [Pregabalin]      \"felt like I was high and wanted to sleep a lot and did not help with the pain.\"     Niacin      Rapid heartbeat     Sulfa Drugs      Unknown     Tramadol      Dizziness      Naproxen Rash     Patient reported     Past medical history, past surgical history, medications, and allergies were reviewed with the patient. Additional pertinent items: None    Social History     Socioeconomic History     Marital status:      Spouse name: Not " "on file     Number of children: Not on file     Years of education: Not on file     Highest education level: Not on file   Occupational History     Not on file   Tobacco Use     Smoking status: Former     Packs/day: 1.00     Years: 18.00     Pack years: 18.00     Types: Cigarettes     Smokeless tobacco: Never   Vaping Use     Vaping status: Not on file   Substance and Sexual Activity     Alcohol use: No     Drug use: No     Sexual activity: Never   Other Topics Concern     Parent/sibling w/ CABG, MI or angioplasty before 65F 55M? Not Asked   Social History Narrative     Not on file     Social Determinants of Health     Financial Resource Strain: Not on file   Food Insecurity: Not on file   Transportation Needs: Not on file   Physical Activity: Not on file   Stress: Not on file   Social Connections: Not on file   Intimate Partner Violence: Not on file   Housing Stability: Not on file     Social history was reviewed with the patient. Additional pertinent items: None    Review of Systems  A medically appropriate review of systems was performed with pertinent positives and negatives noted in the HPI, and all other systems negative.    Physical Exam   BP: 110/70  Pulse: 88  Temp: 97.5  F (36.4  C)  Resp: 16  Height: 160 cm (5' 3\")  Weight: 98.4 kg (217 lb)  SpO2: 96 %      General: Well nourished, well developed, NAD  HEENT: EOMI, anicteric. NCAT, MMM  Neck: no jugular venous distension, supple, nl ROM  Cardiac: Regular rate and rhythm. No murmurs, rubs, or gallops. Normal S1, S2.  Intact peripheral pulses  Pulm: CTAB, no stridor, wheezes, rales, rhonchi  Abd: Soft, nontender, nondistended.  No masses palpated.    Skin: Warm and dry to the touch.  No rash  Extremities: No LE edema, no cyanosis, w/w/p  Neuro: A&Ox3, no gross focal deficits    ED Course        Procedures                 ED Course Selections:        EKG Interpretation:      Interpreted by Daphne Dietz MD  Time reviewed: 1442  Symptoms at time of " EKG: none, prior episode of chest pressure, SOB   Rhythm: normal sinus   Rate: normal  Axis: normal  Ectopy: none  Conduction: normal  ST Segments/ T Waves: No ST-T wave changes  Q Waves: none  Comparison to prior: No significant change from 3/20/23    Clinical Impression: normal EKG                Labs Ordered and Resulted from Time of ED Arrival to Time of ED Departure   ROUTINE UA WITH MICROSCOPIC REFLEX TO CULTURE - Abnormal       Result Value    Color Urine Light Yellow      Appearance Urine Clear      Glucose Urine Negative      Bilirubin Urine Negative      Ketones Urine Negative      Specific Gravity Urine 1.009      Blood Urine Negative      pH Urine 5.5      Protein Albumin Urine Negative      Urobilinogen Urine Normal      Nitrite Urine Positive (*)     Leukocyte Esterase Urine Negative      Bacteria Urine Moderate (*)     RBC Urine <1      WBC Urine 3     CBC WITH PLATELETS AND DIFFERENTIAL - Abnormal    WBC Count 6.9      RBC Count 4.75      Hemoglobin 13.1      Hematocrit 43.2      MCV 91      MCH 27.6      MCHC 30.3 (*)     RDW 13.3      Platelet Count 249      % Neutrophils 72      % Lymphocytes 19      % Monocytes 6      % Eosinophils 2      % Basophils 1      % Immature Granulocytes 0      NRBCs per 100 WBC 0      Absolute Neutrophils 5.0      Absolute Lymphocytes 1.3      Absolute Monocytes 0.4      Absolute Eosinophils 0.1      Absolute Basophils 0.0      Absolute Immature Granulocytes 0.0      Absolute NRBCs 0.0     COMPREHENSIVE METABOLIC PANEL   TROPONIN T, HIGH SENSITIVITY   URINE CULTURE            Results for orders placed or performed during the hospital encounter of 04/05/23 (from the past 24 hour(s))   XR Chest 2 Views    Narrative    Exam: XR CHEST 2 VIEWS, 4/5/2023 2:29 PM    Comparison: 11/16/2022    History: shortness of breath, fall    Findings:  PA and lateral views of the chest. Cardiac silhouette is within normal  limits. No pleural effusion or pneumothorax. No focal  opacity.  Multilevel degenerative changes of the thoracic spine. Abnormality  previously at the crown of the right humeral head is partially  obscured by overlying scapular bone.       Impression    Impression:   1. No acute findings.    I have personally reviewed the examination and initial interpretation  and I agree with the findings.    DEACON PALACIOS MD         SYSTEM ID:  [id number]   EKG 12-lead, tracing only   Result Value Ref Range    Systolic Blood Pressure  mmHg    Diastolic Blood Pressure  mmHg    Ventricular Rate 80 BPM    Atrial Rate 80 BPM    VT Interval 148 ms    QRS Duration 78 ms     ms    QTc 417 ms    P Axis -8 degrees    R AXIS -25 degrees    T Axis 8 degrees    Interpretation ECG       Sinus rhythm  Minimal voltage criteria for LVH, may be normal variant  Anterolateral infarct , age undetermined  Abnormal ECG  Unconfirmed report - interpretation of this ECG is computer generated - see medical record for final interpretation  Confirmed by - EMERGENCY ROOM, PHYSICIAN ([id number]),  LORI BEE ([id number]) on 4/5/2023 3:41:24 PM     UA with Microscopic reflex to Culture    Specimen: Urine, Midstream   Result Value Ref Range    Color Urine Light Yellow Colorless, Straw, Light Yellow, Yellow    Appearance Urine Clear Clear    Glucose Urine Negative Negative mg/dL    Bilirubin Urine Negative Negative    Ketones Urine Negative Negative mg/dL    Specific Gravity Urine 1.009 1.003 - 1.035    Blood Urine Negative Negative    pH Urine 5.5 5.0 - 7.0    Protein Albumin Urine Negative Negative mg/dL    Urobilinogen Urine Normal Normal, 2.0 mg/dL    Nitrite Urine Positive (A) Negative    Leukocyte Esterase Urine Negative Negative    Bacteria Urine Moderate (A) None Seen /HPF    RBC Urine <1 <=2 /HPF    WBC Urine 3 <=5 /HPF    Narrative    Urine Culture ordered based on laboratory criteria   CT Head w/o Contrast    Narrative    CT HEAD W/O CONTRAST 4/5/2023 3:03 PM    History: AMS, recent head trauma      Comparison: 3/20/2023 and 1/24/2012 head CT    Technique: Using multidetector thin collimation helical acquisition  technique, axial, coronal and sagittal CT images from the skull base  to the vertex were obtained without intravenous contrast.    Findings: There is no intracranial hemorrhage, mass effect, or midline  shift. No acute loss of gray-white differentiation.. Ventricles are  proportionate in size to the cerebral sulci. Moderate cerebral volume  loss. The basal cisterns are clear. There is patchy, confluent  hypoattenuation within the periventricular white matter which is  nonspecific but most suggestive of chronic small vessel ischemic  disease given the patient's age.    The bony calvaria and the bones of the skull base are normal. The  visualized portions of the paranasal sinuses and mastoid air cells are  clear.       Impression    Impression:    1. No acute intracranial pathology.   2. Moderate cerebral atrophy and leukoaraiosis.     I have personally reviewed the examination and initial interpretation  and I agree with the findings.    NANCY HUFFMAN MD         SYSTEM ID:  [id number]   CBC with platelets differential    Narrative    The following orders were created for panel order CBC with platelets differential.  Procedure                               Abnormality         Status                     ---------                               -----------         ------                     CBC with platelets and d...[[id number]]  Abnormal            Final result                 Please view results for these tests on the individual orders.   [facility] Draw    Narrative    The following orders were created for panel order [facility] Draw.  Procedure                               Abnormality         Status                     ---------                               -----------         ------                     Extra Blue Top Tube[[id number]]                              In process                 Extra Red Top  Tube[[id number]]                               In process                   Please view results for these tests on the individual orders.   CBC with platelets and differential   Result Value Ref Range    WBC Count 6.9 4.0 - 11.0 10e3/uL    RBC Count 4.75 3.80 - 5.20 10e6/uL    Hemoglobin 13.1 11.7 - 15.7 g/dL    Hematocrit 43.2 35.0 - 47.0 %    MCV 91 78 - 100 fL    MCH 27.6 26.5 - 33.0 pg    MCHC 30.3 (L) 31.5 - 36.5 g/dL    RDW 13.3 10.0 - 15.0 %    Platelet Count 249 150 - 450 10e3/uL    % Neutrophils 72 %    % Lymphocytes 19 %    % Monocytes 6 %    % Eosinophils 2 %    % Basophils 1 %    % Immature Granulocytes 0 %    NRBCs per 100 WBC 0 <1 /100    Absolute Neutrophils 5.0 1.6 - 8.3 10e3/uL    Absolute Lymphocytes 1.3 0.8 - 5.3 10e3/uL    Absolute Monocytes 0.4 0.0 - 1.3 10e3/uL    Absolute Eosinophils 0.1 0.0 - 0.7 10e3/uL    Absolute Basophils 0.0 0.0 - 0.2 10e3/uL    Absolute Immature Granulocytes 0.0 <=0.4 10e3/uL    Absolute NRBCs 0.0 10e3/uL       Labs, vital signs, and imaging studies were reviewed by me.    Medications - No data to display    Assessments & Plan (with Medical Decision Making)   Jessica Ramsey is a 82 year old female who presents to the emergency department with ongoing difficulty concentrating in the setting of recent concussion.  Patient's symptoms are likely secondary to postconcussion syndrome, however, differential diagnosis would also include CVA/ICH, metabolic abnormality (hypoglycemia, electrolyte abnormality) underlying infection, etc. Will obtain head CT as well as labs to further evaluate the patient.  Patient does report chest pressure and shortness of breath today, this may be secondary to anxiety, but given patient's age, she would also be at risk for ACS, cardiac arrhythmia and differential diagnosis would also include pneumonia, bronchitis.  Pulmonary embolism less likely given lack of chest pain and normal heart rate and SPO2.  Labs, EKG, chest x-ray ordered to further  evaluate the patient.  Lastly, patient complains of ongoing urinary frequency.  She was recently diagnosed with urinary tract infection and did complete her prescribed course of antibiotics for this.  Will obtain repeat urinalysis today and ideally send for urine culture for further evaluation.    EKG shows no significant changes, NSR.    Laboratory work-up is remarkable for normal WBC count, normal Hgb, UA positive for nitrites but with only 3 WBCs. Will treat with another course of antibiotics and send for urine culture. Pt was previously treated with keflex without improvement in symptoms. She is allergic to sulfa.     Patient's most recent urine culture was reviewed (from 11/30/2022).  It was positive for E. coli resistant to ampicillin as well as ampicillin/sulbactam, but sensitive to cephalosporins.     Culture >100,000 CFU/mL Escherichia coli Abnormal         >100,000 CFU/mL Escherichia coli Abnormal               Resulting Agency: [facility]      Susceptibility              Escherichia coli (1) Escherichia coli (2)       DILSHAD DILSHAD       Ampicillin Resistant Resistant       Ampicillin/ Sulbactam Resistant Resistant       Cefazolin Susceptible 1 Susceptible 1       Cefepime Susceptible Susceptible       Cefoxitin Susceptible Susceptible       Ceftazidime Susceptible Susceptible       Ceftriaxone Susceptible Susceptible       Ciprofloxacin Susceptible Susceptible       Gentamicin Susceptible Susceptible       Levofloxacin Susceptible Susceptible       Nitrofurantoin Susceptible Susceptible       Piperacillin/Tazobactam Susceptible Susceptible       Tobramycin Susceptible Susceptible       Trimethoprim/Sulfamethoxazole Susceptible Susceptible                  Head CT shows NAD.    Chest x-ray shows NAD    Critical care was not performed.     Medical Decision Making  The patient's presentation was of moderate complexity (an acute complicated injury).    The patient's evaluation involved:  ordering and/or review of 3+  test(s) in this encounter (see separate area of note for details)  review of 3+ test result(s) ordered prior to this encounter (see separate area of note for details)  independent interpretation of testing performed by another health professional (CT, CXR)    The patient's management necessitated only low risk treatment.    I have reviewed the nursing notes.    I have reviewed the findings, diagnosis, plan and need for follow up with the patient.    Patient to be discharged home. Advised to follow up with PCP and concussion clinic (referral again placed as pt has not yet heard from them for scheduling). Pt also reports that her PCP has discussed sending her to vestibular rehab for possible BPPV for her dizziness. I discussed with the patient that this could be a good idea as well. To return to ER immediately with any new/worsening symptoms. Plan of care discussed with patient who expresses understanding and agrees with plan of care.    New Prescriptions    CIPROFLOXACIN (CIPRO) 500 MG TABLET    Take 1 tablet (500 mg) by mouth 2 times daily for 3 days       Final diagnoses:   Post concussion syndrome   Frequent urination   Acute UTI       Daphne Dietz MD  4/5/2023   [facility] EMERGENCY DEPARTMENT     Daphne Dietz MD  04/05/23 [id number]       Daphne Dietz MD  04/05/23 [id number]

## 2023-04-08 LAB — BACTERIA UR CULT: ABNORMAL

## 2023-04-08 NOTE — RESULT ENCOUNTER NOTE
Final Urine Culture Report on 4/8/23  [facility] Emergency Dept discharge antibiotic prescribed: Ciprofloxacin (Cipro) 500 mg tablet, 1 tablet (500 mg) by mouth 2 times daily for 3 days.  #1. Bacteria, >100,000 CFU/ML Escherichia coli , is SUSCEPTIBLE to Antibiotic.    No change in treatment per [facility] ED lab result Urine Culture protocol.

## 2023-06-27 ENCOUNTER — OFFICE VISIT (OUTPATIENT)
Dept: URGENT CARE | Facility: URGENT CARE | Age: 83
End: 2023-06-27
Payer: COMMERCIAL

## 2023-06-27 VITALS
OXYGEN SATURATION: 97 % | TEMPERATURE: 97.8 F | HEIGHT: 63 IN | WEIGHT: 225 LBS | SYSTOLIC BLOOD PRESSURE: 156 MMHG | BODY MASS INDEX: 39.87 KG/M2 | DIASTOLIC BLOOD PRESSURE: 90 MMHG | HEART RATE: 84 BPM

## 2023-06-27 DIAGNOSIS — Z23 NEED FOR DIPHTHERIA-TETANUS-PERTUSSIS (TDAP) VACCINE: ICD-10-CM

## 2023-06-27 DIAGNOSIS — I87.2 VENOUS STASIS DERMATITIS OF RIGHT LOWER EXTREMITY: ICD-10-CM

## 2023-06-27 DIAGNOSIS — S81.811A LACERATION OF RIGHT LOWER EXTREMITY, INITIAL ENCOUNTER: Primary | ICD-10-CM

## 2023-06-27 PROBLEM — F51.01 PRIMARY INSOMNIA: Status: ACTIVE | Noted: 2018-02-15

## 2023-06-27 PROBLEM — F32.9 MDD (MAJOR DEPRESSIVE DISORDER): Status: ACTIVE | Noted: 2023-06-27

## 2023-06-27 PROBLEM — I63.9 CEREBRAL INFARCTION (H): Status: ACTIVE | Noted: 2023-06-27

## 2023-06-27 PROBLEM — R41.3 MEMORY CHANGES: Status: ACTIVE | Noted: 2018-02-15

## 2023-06-27 PROBLEM — R94.31 ABNORMAL EKG: Status: ACTIVE | Noted: 2018-02-15

## 2023-06-27 PROBLEM — R74.8 ELEVATED ALKALINE PHOSPHATASE LEVEL: Status: ACTIVE | Noted: 2017-11-14

## 2023-06-27 PROBLEM — I35.0 NONRHEUMATIC AORTIC VALVE STENOSIS: Status: ACTIVE | Noted: 2019-08-14

## 2023-06-27 PROBLEM — R42 DIZZINESS: Status: ACTIVE | Noted: 2018-02-15

## 2023-06-27 PROBLEM — R94.01 EEG ABNORMALITY: Status: ACTIVE | Noted: 2018-02-15

## 2023-06-27 PROBLEM — I10 ESSENTIAL HYPERTENSION: Status: ACTIVE | Noted: 2019-04-20

## 2023-06-27 PROCEDURE — 99213 OFFICE O/P EST LOW 20 MIN: CPT | Mod: 25 | Performed by: NURSE PRACTITIONER

## 2023-06-27 PROCEDURE — 90471 IMMUNIZATION ADMIN: CPT | Performed by: NURSE PRACTITIONER

## 2023-06-27 PROCEDURE — 90715 TDAP VACCINE 7 YRS/> IM: CPT | Performed by: NURSE PRACTITIONER

## 2023-06-27 RX ORDER — CEPHALEXIN 500 MG/1
500 CAPSULE ORAL 3 TIMES DAILY
Qty: 21 CAPSULE | Refills: 0 | Status: SHIPPED | OUTPATIENT
Start: 2023-06-27 | End: 2023-07-04

## 2023-06-27 NOTE — PATIENT INSTRUCTIONS
Open laceration to right lower extremity  Shared decision making on Gelfoam and pressure dressing in  12 hours have passed since laceration and the wound is impossible to approximate with stitches  Leave the dressing in place for several days and then change as needed thereafter  Let the Gelfoam inside slough off as it is ready  Thereafter can dab with lukewarm soapy water bacitracin zinc and a new bandage daily  Reevaluation if fever odor or purulent discharge increasing pain worsening  Tdap today as last done 04/26/2012

## 2023-06-27 NOTE — PROGRESS NOTES
"SUBJECTIVE:     Chief Complaint   Patient presents with     Urgent Care     Laceration     Pt in clinic to have eval for right leg laceration.     Jessica Ramsey is a 82 year old female who presents to the clinic with a laceration on the right shin laceration sustained 12 hours ago.  This is a non-work related injury.    Mechanism of injury: tripped into wood table at 0100 last night, 12 hours ago, slicing her shin  She does have baseline red swollen tight legs and is prone to cellulitis. Denies hitting her head. On aspirin.    Associated symptoms: Denies numbness, weakness, or loss of function  Last tetanus booster within 10 years: 04/26/2012    EXAM:   The patient appears today in alert,no apparent distress  VITALS: BP (!) 156/90   Pulse 84   Temp 97.8  F (36.6  C) (Temporal)   Ht 1.6 m (5' 3\")   Wt 102.1 kg (225 lb)   SpO2 97%   BMI 39.86 kg/m      Size of laceration: 5x4.5 centimeter V shaped deep gaping laceration that is not able to approximate, tissue is drying up and hardened  Characteristics of the laceration: bleeding- mild, deep, gaping, v shaped, skin tear  Tendon function intact: yes  Sensation to light touch intact: yes  Pulses intact: yes    Assessment:     Laceration of right lower extremity, initial encounter  Venous stasis dermatitis of right lower extremity  Need for diphtheria-tetanus-pertussis (Tdap) vaccine    PLAN:  PROCEDURE NOTE:  Wound cleaned with HIBICLENS  Wound cleaned with sterile water  Wound soaked  Wound irrigated  Gelfoam pressure dressing applied    After care instructions:  Open laceration to right lower extremity  Shared decision making on Gelfoam and pressure dressing in  12 hours have passed since laceration and the wound is impossible to approximate with stitches  Leave the dressing in place for several days and then change as needed thereafter  Let the Gelfoam inside slough off as it is ready  Thereafter can dab with lukewarm soapy water bacitracin zinc and a new " bandage daily  Reevaluation if fever odor or purulent discharge increasing pain worsening  Tdap today as last done 04/26/2012

## 2023-08-26 ENCOUNTER — APPOINTMENT (OUTPATIENT)
Dept: GENERAL RADIOLOGY | Facility: CLINIC | Age: 83
End: 2023-08-26
Attending: EMERGENCY MEDICINE
Payer: COMMERCIAL

## 2023-08-26 ENCOUNTER — HOSPITAL ENCOUNTER (EMERGENCY)
Facility: CLINIC | Age: 83
Discharge: NURSING, REHAB OR LONG-TERM CARE | End: 2023-08-26
Attending: EMERGENCY MEDICINE | Admitting: EMERGENCY MEDICINE
Payer: COMMERCIAL

## 2023-08-26 ENCOUNTER — APPOINTMENT (OUTPATIENT)
Dept: ULTRASOUND IMAGING | Facility: CLINIC | Age: 83
End: 2023-08-26
Attending: EMERGENCY MEDICINE
Payer: COMMERCIAL

## 2023-08-26 VITALS
HEART RATE: 80 BPM | BODY MASS INDEX: 38.62 KG/M2 | WEIGHT: 218 LBS | DIASTOLIC BLOOD PRESSURE: 84 MMHG | HEIGHT: 63 IN | RESPIRATION RATE: 15 BRPM | OXYGEN SATURATION: 96 % | TEMPERATURE: 98.2 F | SYSTOLIC BLOOD PRESSURE: 140 MMHG

## 2023-08-26 DIAGNOSIS — J44.9 CHRONIC OBSTRUCTIVE PULMONARY DISEASE, UNSPECIFIED COPD TYPE (H): ICD-10-CM

## 2023-08-26 DIAGNOSIS — R60.0 PERIPHERAL EDEMA: ICD-10-CM

## 2023-08-26 DIAGNOSIS — R06.00 DYSPNEA, UNSPECIFIED TYPE: ICD-10-CM

## 2023-08-26 LAB
ALBUMIN SERPL BCG-MCNC: 3.9 G/DL (ref 3.5–5.2)
ALBUMIN UR-MCNC: NEGATIVE MG/DL
ALP SERPL-CCNC: 95 U/L (ref 35–104)
ALT SERPL W P-5'-P-CCNC: 11 U/L (ref 0–50)
ANION GAP SERPL CALCULATED.3IONS-SCNC: 11 MMOL/L (ref 7–15)
APPEARANCE UR: CLEAR
AST SERPL W P-5'-P-CCNC: 21 U/L (ref 0–45)
BACTERIA #/AREA URNS HPF: ABNORMAL /HPF
BASOPHILS # BLD AUTO: 0 10E3/UL (ref 0–0.2)
BASOPHILS NFR BLD AUTO: 0 %
BILIRUB SERPL-MCNC: 0.4 MG/DL
BILIRUB UR QL STRIP: NEGATIVE
BUN SERPL-MCNC: 17.9 MG/DL (ref 8–23)
CALCIUM SERPL-MCNC: 9 MG/DL (ref 8.8–10.2)
CHLORIDE SERPL-SCNC: 102 MMOL/L (ref 98–107)
COLOR UR AUTO: ABNORMAL
CREAT SERPL-MCNC: 0.78 MG/DL (ref 0.51–0.95)
DEPRECATED HCO3 PLAS-SCNC: 28 MMOL/L (ref 22–29)
EOSINOPHIL # BLD AUTO: 0.2 10E3/UL (ref 0–0.7)
EOSINOPHIL NFR BLD AUTO: 3 %
ERYTHROCYTE [DISTWIDTH] IN BLOOD BY AUTOMATED COUNT: 13.9 % (ref 10–15)
FLUAV RNA SPEC QL NAA+PROBE: NEGATIVE
FLUBV RNA RESP QL NAA+PROBE: NEGATIVE
GFR SERPL CREATININE-BSD FRML MDRD: 75 ML/MIN/1.73M2
GLUCOSE SERPL-MCNC: 99 MG/DL (ref 70–99)
GLUCOSE UR STRIP-MCNC: NEGATIVE MG/DL
HCT VFR BLD AUTO: 39.7 % (ref 35–47)
HGB BLD-MCNC: 12 G/DL (ref 11.7–15.7)
HGB UR QL STRIP: NEGATIVE
HOLD SPECIMEN: NORMAL
IMM GRANULOCYTES # BLD: 0 10E3/UL
IMM GRANULOCYTES NFR BLD: 0 %
KETONES UR STRIP-MCNC: NEGATIVE MG/DL
LEUKOCYTE ESTERASE UR QL STRIP: ABNORMAL
LYMPHOCYTES # BLD AUTO: 1.1 10E3/UL (ref 0.8–5.3)
LYMPHOCYTES NFR BLD AUTO: 16 %
MCH RBC QN AUTO: 27.2 PG (ref 26.5–33)
MCHC RBC AUTO-ENTMCNC: 30.2 G/DL (ref 31.5–36.5)
MCV RBC AUTO: 90 FL (ref 78–100)
MONOCYTES # BLD AUTO: 0.6 10E3/UL (ref 0–1.3)
MONOCYTES NFR BLD AUTO: 9 %
NEUTROPHILS # BLD AUTO: 4.8 10E3/UL (ref 1.6–8.3)
NEUTROPHILS NFR BLD AUTO: 72 %
NITRATE UR QL: POSITIVE
NRBC # BLD AUTO: 0 10E3/UL
NRBC BLD AUTO-RTO: 0 /100
NT-PROBNP SERPL-MCNC: 1196 PG/ML (ref 0–1800)
PH UR STRIP: 6.5 [PH] (ref 5–7)
PLATELET # BLD AUTO: 243 10E3/UL (ref 150–450)
POTASSIUM SERPL-SCNC: 3.8 MMOL/L (ref 3.4–5.3)
PROT SERPL-MCNC: 6.5 G/DL (ref 6.4–8.3)
RBC # BLD AUTO: 4.41 10E6/UL (ref 3.8–5.2)
RBC URINE: <1 /HPF
RSV RNA SPEC NAA+PROBE: NEGATIVE
SARS-COV-2 RNA RESP QL NAA+PROBE: NEGATIVE
SODIUM SERPL-SCNC: 141 MMOL/L (ref 136–145)
SP GR UR STRIP: 1.01 (ref 1–1.03)
SQUAMOUS EPITHELIAL: 2 /HPF
TRANSITIONAL EPI: <1 /HPF
TROPONIN T SERPL HS-MCNC: 13 NG/L
TROPONIN T SERPL HS-MCNC: 13 NG/L
TSH SERPL DL<=0.005 MIU/L-ACNC: 1.59 UIU/ML (ref 0.3–4.2)
UROBILINOGEN UR STRIP-MCNC: NORMAL MG/DL
WBC # BLD AUTO: 6.7 10E3/UL (ref 4–11)
WBC URINE: 16 /HPF

## 2023-08-26 PROCEDURE — 83880 ASSAY OF NATRIURETIC PEPTIDE: CPT | Performed by: EMERGENCY MEDICINE

## 2023-08-26 PROCEDURE — 93010 ELECTROCARDIOGRAM REPORT: CPT | Performed by: EMERGENCY MEDICINE

## 2023-08-26 PROCEDURE — 85025 COMPLETE CBC W/AUTO DIFF WBC: CPT | Performed by: EMERGENCY MEDICINE

## 2023-08-26 PROCEDURE — 80053 COMPREHEN METABOLIC PANEL: CPT | Performed by: EMERGENCY MEDICINE

## 2023-08-26 PROCEDURE — 87186 SC STD MICRODIL/AGAR DIL: CPT | Performed by: EMERGENCY MEDICINE

## 2023-08-26 PROCEDURE — 250N000011 HC RX IP 250 OP 636: Mod: JZ | Performed by: EMERGENCY MEDICINE

## 2023-08-26 PROCEDURE — 71046 X-RAY EXAM CHEST 2 VIEWS: CPT | Mod: 26 | Performed by: RADIOLOGY

## 2023-08-26 PROCEDURE — 93970 EXTREMITY STUDY: CPT

## 2023-08-26 PROCEDURE — 96374 THER/PROPH/DIAG INJ IV PUSH: CPT | Performed by: EMERGENCY MEDICINE

## 2023-08-26 PROCEDURE — 99284 EMERGENCY DEPT VISIT MOD MDM: CPT | Mod: 25 | Performed by: EMERGENCY MEDICINE

## 2023-08-26 PROCEDURE — 87637 SARSCOV2&INF A&B&RSV AMP PRB: CPT | Performed by: EMERGENCY MEDICINE

## 2023-08-26 PROCEDURE — 81001 URINALYSIS AUTO W/SCOPE: CPT | Performed by: EMERGENCY MEDICINE

## 2023-08-26 PROCEDURE — 93005 ELECTROCARDIOGRAM TRACING: CPT | Performed by: EMERGENCY MEDICINE

## 2023-08-26 PROCEDURE — 93970 EXTREMITY STUDY: CPT | Mod: 26 | Performed by: RADIOLOGY

## 2023-08-26 PROCEDURE — 71046 X-RAY EXAM CHEST 2 VIEWS: CPT

## 2023-08-26 PROCEDURE — 36415 COLL VENOUS BLD VENIPUNCTURE: CPT | Performed by: EMERGENCY MEDICINE

## 2023-08-26 PROCEDURE — 99285 EMERGENCY DEPT VISIT HI MDM: CPT | Mod: 25 | Performed by: EMERGENCY MEDICINE

## 2023-08-26 PROCEDURE — 84443 ASSAY THYROID STIM HORMONE: CPT | Performed by: EMERGENCY MEDICINE

## 2023-08-26 PROCEDURE — 84484 ASSAY OF TROPONIN QUANT: CPT | Performed by: EMERGENCY MEDICINE

## 2023-08-26 RX ORDER — FUROSEMIDE 10 MG/ML
20 INJECTION INTRAMUSCULAR; INTRAVENOUS ONCE
Status: COMPLETED | OUTPATIENT
Start: 2023-08-26 | End: 2023-08-26

## 2023-08-26 RX ADMIN — FUROSEMIDE 20 MG: 10 INJECTION, SOLUTION INTRAVENOUS at 15:30

## 2023-08-26 ASSESSMENT — ACTIVITIES OF DAILY LIVING (ADL)
ADLS_ACUITY_SCORE: 35
ADLS_ACUITY_SCORE: 35

## 2023-08-26 NOTE — ED TRIAGE NOTES
"    Pt BIBA from an assisted living.Pt have been having SOB and chest pain for the past couple of days.Today chest pains mostly in the mid sternum.Aspirin 324mg given.Chest pain had  subsided when EMS arrived.EKG-SR.Pt also stated\" It was mostly shortness of breath and pain in my arms\"    "

## 2023-08-26 NOTE — DISCHARGE INSTRUCTIONS
Continue current medications.  Use your inhaler as needed for wheezing and shortness of breath.    Follow-up with your primary care clinic next week for recheck and further management of your medications as needed.    Return to the emergency department if worsening symptoms or other concerns.  
no

## 2023-08-26 NOTE — ED PROVIDER NOTES
ED Provider Note  [facility]      History     Chief Complaint   Patient presents with    Chest Pain     HPI  Jessica Ramsey is a 82 year old female who presents to the emergency department for a week symptoms of elevated blood pressure, generalized weakness, and dyspnea on exertion.  Patient states that she has had the aforementioned symptoms for approximately a week.  She called EMS today after a brief episode of chest pain that had resolved by the time EMS arrived.  Patient states that she has primarily been having aching in her upper arms and legs as well that has not been exertion related.  She reports a cough has been nonproductive.  She denies any fever.  She is on Lasix for peripheral edema but notes she has had increased lower extremity swelling.  She states that her left leg does not come down with the use of her compression stockings as much is the right one.  She states that has been an issue for the past 2 weeks.  She states her dose of Lasix has been stable and she has been compliant with her medications.  Patient states that she has a history of COPD and has been using her inhaler when short of air.  Patient states her symptoms improved with that.  Patient was treated for an E. coli UTI in the Health system on 7/26/2023.  She states those symptoms have resolved.  The E. coli was pansensitive according to urine culture.    Past Medical History  Past Medical History:   Diagnosis Date    Arthritis     Asthma     Degenerative joint disease     Depression     Hoarseness     Hypertension     Indigestion     Nasal congestion     Night sweats     Nonrheumatic aortic valve stenosis 8/14/2019    Numbness     Pneumonia     Ringing in ears     Sleep problems     Snoring     Sore throat     Trouble swallowing     Unspecified cerebral artery occlusion with cerebral infarction     Weakness     Weight gain      Past Surgical History:   Procedure Laterality Date    BACK SURGERY       "HYSTERECTOMY      ORTHOPEDIC SURGERY      NC SPINE SURGERY PROCEDURE UNLISTED      ZZC STOMACH SURGERY PROCEDURE UNLISTED       acetaminophen (TYLENOL) 650 MG CR tablet  albuterol (2.5 MG/3ML) 0.083% nebulizer solution  albuterol (ACCUNEB) 0.63 MG/3ML nebulizer solution  albuterol (PROAIR HFA/PROVENTIL HFA/VENTOLIN HFA) 108 (90 Base) MCG/ACT inhaler  aspirin 325 MG tablet  atorvastatin (LIPITOR) 40 MG tablet  benzonatate (TESSALON) 100 MG capsule  CALCIUM & MAGNESIUM CARBONATES PO  cephALEXin (KEFLEX) 500 MG capsule  Cholecalciferol (VITAMIN D) 2000 units tablet  ciprofloxacin (CIPRO) 500 MG tablet  COENZYME Q-10 PO  Escitalopram Oxalate (LEXAPRO PO)  furosemide (LASIX) 20 MG tablet  GABAPENTIN PO  losartan (COZAAR) 25 MG tablet  metoprolol succinate ER (TOPROL-XL) 25 MG 24 hr tablet  Multiple Vitamins-Minerals (PRESERVISION AREDS PO)  omeprazole (PRILOSEC) 20 MG capsule  ORDER FOR DME  phenazopyridine (PYRIDIUM) 200 MG tablet  pramipexole (MIRAPEX) 0.5 MG tablet  Sertraline HCl (ZOLOFT PO)  simvastatin (ZOCOR) 40 MG tablet  Tiotropium Bromide Monohydrate (SPIRIVA HANDIHALER IN)  traZODone (DESYREL) 100 MG tablet  triamcinolone (KENALOG) 0.1 % external cream  vitamin D (ERGOCALCIFEROL) 01391 UNIT capsule      Allergies   Allergen Reactions    Lyrica [Pregabalin]      \"felt like I was high and wanted to sleep a lot and did not help with the pain.\"    Malic Acid Other (See Comments)     Reaction: stomach cramps    Niacin      Rapid heartbeat    Seafood     Sulfa Antibiotics      Unknown    Tramadol      Dizziness     Azithromycin Rash    Naproxen Rash     Patient reported    Sulfamethoxazole-Trimethoprim Rash     Family History  Family History   Problem Relation Age of Onset    Asthma Father     Cerebrovascular Disease Father     Obesity Father     Low Back Problems Other      Social History   Social History     Tobacco Use    Smoking status: Former     Packs/day: 1.00     Years: 18.00     Pack years: 18.00     Types: " "Cigarettes    Smokeless tobacco: Never   Substance Use Topics    Alcohol use: No    Drug use: No         A medically appropriate review of systems was performed with pertinent positives and negatives noted in the HPI, and all other systems negative.    Physical Exam   BP: (!) 141/65  Pulse: 86  Temp: 98.1  F (36.7  C)  Resp: 12  Height: 160 cm (5' 3\")  Weight: 98.9 kg (218 lb)  SpO2: 97 %  Physical Exam  Vitals and nursing note reviewed.   Constitutional:       General: She is not in acute distress.     Appearance: She is well-developed. She is not diaphoretic.   HENT:      Head: Normocephalic and atraumatic.   Eyes:      Extraocular Movements: Extraocular movements intact.      Conjunctiva/sclera: Conjunctivae normal.   Cardiovascular:      Rate and Rhythm: Normal rate and regular rhythm.      Heart sounds: Normal heart sounds.   Pulmonary:      Effort: Pulmonary effort is normal. No respiratory distress.      Breath sounds: Normal breath sounds.   Abdominal:      Palpations: Abdomen is soft.      Tenderness: There is no abdominal tenderness.   Musculoskeletal:         General: No tenderness. Normal range of motion.      Cervical back: Normal range of motion and neck supple.      Right lower leg: Edema present.      Left lower leg: Edema present.   Skin:     General: Skin is warm and dry.      Findings: No rash.   Neurological:      General: No focal deficit present.      Mental Status: She is alert.           ED Course, Procedures, & Data      Procedures  Reviewed echocardiogram in [facility] from 1/16/2023 which revealed stable, mild aortic stenosis and an EF of 60%.  That was stable compared to a study from 2019.            EKG Interpretation:      Interpreted by EFRAÍN BALES MD, MD  Time reviewed: 1205  Symptoms at time of EKG: None   Rhythm: normal sinus   Rate: 84  Axis: Normal  Ectopy: none  Conduction: normal  ST Segments/ T Waves: No acute ischemic changes.  Poor R wave progression.  Q Waves: " v1  Comparison to prior: Unchanged from 8/26/2023    Clinical Impression: no acute changes     Results for orders placed or performed during the hospital encounter of 08/26/23   XR Chest 2 Views     Status: None    Narrative    EXAM:  XR CHEST 2 VIEWS    INDICATION: SOA    COMPARISON:  Chest x-ray 4/5/2023    FINDINGS:  PA and lateral views of the chest.    Cardiomediastinal silhouette within normal limits.  Perihilar  opacities. No pneumothorax.  No pleural effusion.       Impression    IMPRESSION:  Mild bilateral pulmonary edema.    I have personally reviewed the examination and initial interpretation  and I agree with the findings.    FEI COLLINS MD         SYSTEM ID:  [id number]   US Lower Extremity Venous Duplex Bilateral     Status: None    Narrative    EXAMINATION: DOPPLER VENOUS ULTRASOUND OF BILATERAL LOWER EXTREMITIES,  8/26/2023 12:56 PM     COMPARISON: None.    HISTORY: Swelling, evaluate for DVT.    TECHNIQUE:  Gray-scale evaluation with compression, spectral flow and  color Doppler assessment of the deep venous system of both legs from  groin to knee, and then at the ankles.    FINDINGS:  Right: the common femoral, femoral, popliteal and posterior tibial  veins demonstrate normal compressibility and blood flow.    Left: the common femoral, femoral, popliteal and posterior tibial  veins demonstrate normal compressibility and blood flow.    Small simple fluid collection in the right popliteal fossa measuring  4.6 x 2.7 x 1 cm.      Impression    IMPRESSION:  1.  No evidence of deep venous thrombosis in either lower extremity.  2.  Incidentally noted 4.6 cm simple fluid collection within the right  popliteal fossa.     I have personally reviewed the examination and initial interpretation  and I agree with the findings.    FEI COLLINS MD         SYSTEM ID:  [id number]   [place] Draw     Status: None    Narrative    The following orders were created for panel order [place] Draw.  Procedure                                Abnormality         Status                     ---------                               -----------         ------                     Extra Blue Top Tube[[id number]]                              Final result               Extra Red Top Tube[[id number]]                               Final result               Extra Green Top (Lithium...[[id number]]                      Final result               Extra Purple Top Tube[[id number]]                            Final result                 Please view results for these tests on the individual orders.   Extra Blue Top Tube     Status: None   Result Value Ref Range    Hold Specimen JIC    Extra Red Top Tube     Status: None   Result Value Ref Range    Hold Specimen JIC    Extra Green Top (Lithium Heparin) Tube     Status: None   Result Value Ref Range    Hold Specimen JIC    Extra Purple Top Tube     Status: None   Result Value Ref Range    Hold Specimen JIC    Comprehensive metabolic panel     Status: Normal   Result Value Ref Range    Sodium 141 136 - 145 mmol/L    Potassium 3.8 3.4 - 5.3 mmol/L    Chloride 102 98 - 107 mmol/L    Carbon Dioxide (CO2) 28 22 - 29 mmol/L    Anion Gap 11 7 - 15 mmol/L    Urea Nitrogen 17.9 8.0 - 23.0 mg/dL    Creatinine 0.78 0.51 - 0.95 mg/dL    Calcium 9.0 8.8 - 10.2 mg/dL    Glucose 99 70 - 99 mg/dL    Alkaline Phosphatase 95 35 - 104 U/L    AST 21 0 - 45 U/L    ALT 11 0 - 50 U/L    Protein Total 6.5 6.4 - 8.3 g/dL    Albumin 3.9 3.5 - 5.2 g/dL    Bilirubin Total 0.4 <=1.2 mg/dL    GFR Estimate 75 >60 mL/min/1.73m2   Troponin T, High Sensitivity     Status: Normal   Result Value Ref Range    Troponin T, High Sensitivity 13 <=14 ng/L   TSH with free T4 reflex     Status: Normal   Result Value Ref Range    TSH 1.59 0.30 - 4.20 uIU/mL   Nt probnp inpatient (BNP)     Status: Normal   Result Value Ref Range    N terminal Pro BNP Inpatient 1,196 0 - 1,800 pg/mL   UA with Microscopic reflex to Culture     Status: Abnormal     Specimen: Urine, Clean Catch   Result Value Ref Range    Color Urine Light Yellow Colorless, Straw, Light Yellow, Yellow    Appearance Urine Clear Clear    Glucose Urine Negative Negative mg/dL    Bilirubin Urine Negative Negative    Ketones Urine Negative Negative mg/dL    Specific Gravity Urine 1.013 1.003 - 1.035    Blood Urine Negative Negative    pH Urine 6.5 5.0 - 7.0    Protein Albumin Urine Negative Negative mg/dL    Urobilinogen Urine Normal Normal, 2.0 mg/dL    Nitrite Urine Positive (A) Negative    Leukocyte Esterase Urine Moderate (A) Negative    Bacteria Urine Few (A) None Seen /HPF    RBC Urine <1 <=2 /HPF    WBC Urine 16 (H) <=5 /HPF    Squamous Epithelials Urine 2 (H) <=1 /HPF    Transitional Epithelials Urine <1 <=1 /HPF    Narrative    Urine Culture ordered based on laboratory criteria   Symptomatic Influenza A/B, RSV, & SARS-CoV2 PCR (COVID-19) Nose     Status: Normal    Specimen: Nose; Swab   Result Value Ref Range    Influenza A PCR Negative Negative    Influenza B PCR Negative Negative    RSV PCR Negative Negative    SARS CoV2 PCR Negative Negative    Narrative    Testing was performed using the Xpert Xpress CoV2/Flu/RSV Assay on the [employer] GeneXpert Instrument. This test should be ordered for the detection of SARS-CoV-2, influenza, and RSV viruses in individuals who meet clinical and/or epidemiological criteria. Test performance is unknown in asymptomatic patients. This test is for in vitro diagnostic use under the FDA EUA for laboratories certified under CLIA to perform high or moderate complexity testing. This test has not been FDA cleared or approved. A negative result does not rule out the presence of PCR inhibitors in the specimen or target RNA in concentration below the limit of detection for the assay. If only one viral target is positive but coinfection with multiple targets is suspected, the sample should be re-tested with another FDA cleared, approved, or authorized test, if  coinfection would change clinical management. This test was validated by the [facility] [employer]. These laboratories are certified under the Clinical Laboratory Improvement Amendments of 1988 (CLIA-88) as qualified to perform high complexity laboratory testing.   CBC with platelets and differential     Status: Abnormal   Result Value Ref Range    WBC Count 6.7 4.0 - 11.0 10e3/uL    RBC Count 4.41 3.80 - 5.20 10e6/uL    Hemoglobin 12.0 11.7 - 15.7 g/dL    Hematocrit 39.7 35.0 - 47.0 %    MCV 90 78 - 100 fL    MCH 27.2 26.5 - 33.0 pg    MCHC 30.2 (L) 31.5 - 36.5 g/dL    RDW 13.9 10.0 - 15.0 %    Platelet Count 243 150 - 450 10e3/uL    % Neutrophils 72 %    % Lymphocytes 16 %    % Monocytes 9 %    % Eosinophils 3 %    % Basophils 0 %    % Immature Granulocytes 0 %    NRBCs per 100 WBC 0 <1 /100    Absolute Neutrophils 4.8 1.6 - 8.3 10e3/uL    Absolute Lymphocytes 1.1 0.8 - 5.3 10e3/uL    Absolute Monocytes 0.6 0.0 - 1.3 10e3/uL    Absolute Eosinophils 0.2 0.0 - 0.7 10e3/uL    Absolute Basophils 0.0 0.0 - 0.2 10e3/uL    Absolute Immature Granulocytes 0.0 <=0.4 10e3/uL    Absolute NRBCs 0.0 10e3/uL   Troponin T, High Sensitivity     Status: Normal   Result Value Ref Range    Troponin T, High Sensitivity 13 <=14 ng/L   EKG 12-lead, tracing only     Status: None (Preliminary result)   Result Value Ref Range    Systolic Blood Pressure  mmHg    Diastolic Blood Pressure  mmHg    Ventricular Rate 84 BPM    Atrial Rate 84 BPM    OH Interval 156 ms    QRS Duration 82 ms     ms    QTc 441 ms    P Axis 55 degrees    R AXIS -19 degrees    T Axis 3 degrees    Interpretation ECG       Sinus rhythm with Premature atrial complexes  Minimal voltage criteria for LVH, may be normal variant  Possible Anterior infarct , age undetermined  Abnormal ECG     CBC with platelets differential     Status: Abnormal    Narrative    The following orders were created for panel order CBC with platelets differential.  Procedure                                Abnormality         Status                     ---------                               -----------         ------                     CBC with platelets and d...[[id number]]  Abnormal            Final result                 Please view results for these tests on the individual orders.        Results for orders placed or performed during the hospital encounter of 08/26/23   XR Chest 2 Views     Status: None    Narrative    EXAM:  XR CHEST 2 VIEWS    INDICATION: SOA    COMPARISON:  Chest x-ray 4/5/2023    FINDINGS:  PA and lateral views of the chest.    Cardiomediastinal silhouette within normal limits.  Perihilar  opacities. No pneumothorax.  No pleural effusion.       Impression    IMPRESSION:  Mild bilateral pulmonary edema.    I have personally reviewed the examination and initial interpretation  and I agree with the findings.    FEI COLLINS MD         SYSTEM ID:  [id number]    Lower Extremity Venous Duplex Bilateral     Status: None    Narrative    EXAMINATION: DOPPLER VENOUS ULTRASOUND OF BILATERAL LOWER EXTREMITIES,  8/26/2023 12:56 PM     COMPARISON: None.    HISTORY: Swelling, evaluate for DVT.    TECHNIQUE:  Gray-scale evaluation with compression, spectral flow and  color Doppler assessment of the deep venous system of both legs from  groin to knee, and then at the ankles.    FINDINGS:  Right: the common femoral, femoral, popliteal and posterior tibial  veins demonstrate normal compressibility and blood flow.    Left: the common femoral, femoral, popliteal and posterior tibial  veins demonstrate normal compressibility and blood flow.    Small simple fluid collection in the right popliteal fossa measuring  4.6 x 2.7 x 1 cm.      Impression    IMPRESSION:  1.  No evidence of deep venous thrombosis in either lower extremity.  2.  Incidentally noted 4.6 cm simple fluid collection within the right  popliteal fossa.     I have personally reviewed the examination and initial  interpretation  and I agree with the findings.    FEI COLLINS MD         SYSTEM ID:  [id number]   [place] Draw     Status: None    Narrative    The following orders were created for panel order [place] Draw.  Procedure                               Abnormality         Status                     ---------                               -----------         ------                     Extra Blue Top Tube[[id number]]                              Final result               Extra Red Top Tube[[id number]]                               Final result               Extra Green Top (Lithium...[[id number]]                      Final result               Extra Purple Top Tube[[id number]]                            Final result                 Please view results for these tests on the individual orders.   Extra Blue Top Tube     Status: None   Result Value Ref Range    Hold Specimen JIC    Extra Red Top Tube     Status: None   Result Value Ref Range    Hold Specimen JIC    Extra Green Top (Lithium Heparin) Tube     Status: None   Result Value Ref Range    Hold Specimen JIC    Extra Purple Top Tube     Status: None   Result Value Ref Range    Hold Specimen JIC    Comprehensive metabolic panel     Status: Normal   Result Value Ref Range    Sodium 141 136 - 145 mmol/L    Potassium 3.8 3.4 - 5.3 mmol/L    Chloride 102 98 - 107 mmol/L    Carbon Dioxide (CO2) 28 22 - 29 mmol/L    Anion Gap 11 7 - 15 mmol/L    Urea Nitrogen 17.9 8.0 - 23.0 mg/dL    Creatinine 0.78 0.51 - 0.95 mg/dL    Calcium 9.0 8.8 - 10.2 mg/dL    Glucose 99 70 - 99 mg/dL    Alkaline Phosphatase 95 35 - 104 U/L    AST 21 0 - 45 U/L    ALT 11 0 - 50 U/L    Protein Total 6.5 6.4 - 8.3 g/dL    Albumin 3.9 3.5 - 5.2 g/dL    Bilirubin Total 0.4 <=1.2 mg/dL    GFR Estimate 75 >60 mL/min/1.73m2   Troponin T, High Sensitivity     Status: Normal   Result Value Ref Range    Troponin T, High Sensitivity 13 <=14 ng/L   TSH with free T4 reflex     Status: Normal   Result Value Ref  Range    TSH 1.59 0.30 - 4.20 uIU/mL   Nt probnp inpatient (BNP)     Status: Normal   Result Value Ref Range    N terminal Pro BNP Inpatient 1,196 0 - 1,800 pg/mL   UA with Microscopic reflex to Culture     Status: Abnormal    Specimen: Urine, Clean Catch   Result Value Ref Range    Color Urine Light Yellow Colorless, Straw, Light Yellow, Yellow    Appearance Urine Clear Clear    Glucose Urine Negative Negative mg/dL    Bilirubin Urine Negative Negative    Ketones Urine Negative Negative mg/dL    Specific Gravity Urine 1.013 1.003 - 1.035    Blood Urine Negative Negative    pH Urine 6.5 5.0 - 7.0    Protein Albumin Urine Negative Negative mg/dL    Urobilinogen Urine Normal Normal, 2.0 mg/dL    Nitrite Urine Positive (A) Negative    Leukocyte Esterase Urine Moderate (A) Negative    Bacteria Urine Few (A) None Seen /HPF    RBC Urine <1 <=2 /HPF    WBC Urine 16 (H) <=5 /HPF    Squamous Epithelials Urine 2 (H) <=1 /HPF    Transitional Epithelials Urine <1 <=1 /HPF    Narrative    Urine Culture ordered based on laboratory criteria   Symptomatic Influenza A/B, RSV, & SARS-CoV2 PCR (COVID-19) Nose     Status: Normal    Specimen: Nose; Swab   Result Value Ref Range    Influenza A PCR Negative Negative    Influenza B PCR Negative Negative    RSV PCR Negative Negative    SARS CoV2 PCR Negative Negative    Narrative    Testing was performed using the Xpert Xpress CoV2/Flu/RSV Assay on the [employer] Gene[employer]pert Instrument. This test should be ordered for the detection of SARS-CoV-2, influenza, and RSV viruses in individuals who meet clinical and/or epidemiological criteria. Test performance is unknown in asymptomatic patients. This test is for in vitro diagnostic use under the FDA EUA for laboratories certified under CLIA to perform high or moderate complexity testing. This test has not been FDA cleared or approved. A negative result does not rule out the presence of PCR inhibitors in the specimen or target RNA in concentration  below the limit of detection for the assay. If only one viral target is positive but coinfection with multiple targets is suspected, the sample should be re-tested with another FDA cleared, approved, or authorized test, if coinfection would change clinical management. This test was validated by the [facility] [employer]. These laboratories are certified under the Clinical Laboratory Improvement Amendments of 1988 (CLIA-88) as qualified to perform high complexity laboratory testing.   CBC with platelets and differential     Status: Abnormal   Result Value Ref Range    WBC Count 6.7 4.0 - 11.0 10e3/uL    RBC Count 4.41 3.80 - 5.20 10e6/uL    Hemoglobin 12.0 11.7 - 15.7 g/dL    Hematocrit 39.7 35.0 - 47.0 %    MCV 90 78 - 100 fL    MCH 27.2 26.5 - 33.0 pg    MCHC 30.2 (L) 31.5 - 36.5 g/dL    RDW 13.9 10.0 - 15.0 %    Platelet Count 243 150 - 450 10e3/uL    % Neutrophils 72 %    % Lymphocytes 16 %    % Monocytes 9 %    % Eosinophils 3 %    % Basophils 0 %    % Immature Granulocytes 0 %    NRBCs per 100 WBC 0 <1 /100    Absolute Neutrophils 4.8 1.6 - 8.3 10e3/uL    Absolute Lymphocytes 1.1 0.8 - 5.3 10e3/uL    Absolute Monocytes 0.6 0.0 - 1.3 10e3/uL    Absolute Eosinophils 0.2 0.0 - 0.7 10e3/uL    Absolute Basophils 0.0 0.0 - 0.2 10e3/uL    Absolute Immature Granulocytes 0.0 <=0.4 10e3/uL    Absolute NRBCs 0.0 10e3/uL   Troponin T, High Sensitivity     Status: Normal   Result Value Ref Range    Troponin T, High Sensitivity 13 <=14 ng/L   EKG 12-lead, tracing only     Status: None (Preliminary result)   Result Value Ref Range    Systolic Blood Pressure  mmHg    Diastolic Blood Pressure  mmHg    Ventricular Rate 84 BPM    Atrial Rate 84 BPM    OR Interval 156 ms    QRS Duration 82 ms     ms    QTc 441 ms    P Axis 55 degrees    R AXIS -19 degrees    T Axis 3 degrees    Interpretation ECG       Sinus rhythm with Premature atrial complexes  Minimal voltage criteria for LVH, may be normal variant  Possible  Anterior infarct , age undetermined  Abnormal ECG     CBC with platelets differential     Status: Abnormal    Narrative    The following orders were created for panel order CBC with platelets differential.  Procedure                               Abnormality         Status                     ---------                               -----------         ------                     CBC with platelets and d...[[id number]]  Abnormal            Final result                 Please view results for these tests on the individual orders.     Medications   furosemide (LASIX) injection 20 mg (20 mg Intravenous $Given 8/26/23 1530)     Labs Ordered and Resulted from Time of ED Arrival to Time of ED Departure   ROUTINE UA WITH MICROSCOPIC REFLEX TO CULTURE - Abnormal       Result Value    Color Urine Light Yellow      Appearance Urine Clear      Glucose Urine Negative      Bilirubin Urine Negative      Ketones Urine Negative      Specific Gravity Urine 1.013      Blood Urine Negative      pH Urine 6.5      Protein Albumin Urine Negative      Urobilinogen Urine Normal      Nitrite Urine Positive (*)     Leukocyte Esterase Urine Moderate (*)     Bacteria Urine Few (*)     RBC Urine <1      WBC Urine 16 (*)     Squamous Epithelials Urine 2 (*)     Transitional Epithelials Urine <1     CBC WITH PLATELETS AND DIFFERENTIAL - Abnormal    WBC Count 6.7      RBC Count 4.41      Hemoglobin 12.0      Hematocrit 39.7      MCV 90      MCH 27.2      MCHC 30.2 (*)     RDW 13.9      Platelet Count 243      % Neutrophils 72      % Lymphocytes 16      % Monocytes 9      % Eosinophils 3      % Basophils 0      % Immature Granulocytes 0      NRBCs per 100 WBC 0      Absolute Neutrophils 4.8      Absolute Lymphocytes 1.1      Absolute Monocytes 0.6      Absolute Eosinophils 0.2      Absolute Basophils 0.0      Absolute Immature Granulocytes 0.0      Absolute NRBCs 0.0     COMPREHENSIVE METABOLIC PANEL - Normal    Sodium 141      Potassium 3.8       Chloride 102      Carbon Dioxide (CO2) 28      Anion Gap 11      Urea Nitrogen 17.9      Creatinine 0.78      Calcium 9.0      Glucose 99      Alkaline Phosphatase 95      AST 21      ALT 11      Protein Total 6.5      Albumin 3.9      Bilirubin Total 0.4      GFR Estimate 75     TROPONIN T, HIGH SENSITIVITY - Normal    Troponin T, High Sensitivity 13     TSH WITH FREE T4 REFLEX - Normal    TSH 1.59     NT PROBNP INPATIENT - Normal    N terminal Pro BNP Inpatient 1,196     INFLUENZA A/B, RSV, & SARS-COV2 PCR - Normal    Influenza A PCR Negative      Influenza B PCR Negative      RSV PCR Negative      SARS CoV2 PCR Negative     TROPONIN T, HIGH SENSITIVITY - Normal    Troponin T, High Sensitivity 13     URINE CULTURE     XR Chest 2 Views   Final Result   IMPRESSION:   Mild bilateral pulmonary edema.      I have personally reviewed the examination and initial interpretation   and I agree with the findings.      FEI COLLINS MD            SYSTEM ID:  [id number]      US Lower Extremity Venous Duplex Bilateral   Final Result   IMPRESSION:   1.  No evidence of deep venous thrombosis in either lower extremity.   2.  Incidentally noted 4.6 cm simple fluid collection within the right   popliteal fossa.       I have personally reviewed the examination and initial interpretation   and I agree with the findings.      FEI COLLINS MD            SYSTEM ID:  [id number]             Critical care was not performed.     Medical Decision Making  The patient's presentation was of moderate complexity (an undiagnosed new problem with uncertain diagnosis).    The patient's evaluation involved:  review of 1 test result(s) ordered prior to this encounter (see separate area of note for details)  ordering and/or review of 3+ test(s) in this encounter (see separate area of note for details)    The patient's management necessitated moderate risk (prescription drug management including medications given in the ED).    Assessment & Plan     82 year old female to the emergency department with 1 week history of shortness of breath, generalized weakness, and increased lower extremity edema.  Patient has a history of COPD and peripheral edema.  She is on bronchodilators and Lasix for her edema.  She also wears compression stockings which she is not wearing today.  She does feel that her left leg has not been improving with her compression stockings as it previously had.  Differential diagnosis includes ACS, heart failure, COPD exacerbation, pneumonia, thyroid dysfunction, COVID-19.  Low clinical suspicion for pulmonary embolism as patient is not hypoxic nor tachycardic here in the emergency department.  Ultrasounds performed and revealed no evidence for DVT further decreasing the likelihood of DVT.  The patient's EKG does not have any acute ischemic change and her troponin and delta troponin are both normal so do not suspect ACS.  Chest radiograph does not reveal any pneumonia nor pneumothorax so do not suspect those etiologies.  The patient's TSH is normal so do not suspect thyroid dysfunction.  She has no wheezing here in the emergency department, is breathing comfortably, and has noted improvement in her dyspnea with use of bronchodilators during the week.  Suspect the patient's dyspnea and cough are related to a COPD exacerbation.  She has not had a fever no increased sputum production to do not feel that antibiotics would be beneficial.  The patient's BNP is within normal range at 1200.  There is question of some mild pulmonary edema on her chest radiograph but no convincing evidence for heart failure so do not suspect acute onset heart failure.  Patient does feel that her lower extremities are more swollen.  She is on 20 mg of oral Lasix at home.  She was given 20 mg IV dose in the emergency department for her peripheral edema.  She is anxious to return back to her care facility for dinner.  She appears clinically well and appropriate for  outpatient management.  We will have her follow-up with her primary care clinic this week to recheck and adjust her medications as indicated.  Return precautions provided.    I have reviewed the nursing notes. I have reviewed the findings, diagnosis, plan and need for follow up with the patient.    New Prescriptions    No medications on file       Final diagnoses:   Peripheral edema   Dyspnea, unspecified type     Chart documentation was completed with Dragon voice-recognition software. Even though reviewed, this chart may still contain some grammatical, spelling, and word errors.     Luis Miguel Pringle Md    [facility] EMERGENCY DEPARTMENT  8/26/2023     Luis Miguel Pringle MD  08/26/23 9499

## 2023-08-27 ENCOUNTER — TELEPHONE (OUTPATIENT)
Dept: EMERGENCY MEDICINE | Facility: CLINIC | Age: 83
End: 2023-08-27
Payer: COMMERCIAL

## 2023-08-27 NOTE — RESULT ENCOUNTER NOTE
Preliminary urine culture report on 8/27/23 shows the presence of bacteria(s):  >100,000 CFU/ML Escherichia coli   Emergency Dept/Urgent Care discharge antibiotic: None  Recommendations per [facility] ED Lab result Urine culture protocol.

## 2023-08-27 NOTE — TELEPHONE ENCOUNTER
[facility] Emergency Department/Urgent Care Lab result notification  [Note:  ED Lab Results RN will reference the [facility] Emergency Dept visit note prior to contacting patient AND/OR prior to consulting Emergency Dept Provider.  Highlights of Emergency Dept visit in information summary at the bottom of this telephone note]    1. Reason for call  Notify of lab results  Assess patient symptoms [if necessary]  Review ED Providers recommendations/discharge instructions (if necessary)  Advise per [facility] ED lab result protocol    2. Lab Result (including Rx patient on, if applicable).  If culture, copy of lab report at bottom.  Preliminary urine culture report on 8/27/23 shows the presence of bacteria(s):  >100,000 CFU/ML Escherichia coli   Emergency Dept/Urgent Care discharge antibiotic: None  Recommendations per [facility] Lab result Urine culture protocol.    3. RN Assessment (Patient's current Symptoms):  Time of call: 2:24PM; left message    4. RN Recommendations/Instructions per [place] ED lab result protocol  [facility] ED lab result protocol used: urine culture  Left voicemail message requesting a call back to [facility] ED Lab Result RN at [phone].  RN is available every day between 9 a.m. and 5:30 p.m.    Information summary from Emergency Dept/Urgent Care visit on 8/26/23  Symptoms reported at ED/UC visit (Chief complaint, HPI)     Chief Complaint   Patient presents with    Chest Pain      HPI  Jessica Ramsey is a 82 year old female who presents to the emergency department for a week symptoms of elevated blood pressure, generalized weakness, and dyspnea on exertion.  Patient states that she has had the aforementioned symptoms for approximately a week.  She called EMS today after a brief episode of chest pain that had resolved by the time EMS arrived.  Patient states that she has primarily been having aching in her upper arms and legs as well that has not  "been exertion related.  She reports a cough has been nonproductive.  She denies any fever.  She is on Lasix for peripheral edema but notes she has had increased lower extremity swelling.  She states that her left leg does not come down with the use of her compression stockings as much is the right one.  She states that has been an issue for the past 2 weeks.  She states her dose of Lasix has been stable and she has been compliant with her medications.  Patient states that she has a history of COPD and has been using her inhaler when short of air.  Patient states her symptoms improved with that.  Patient was treated for an E. coli UTI in the [facility] on 7/26/2023.  She states those symptoms have resolved.  The E. coli was pansensitive according to urine culture.   Significant Medical hx, if applicable (i.e. CKD, diabetes) Reviewed   Allergies Allergies   Allergen Reactions    Lyrica [Pregabalin]      \"felt like I was high and wanted to sleep a lot and did not help with the pain.\"    Malic Acid Other (See Comments)     Reaction: stomach cramps    Niacin      Rapid heartbeat    Seafood     Sulfa Antibiotics      Unknown    Tramadol      Dizziness     Azithromycin Rash    Naproxen Rash     Patient reported    Sulfamethoxazole-Trimethoprim Rash      Weight, if applicable Wt Readings from Last 2 Encounters:   08/26/23 98.9 kg (218 lb)   06/27/23 102.1 kg (225 lb)      Coumadin/Warfarin [Yes /No] No   Creatinine Level (mg/dl) Creatinine   Date Value Ref Range Status   08/26/2023 0.78 0.51 - 0.95 mg/dL Final   06/12/2012 0.90 0.52 - 1.04 mg/dL Final      Creatinine clearance (ml/min), if applicable Serum creatinine: 0.78 mg/dL 08/26/23 1202  Estimated creatinine clearance: 61.3 mL/min   ED/UC Provider Impression and Plan (applicable information) Assessment & Plan    82 year old female to the emergency department with 1 week history of shortness of breath, generalized weakness, and increased lower extremity " edema.  Patient has a history of COPD and peripheral edema.  She is on bronchodilators and Lasix for her edema.  She also wears compression stockings which she is not wearing today.  She does feel that her left leg has not been improving with her compression stockings as it previously had.  Differential diagnosis includes ACS, heart failure, COPD exacerbation, pneumonia, thyroid dysfunction, COVID-19.  Low clinical suspicion for pulmonary embolism as patient is not hypoxic nor tachycardic here in the emergency department.  Ultrasounds performed and revealed no evidence for DVT further decreasing the likelihood of DVT.  The patient's EKG does not have any acute ischemic change and her troponin and delta troponin are both normal so do not suspect ACS.  Chest radiograph does not reveal any pneumonia nor pneumothorax so do not suspect those etiologies.  The patient's TSH is normal so do not suspect thyroid dysfunction.  She has no wheezing here in the emergency department, is breathing comfortably, and has noted improvement in her dyspnea with use of bronchodilators during the week.  Suspect the patient's dyspnea and cough are related to a COPD exacerbation.  She has not had a fever no increased sputum production to do not feel that antibiotics would be beneficial.  The patient's BNP is within normal range at 1200.  There is question of some mild pulmonary edema on her chest radiograph but no convincing evidence for heart failure so do not suspect acute onset heart failure.  Patient does feel that her lower extremities are more swollen.  She is on 20 mg of oral Lasix at home.  She was given 20 mg IV dose in the emergency department for her peripheral edema.  She is anxious to return back to her care facility for dinner.  She appears clinically well and appropriate for outpatient management.  We will have her follow-up with her primary care clinic this week to recheck and adjust her medications as indicated.  Return  precautions provided.   ED/UC diagnosis Peripheral edema   Dyspnea, unspecified type      ED/UC Provider Luis Miguel Pringle MD         Copy of Lab report (if applicable)        Manuel Xavier RN  [facility]  Emergency Dept Lab Result RN  Ph# [phone]

## 2023-08-27 NOTE — RESULT ENCOUNTER NOTE
Left voicemail message requesting a call back to [facility] ED Lab Result RN at [phone].  RN is available every day between 9 a.m. and 5:30 p.m.  See Telephone encounter.

## 2023-08-28 ENCOUNTER — DOCUMENTATION ONLY (OUTPATIENT)
Dept: OTHER | Facility: CLINIC | Age: 83
End: 2023-08-28
Payer: COMMERCIAL

## 2023-08-28 LAB
ATRIAL RATE - MUSE: 84 BPM
BACTERIA UR CULT: ABNORMAL
DIASTOLIC BLOOD PRESSURE - MUSE: NORMAL MMHG
INTERPRETATION ECG - MUSE: NORMAL
P AXIS - MUSE: 55 DEGREES
PR INTERVAL - MUSE: 156 MS
QRS DURATION - MUSE: 82 MS
QT - MUSE: 374 MS
QTC - MUSE: 441 MS
R AXIS - MUSE: -19 DEGREES
SYSTOLIC BLOOD PRESSURE - MUSE: NORMAL MMHG
T AXIS - MUSE: 3 DEGREES
VENTRICULAR RATE- MUSE: 84 BPM

## 2023-08-28 RX ORDER — CEFPODOXIME PROXETIL 100 MG/1
100 TABLET, FILM COATED ORAL 2 TIMES DAILY
Qty: 10 TABLET | Refills: 0 | Status: SHIPPED | OUTPATIENT
Start: 2023-08-28 | End: 2023-09-02

## 2023-08-28 NOTE — RESULT ENCOUNTER NOTE
Jessica was notified of lab result and treatment recommendations  Start or switch to Rx for Cefpodoxime (Vantin) 100 MG tablet, 1 tablet (100 mg) by mouth 2 times daily for 5 days  sent to [Pharmacy - [facility]].

## 2023-08-28 NOTE — RESULT ENCOUNTER NOTE
Final urine culture on 8/26/23 shows the presence of bacteria(s): >100,000 CFU/ML Escherichia coli   [facility] Emergency Dept discharge antibiotic: None  Recommendations in treatment per [facility] ED lab result Urine Culture protocol.

## 2023-08-28 NOTE — TELEPHONE ENCOUNTER
[facility] Emergency Department/Urgent Care Lab result notification  [Note:  ED Lab Results RN will reference the [facility] Emergency Dept visit note prior to contacting patient AND/OR prior to consulting Emergency Dept Provider.  Highlights of Emergency Dept visit in information summary at the bottom of this telephone note]    1. Reason for call  Notify of lab results  Assess patient symptoms [if necessary]  Review ED Providers recommendations/discharge instructions (if necessary)  Advise per [facility] ED lab result protocol    2. Lab Result (including Rx patient on, if applicable).  If culture, copy of lab report at bottom.  Final urine culture on 8/26/23 shows the presence of bacteria(s): >100,000 CFU/ML Escherichia coli   [facility] Emergency Dept discharge antibiotic: None  Recommendations in treatment per [facility] lab result Urine Culture protocol.    3. RN Assessment (Patient's current Symptoms):  Time of call: 12PM  Assessment:  Incontinent of urine for years.  No complaints of urinary discomfort or burning with urination    4. RN Recommendations/Instructions per [facility] ED lab result protocol  [facility] ED lab result protocol used: urine culture  Jessica was notified of lab result and treatment recommendations  Start or switch to Rx for Cefpodoxime (Vantin) 100 MG tablet, 1 tablet (100 mg) by mouth 2 times daily for 5 days  sent to [Pharmacy - [facility]      5. Please Contact your PCP clinic or return to the Emergency department if your:  Symptoms worsen or other concerning symptoms.    Information summary from Emergency Dept/Urgent Care visit on 8/26/23  Symptoms reported at ED/UC visit (Chief complaint, HPI)       Chief Complaint   Patient presents with    Chest Pain      HPI  Jessica Ramsey is a 82 year old female who presents to the emergency department for a week symptoms of elevated blood pressure, generalized weakness, and  "dyspnea on exertion.  Patient states that she has had the aforementioned symptoms for approximately a week.  She called EMS today after a brief episode of chest pain that had resolved by the time EMS arrived.  Patient states that she has primarily been having aching in her upper arms and legs as well that has not been exertion related.  She reports a cough has been nonproductive.  She denies any fever.  She is on Lasix for peripheral edema but notes she has had increased lower extremity swelling.  She states that her left leg does not come down with the use of her compression stockings as much is the right one.  She states that has been an issue for the past 2 weeks.  She states her dose of Lasix has been stable and she has been compliant with her medications.  Patient states that she has a history of COPD and has been using her inhaler when short of air.  Patient states her symptoms improved with that.  Patient was treated for an E. coli UTI in the [facility] on 7/26/2023.  She states those symptoms have resolved.  The E. coli was pansensitive according to urine culture.   Significant Medical hx, if applicable (i.e. CKD, diabetes) Reviewed   Allergies       Allergies   Allergen Reactions    Lyrica [Pregabalin]         \"felt like I was high and wanted to sleep a lot and did not help with the pain.\"    Malic Acid Other (See Comments)       Reaction: stomach cramps    Niacin         Rapid heartbeat    Seafood      Sulfa Antibiotics         Unknown    Tramadol         Dizziness     Azithromycin Rash    Naproxen Rash       Patient reported    Sulfamethoxazole-Trimethoprim Rash       Weight, if applicable     Wt Readings from Last 2 Encounters:   08/26/23 98.9 kg (218 lb)   06/27/23 102.1 kg (225 lb)       Coumadin/Warfarin [Yes /No] No   Creatinine Level (mg/dl)       Creatinine   Date Value Ref Range Status   08/26/2023 0.78 0.51 - 0.95 mg/dL Final   06/12/2012 0.90 0.52 - 1.04 mg/dL Final       Creatinine " clearance (ml/min), if applicable Serum creatinine: 0.78 mg/dL 08/26/23 1202  Estimated creatinine clearance: 61.3 mL/min   ED/ Provider Impression and Plan (applicable information) Assessment & Plan    82 year old female to the emergency department with 1 week history of shortness of breath, generalized weakness, and increased lower extremity edema.  Patient has a history of COPD and peripheral edema.  She is on bronchodilators and Lasix for her edema.  She also wears compression stockings which she is not wearing today.  She does feel that her left leg has not been improving with her compression stockings as it previously had.  Differential diagnosis includes ACS, heart failure, COPD exacerbation, pneumonia, thyroid dysfunction, COVID-19.  Low clinical suspicion for pulmonary embolism as patient is not hypoxic nor tachycardic here in the emergency department.  Ultrasounds performed and revealed no evidence for DVT further decreasing the likelihood of DVT.  The patient's EKG does not have any acute ischemic change and her troponin and delta troponin are both normal so do not suspect ACS.  Chest radiograph does not reveal any pneumonia nor pneumothorax so do not suspect those etiologies.  The patient's TSH is normal so do not suspect thyroid dysfunction.  She has no wheezing here in the emergency department, is breathing comfortably, and has noted improvement in her dyspnea with use of bronchodilators during the week.  Suspect the patient's dyspnea and cough are related to a COPD exacerbation.  She has not had a fever no increased sputum production to do not feel that antibiotics would be beneficial.  The patient's BNP is within normal range at 1200.  There is question of some mild pulmonary edema on her chest radiograph but no convincing evidence for heart failure so do not suspect acute onset heart failure.  Patient does feel that her lower extremities are more swollen.  She is on 20 mg of oral Lasix at home.   She was given 20 mg IV dose in the emergency department for her peripheral edema.  She is anxious to return back to her care facility for dinner.  She appears clinically well and appropriate for outpatient management.  We will have her follow-up with her primary care clinic this week to recheck and adjust her medications as indicated.  Return precautions provided.   ED/UC diagnosis Peripheral edema   Dyspnea, unspecified type      ED/UC Provider Luis Miguel Pringle MD         Copy of Lab report (if applicable)        Manuel Xavier RN  [facility]  Emergency Dept Lab Result RN  Ph# [phone]

## 2023-12-04 ENCOUNTER — APPOINTMENT (OUTPATIENT)
Dept: GENERAL RADIOLOGY | Facility: CLINIC | Age: 83
End: 2023-12-04
Attending: STUDENT IN AN ORGANIZED HEALTH CARE EDUCATION/TRAINING PROGRAM
Payer: COMMERCIAL

## 2023-12-04 ENCOUNTER — HOSPITAL ENCOUNTER (EMERGENCY)
Facility: CLINIC | Age: 83
Discharge: HOME | End: 2023-12-04
Attending: STUDENT IN AN ORGANIZED HEALTH CARE EDUCATION/TRAINING PROGRAM | Admitting: STUDENT IN AN ORGANIZED HEALTH CARE EDUCATION/TRAINING PROGRAM
Payer: COMMERCIAL

## 2023-12-04 VITALS
TEMPERATURE: 98.4 F | DIASTOLIC BLOOD PRESSURE: 89 MMHG | SYSTOLIC BLOOD PRESSURE: 151 MMHG | RESPIRATION RATE: 20 BRPM | HEART RATE: 91 BPM | OXYGEN SATURATION: 97 %

## 2023-12-04 DIAGNOSIS — R79.82 ELEVATED C-REACTIVE PROTEIN (CRP): Primary | ICD-10-CM

## 2023-12-04 DIAGNOSIS — M79.89 SWELLING OF RIGHT HAND: ICD-10-CM

## 2023-12-04 LAB
ANION GAP SERPL CALCULATED.3IONS-SCNC: 10 MMOL/L (ref 7–15)
BASOPHILS # BLD AUTO: 0 10E3/UL (ref 0–0.2)
BASOPHILS NFR BLD AUTO: 0 %
BUN SERPL-MCNC: 17.7 MG/DL (ref 8–23)
CALCIUM SERPL-MCNC: 8.9 MG/DL (ref 8.8–10.2)
CHLORIDE SERPL-SCNC: 105 MMOL/L (ref 98–107)
CREAT SERPL-MCNC: 0.72 MG/DL (ref 0.51–0.95)
CRP SERPL-MCNC: 15.9 MG/L
DEPRECATED HCO3 PLAS-SCNC: 24 MMOL/L (ref 22–29)
EGFRCR SERPLBLD CKD-EPI 2021: 83 ML/MIN/1.73M2
EOSINOPHIL # BLD AUTO: 0.2 10E3/UL (ref 0–0.7)
EOSINOPHIL NFR BLD AUTO: 2 %
ERYTHROCYTE [DISTWIDTH] IN BLOOD BY AUTOMATED COUNT: 13.9 % (ref 10–15)
ERYTHROCYTE [SEDIMENTATION RATE] IN BLOOD BY WESTERGREN METHOD: 33 MM/HR (ref 0–30)
GLUCOSE SERPL-MCNC: 148 MG/DL (ref 70–99)
HCT VFR BLD AUTO: 39.3 % (ref 35–47)
HGB BLD-MCNC: 11.9 G/DL (ref 11.7–15.7)
IMM GRANULOCYTES # BLD: 0 10E3/UL
IMM GRANULOCYTES NFR BLD: 0 %
LYMPHOCYTES # BLD AUTO: 1.1 10E3/UL (ref 0.8–5.3)
LYMPHOCYTES NFR BLD AUTO: 15 %
MCH RBC QN AUTO: 27.1 PG (ref 26.5–33)
MCHC RBC AUTO-ENTMCNC: 30.3 G/DL (ref 31.5–36.5)
MCV RBC AUTO: 90 FL (ref 78–100)
MONOCYTES # BLD AUTO: 0.6 10E3/UL (ref 0–1.3)
MONOCYTES NFR BLD AUTO: 8 %
NEUTROPHILS # BLD AUTO: 5.6 10E3/UL (ref 1.6–8.3)
NEUTROPHILS NFR BLD AUTO: 75 %
NRBC # BLD AUTO: 0 10E3/UL
NRBC BLD AUTO-RTO: 0 /100
PLATELET # BLD AUTO: 280 10E3/UL (ref 150–450)
POTASSIUM SERPL-SCNC: 4.2 MMOL/L (ref 3.4–5.3)
RBC # BLD AUTO: 4.39 10E6/UL (ref 3.8–5.2)
SODIUM SERPL-SCNC: 139 MMOL/L (ref 135–145)
WBC # BLD AUTO: 7.5 10E3/UL (ref 4–11)

## 2023-12-04 PROCEDURE — 99207 PR NO BILLABLE SERVICE THIS VISIT: CPT | Performed by: ORTHOPAEDIC SURGERY

## 2023-12-04 PROCEDURE — 86140 C-REACTIVE PROTEIN: CPT | Performed by: STUDENT IN AN ORGANIZED HEALTH CARE EDUCATION/TRAINING PROGRAM

## 2023-12-04 PROCEDURE — 73130 X-RAY EXAM OF HAND: CPT | Mod: 26 | Performed by: RADIOLOGY

## 2023-12-04 PROCEDURE — 36415 COLL VENOUS BLD VENIPUNCTURE: CPT | Performed by: STUDENT IN AN ORGANIZED HEALTH CARE EDUCATION/TRAINING PROGRAM

## 2023-12-04 PROCEDURE — 73130 X-RAY EXAM OF HAND: CPT | Mod: RT

## 2023-12-04 PROCEDURE — 80048 BASIC METABOLIC PNL TOTAL CA: CPT | Performed by: STUDENT IN AN ORGANIZED HEALTH CARE EDUCATION/TRAINING PROGRAM

## 2023-12-04 PROCEDURE — 85018 HEMOGLOBIN: CPT | Performed by: STUDENT IN AN ORGANIZED HEALTH CARE EDUCATION/TRAINING PROGRAM

## 2023-12-04 PROCEDURE — 99284 EMERGENCY DEPT VISIT MOD MDM: CPT | Performed by: STUDENT IN AN ORGANIZED HEALTH CARE EDUCATION/TRAINING PROGRAM

## 2023-12-04 PROCEDURE — 85652 RBC SED RATE AUTOMATED: CPT | Performed by: STUDENT IN AN ORGANIZED HEALTH CARE EDUCATION/TRAINING PROGRAM

## 2023-12-04 PROCEDURE — 85041 AUTOMATED RBC COUNT: CPT | Performed by: STUDENT IN AN ORGANIZED HEALTH CARE EDUCATION/TRAINING PROGRAM

## 2023-12-04 PROCEDURE — 99284 EMERGENCY DEPT VISIT MOD MDM: CPT

## 2023-12-04 RX ORDER — CEPHALEXIN 500 MG/1
500 CAPSULE ORAL 2 TIMES DAILY
Qty: 10 CAPSULE | Refills: 0 | Status: SHIPPED | OUTPATIENT
Start: 2023-12-04 | End: 2023-12-09

## 2023-12-04 ASSESSMENT — ACTIVITIES OF DAILY LIVING (ADL)
ADLS_ACUITY_SCORE: 35
ADLS_ACUITY_SCORE: 35

## 2023-12-04 NOTE — DISCHARGE INSTRUCTIONS
You were seen in the emergency department due to swelling in your hand.  Your lab work here that shows some inflammatory changes that could be concerning for possible infection within the joints of your hand.  You were evaluated here by the orthopedic surgeons, but did not want to wait for their final evaluation determination.    At this point in time, would recommend use Tylenol as needed for discomfort.  Please follow-up with your primary care doctor in the next 1 to 2 weeks ago continuing worsening pain.    Return the emergency department with severe worsening swelling, fevers, numbness, tingling or weakness in your hand or any other concerning symptoms

## 2023-12-04 NOTE — ED TRIAGE NOTES
Pt arrives ambulatory to ED w/ c/o right hand swelling - unable to open and close hand fully and unable to hold anything with that hand - started 2 weeks ago and has not gotten any better or worse.   Feels numbness and tingling in her hand - tingling radiates up right arm

## 2023-12-05 NOTE — CONSULTS
[facility]  Orthopedic Surgery Consult    Name: Jessica Ramsey  Age: 82 year old  MRN: [id number]  YOB: 1940    Reason for Consult: right hand swelling    Requesting Provider: Richelle Alexandra MD    Assessment and Plan:     Assessment:  82-year-old female with 2-week history of persistent right dorsal hand swelling.    Medical Decision Making:  Clinical evaluation demonstrates diffuse mild swelling over dorsal hand that extends over the right dorsal finger.  Minimal erythema.  White blood cell count normal.  CRP mildly elevated at 15.  Patient able to fully range wrist and fingers and minimal pain.  Low suspicion for septic arthritis at this time.  Radiographic evaluation demonstrates no acute fracture or dislocation.  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  Patient could also have a mild cellulitis or soft tissue infection.  Do not feel currently do not feel currently do not feel joint aspiration is indicated at this time.  Could consider a Keflex dose to cover for a underlying cellulitis infection. However patient left the ED prior to this recommendation.  Emergency medicine team planning to send prescription for Keflex.  Recommend elevation and compression to help with swelling. Patient should follow-up with her primary care doctor for further monitoring.      Plan:  - No plan for further intervention during this visit  - Anticoagulation/DVT prophylaxis: None from orthopedic perspective  - Antibiotics: Recommend course of PO antibiotic  - Imaging: Completed  - Activity: As tolerated  - Weight bearing: WBAT RUE  - Pain control: multimodal  - Diet: ok from orthopedic perspective  - Follow-up: Follow up with primary care doctor  - Disposition: ok to discharge from orthopedic perspective    Staff: Talat Garcia MD    Respectfully,    Wander Hayward MD  Orthopedic Surgery Resident  [phone]    Please page me directly with any  questions/concerns during regular weekday hours before 5 pm. If there is no response, if it is a weekend, or if it is during evening hours then please page the orthopedic surgery resident on call.    History of Present Illness:     Patient was seen and examined by me. History, PMH, Meds, SH, complete ROS (10 organ systems) and PE reviewed with patient and prior medical records.      Jessica Ramsey is a 82-year-old female who presents for evaluation of 2-week history of right dorsal hand swelling.  Patient reports spontaneous onset of right dorsal hand swelling that has been persistent for the past 2 weeks.  She denies using any new products or medications.  She denies any animal or insect bites.  She denies any injury or trauma to the hand.  Patient has a ring that she wears on her right finger that was cut off today in the ED.  She has been wearing this ring for several years.  With she denies history of inflammatory arthritis or osteoarthritis.  She denies fevers, chills, CP, SOB, nausea, vomiting, diarrhea.       Past Medical History:     Past Medical History:   Diagnosis Date    Arthritis     Asthma     Degenerative joint disease     Depression     Hoarseness     Hypertension     Indigestion     Nasal congestion     Night sweats     Nonrheumatic aortic valve stenosis 8/14/2019    Numbness     Pneumonia     Ringing in ears     Sleep problems     Snoring     Sore throat     Trouble swallowing     Unspecified cerebral artery occlusion with cerebral infarction     Weakness     Weight gain        Past Surgical History:     Past Surgical History:   Procedure Laterality Date    BACK SURGERY      HYSTERECTOMY      ORTHOPEDIC SURGERY      PA SPINE SURGERY PROCEDURE UNLISTED      ZZC STOMACH SURGERY PROCEDURE UNLISTED         Social History:     Social History     Socioeconomic History    Marital status:    Tobacco Use    Smoking status: Former     Packs/day: 1.00     Years: 18.00     Additional pack years: 0.00      Total pack years: 18.00     Types: Cigarettes    Smokeless tobacco: Never   Substance and Sexual Activity    Alcohol use: No    Drug use: No    Sexual activity: Never       Family History:     Family History   Problem Relation Age of Onset    Asthma Father     Cerebrovascular Disease Father     Obesity Father     Low Back Problems Other        Medications:     Current Facility-Administered Medications   Medication    lidocaine (PF) (XYLOCAINE) 1 % injection 4 mL    lidocaine (PF) (XYLOCAINE) 1 % injection 4 mL    lidocaine 1 % injection 4 mL    lidocaine 1 % injection 4 mL    triamcinolone (KENALOG-40) injection 40 mg    triamcinolone (KENALOG-40) injection 40 mg    triamcinolone (KENALOG-40) injection 40 mg    triamcinolone (KENALOG-40) injection 40 mg    triamcinolone acetonide (KENALOG-40) injection 40 mg    triamcinolone acetonide (KENALOG-40) injection 40 mg     Current Outpatient Medications   Medication Sig    cephALEXin (KEFLEX) 500 MG capsule Take 1 capsule (500 mg) by mouth 2 times daily for 5 days    acetaminophen (TYLENOL) 650 MG CR tablet Take 650 mg by mouth 4 times daily as needed.    albuterol (2.5 MG/3ML) 0.083% nebulizer solution Take 3 mLs by nebulization every 6 hours as needed for shortness of breath / dyspnea for 30 doses.    albuterol (ACCUNEB) 0.63 MG/3ML nebulizer solution Take 3 mLs by nebulization every 6 hours as needed for shortness of breath / dyspnea. Use 1 unit dose in nebulizer every 4-6 hours as needed    albuterol (PROAIR HFA/PROVENTIL HFA/VENTOLIN HFA) 108 (90 Base) MCG/ACT inhaler Inhale 2 puffs into the lungs every 6 hours as needed for shortness of breath / dyspnea or wheezing    aspirin 325 MG tablet Take 325 mg by mouth daily.    atorvastatin (LIPITOR) 40 MG tablet Take 40 mg by mouth    benzonatate (TESSALON) 100 MG capsule Take 1 capsule (100 mg) by mouth 3 times daily as needed for cough    CALCIUM & MAGNESIUM CARBONATES PO     cephALEXin (KEFLEX) 500 MG capsule Take 1  "capsule (500 mg) by mouth 2 times daily    Cholecalciferol (VITAMIN D) 2000 units tablet Take 1 tablet by mouth    ciprofloxacin (CIPRO) 500 MG tablet Take 1 tablet (500 mg) by mouth 2 times daily    COENZYME Q-10 PO     Escitalopram Oxalate (LEXAPRO PO) Take 20 mg by mouth daily    furosemide (LASIX) 20 MG tablet Take 25 mg by mouth daily    GABAPENTIN PO     losartan (COZAAR) 25 MG tablet Take 1 tablet (25 mg) by mouth daily for 30 days    metoprolol succinate ER (TOPROL-XL) 25 MG 24 hr tablet TK 1 T PO  ONCE D    Multiple Vitamins-Minerals (PRESERVISION AREDS PO) Take 1 tablet by mouth daily    omeprazole (PRILOSEC) 20 MG capsule Take 1 capsule by mouth 2 times daily.    ORDER FOR DME Equipment being ordered: Nebulizer    phenazopyridine (PYRIDIUM) 200 MG tablet TK 1 T PO TID FOR 2 DAYS    pramipexole (MIRAPEX) 0.5 MG tablet Take 2 mg by mouth At Bedtime    Sertraline HCl (ZOLOFT PO) Take 150 mg by mouth daily     simvastatin (ZOCOR) 40 MG tablet Take 1 tablet by mouth At Bedtime.    Tiotropium Bromide Monohydrate (SPIRIVA HANDIHALER IN) Once daily inhale into lungs    traZODone (DESYREL) 100 MG tablet Take 1 tablet by mouth nightly as needed for sleep. As directed    triamcinolone (KENALOG) 0.1 % external cream Apply topically 2 times daily    vitamin D (ERGOCALCIFEROL) 34563 UNIT capsule Take 1 capsule (50,000 Units) by mouth once a week       Allergies:     Allergies   Allergen Reactions    Lyrica [Pregabalin]      \"felt like I was high and wanted to sleep a lot and did not help with the pain.\"    Malic Acid Other (See Comments)     Reaction: stomach cramps    Niacin      Rapid heartbeat    Seafood     Sulfa Antibiotics      Unknown    Tramadol      Dizziness     Azithromycin Rash    Naproxen Rash     Patient reported    Sulfamethoxazole-Trimethoprim Rash       Review of Systems:     A comprehensive 10 point review of systems (constitutional, ENT, cardiac, peripheral vascular, respiratory, GI, , " musculoskeletal, skin, neurological) was performed and found to be negative except as described in this note.      Physical Exam:     Vital Signs: BP (!) 151/89   Pulse 91   Temp 98.4  F (36.9  C) (Oral)   Resp 20   SpO2 97%   General: Resting comfortably in bed, awake, alert, no apparent distress, appears stated age.    Musculoskeletal:  RUE: Mild diffuse effusion over dorsal and that extends over dorsal ring finger. No erythema or fluctuance over dorsal hand. Mild erythema at site of previous ring on ring finger with small blister to dorsal finger. Skin intact. No TTP of wrist or hand. Full active/passive ROM w/o pain of shoulder and elbow. Mild pain pain with ROM of wrist and fingers. Fires deltoid, biceps, triceps, wrist extension/flexion, , EPL, intrinsics, FPL. Able to make full fist, ok sign, thumbs up. Mild pain in dorsal hand with finger gloria cross. SILT in axillary, musculocutaneous, radial, ulnar, and median nerve distribution. Radial pulse 2+ and fingers warm and well-perfused with <2 seconds capillary refill.       Media Information          Media Information      Imaging:     XR right hand demonstrates no acute fracture or dislocation. Evidence of osteoarthrosis at right first carpometacarpal joint. Soft tissue swelling over dorsal hand.     Data:     CBC:  Lab Results   Component Value Date    WBC 7.5 12/04/2023    HGB 11.9 12/04/2023     12/04/2023     BMP:  Lab Results   Component Value Date     12/04/2023    POTASSIUM 4.2 12/04/2023    CHLORIDE 105 12/04/2023    CO2 24 12/04/2023    BUN 17.7 12/04/2023    CR 0.72 12/04/2023    ANIONGAP 10 12/04/2023    ESTEFANIA 8.9 12/04/2023     (H) 12/04/2023     Inflammatory Markers:  Lab Results   Component Value Date    WBC 7.5 12/04/2023    WBC 6.7 08/26/2023    WBC 6.9 04/05/2023    SED 33 (H) 12/04/2023    SED 18 03/28/2012    SED 19 08/29/2010    CRP - 15.90

## 2023-12-05 NOTE — ED PROVIDER NOTES
ED Provider Note  [facility]      History     Chief Complaint   Patient presents with    Joint Swelling     HPI  Jessica Ramsey is a 82 year old female with a past medical history ofHypertension, peripheral edema, COPD presenting to the emergency department due to a 2-week history of worsening right hand swelling and pain.    Patient denies any recent trauma to the area, has had some difficulty with moving her hand around, but no significant redness or warmth.  Does wear a ring on that finger and notes that she is normally able to take it off but has not been able to over the last couple of days.    Past Medical History  Past Medical History:   Diagnosis Date    Arthritis     Asthma     Degenerative joint disease     Depression     Hoarseness     Hypertension     Indigestion     Nasal congestion     Night sweats     Nonrheumatic aortic valve stenosis 8/14/2019    Numbness     Pneumonia     Ringing in ears     Sleep problems     Snoring     Sore throat     Trouble swallowing     Unspecified cerebral artery occlusion with cerebral infarction     Weakness     Weight gain      Past Surgical History:   Procedure Laterality Date    BACK SURGERY      HYSTERECTOMY      ORTHOPEDIC SURGERY      AR SPINE SURGERY PROCEDURE UNLISTED      ZZC STOMACH SURGERY PROCEDURE UNLISTED       cephALEXin (KEFLEX) 500 MG capsule  acetaminophen (TYLENOL) 650 MG CR tablet  albuterol (2.5 MG/3ML) 0.083% nebulizer solution  albuterol (ACCUNEB) 0.63 MG/3ML nebulizer solution  albuterol (PROAIR HFA/PROVENTIL HFA/VENTOLIN HFA) 108 (90 Base) MCG/ACT inhaler  aspirin 325 MG tablet  atorvastatin (LIPITOR) 40 MG tablet  benzonatate (TESSALON) 100 MG capsule  CALCIUM & MAGNESIUM CARBONATES PO  cephALEXin (KEFLEX) 500 MG capsule  Cholecalciferol (VITAMIN D) 2000 units tablet  ciprofloxacin (CIPRO) 500 MG tablet  COENZYME Q-10 PO  Escitalopram Oxalate (LEXAPRO PO)  furosemide (LASIX) 20 MG tablet  GABAPENTIN PO  losartan  "(COZAAR) 25 MG tablet  metoprolol succinate ER (TOPROL-XL) 25 MG 24 hr tablet  Multiple Vitamins-Minerals (PRESERVISION AREDS PO)  omeprazole (PRILOSEC) 20 MG capsule  ORDER FOR DME  phenazopyridine (PYRIDIUM) 200 MG tablet  pramipexole (MIRAPEX) 0.5 MG tablet  Sertraline HCl (ZOLOFT PO)  simvastatin (ZOCOR) 40 MG tablet  Tiotropium Bromide Monohydrate (SPIRIVA HANDIHALER IN)  traZODone (DESYREL) 100 MG tablet  triamcinolone (KENALOG) 0.1 % external cream  vitamin D (ERGOCALCIFEROL) 92253 UNIT capsule      Allergies   Allergen Reactions    Lyrica [Pregabalin]      \"felt like I was high and wanted to sleep a lot and did not help with the pain.\"    Malic Acid Other (See Comments)     Reaction: stomach cramps    Niacin      Rapid heartbeat    Seafood     Sulfa Antibiotics      Unknown    Tramadol      Dizziness     Azithromycin Rash    Naproxen Rash     Patient reported    Sulfamethoxazole-Trimethoprim Rash     Family History  Family History   Problem Relation Age of Onset    Asthma Father     Cerebrovascular Disease Father     Obesity Father     Low Back Problems Other      Social History   Social History     Tobacco Use    Smoking status: Former     Packs/day: 1.00     Years: 18.00     Additional pack years: 0.00     Total pack years: 18.00     Types: Cigarettes    Smokeless tobacco: Never   Substance Use Topics    Alcohol use: No    Drug use: No         A medically appropriate review of systems was performed with pertinent positives and negatives noted in the HPI, and all other systems negative.    Physical Exam   BP: 132/65  Pulse: 99  Temp: 98.4  F (36.9  C)  Resp: 18  SpO2: 98 %  Physical Exam  GEN: Well appearing, non toxic, cooperative  HEENT: normocephalic and atraumatic, PERRLA, EOMI  CV: well-perfused, normal skin color for ethnicity  PULM: breathing comfortably, in no respiratory distress  ABD: nondistended  EXT: Full range of motion.  No edema.  Right hand with dorsal hand swelling, tenderness to palpation " of the second, third and fourth metacarpal without any point tenderness, pain with flexion and extension of the hand but able to do full movement of both.  No significant range of motion limitations in the wrist itself.  NEURO: awake, conversant, grossly normal bilateral upper and lower extremity strength & ROM   SKIN: No rashes, ecchymosis, or lacerations  PSYCH: Calm and cooperative, interactive      ED Course, Procedures, & Data      Procedures        Results for orders placed or performed during the hospital encounter of 12/04/23   XR Hand Right 3 Views     Status: None    Narrative    Exam: 3 views of the right hand dated 12/4/2023.    COMPARISON: None.    CLINICAL HISTORY: Atraumatic hand swelling.    FINDINGS: AP, oblique and lateral views of the right hand were  obtained. The bones are diffusely osteopenic appearing. No fracture or  dislocation. Marked osteoarthrosis at the right first carpometacarpal  joint. Nonspecific cystic change in the triquetrum. Tiny focus of  mineralization adjacent to the base of the first metacarpal. Soft  tissue prominence over the dorsal of the hand.      Impression    IMPRESSION: Osteoarthrosis at the right first carpometacarpal joint,  otherwise no acute bone abnormality in the right hand.    MARVIN CHAKRABORTY MD         SYSTEM ID:  [id number]   Basic metabolic panel     Status: Abnormal   Result Value Ref Range    Sodium 139 135 - 145 mmol/L    Potassium 4.2 3.4 - 5.3 mmol/L    Chloride 105 98 - 107 mmol/L    Carbon Dioxide (CO2) 24 22 - 29 mmol/L    Anion Gap 10 7 - 15 mmol/L    Urea Nitrogen 17.7 8.0 - 23.0 mg/dL    Creatinine 0.72 0.51 - 0.95 mg/dL    GFR Estimate 83 >60 mL/min/1.73m2    Calcium 8.9 8.8 - 10.2 mg/dL    Glucose 148 (H) 70 - 99 mg/dL   Erythrocyte sedimentation rate auto     Status: Abnormal   Result Value Ref Range    Erythrocyte Sedimentation Rate 33 (H) 0 - 30 mm/hr   CRP inflammation     Status: Abnormal   Result Value Ref Range    CRP Inflammation 15.90 (H)  <5.00 mg/L   CBC with platelets and differential     Status: Abnormal   Result Value Ref Range    WBC Count 7.5 4.0 - 11.0 10e3/uL    RBC Count 4.39 3.80 - 5.20 10e6/uL    Hemoglobin 11.9 11.7 - 15.7 g/dL    Hematocrit 39.3 35.0 - 47.0 %    MCV 90 78 - 100 fL    MCH 27.1 26.5 - 33.0 pg    MCHC 30.3 (L) 31.5 - 36.5 g/dL    RDW 13.9 10.0 - 15.0 %    Platelet Count 280 150 - 450 10e3/uL    % Neutrophils 75 %    % Lymphocytes 15 %    % Monocytes 8 %    % Eosinophils 2 %    % Basophils 0 %    % Immature Granulocytes 0 %    NRBCs per 100 WBC 0 <1 /100    Absolute Neutrophils 5.6 1.6 - 8.3 10e3/uL    Absolute Lymphocytes 1.1 0.8 - 5.3 10e3/uL    Absolute Monocytes 0.6 0.0 - 1.3 10e3/uL    Absolute Eosinophils 0.2 0.0 - 0.7 10e3/uL    Absolute Basophils 0.0 0.0 - 0.2 10e3/uL    Absolute Immature Granulocytes 0.0 <=0.4 10e3/uL    Absolute NRBCs 0.0 10e3/uL   CBC with platelets differential     Status: Abnormal    Narrative    The following orders were created for panel order CBC with platelets differential.  Procedure                               Abnormality         Status                     ---------                               -----------         ------                     CBC with platelets and d...[[id number]]  Abnormal            Final result                 Please view results for these tests on the individual orders.     Medications - No data to display  Labs Ordered and Resulted from Time of ED Arrival to Time of ED Departure   BASIC METABOLIC PANEL - Abnormal       Result Value    Sodium 139      Potassium 4.2      Chloride 105      Carbon Dioxide (CO2) 24      Anion Gap 10      Urea Nitrogen 17.7      Creatinine 0.72      GFR Estimate 83      Calcium 8.9      Glucose 148 (*)    ERYTHROCYTE SEDIMENTATION RATE AUTO - Abnormal    Erythrocyte Sedimentation Rate 33 (*)    CRP INFLAMMATION - Abnormal    CRP Inflammation 15.90 (*)    CBC WITH PLATELETS AND DIFFERENTIAL - Abnormal    WBC Count 7.5      RBC Count 4.39       Hemoglobin 11.9      Hematocrit 39.3      MCV 90      MCH 27.1      MCHC 30.3 (*)     RDW 13.9      Platelet Count 280      % Neutrophils 75      % Lymphocytes 15      % Monocytes 8      % Eosinophils 2      % Basophils 0      % Immature Granulocytes 0      NRBCs per 100 WBC 0      Absolute Neutrophils 5.6      Absolute Lymphocytes 1.1      Absolute Monocytes 0.6      Absolute Eosinophils 0.2      Absolute Basophils 0.0      Absolute Immature Granulocytes 0.0      Absolute NRBCs 0.0       XR Hand Right 3 Views   Final Result   IMPRESSION: Osteoarthrosis at the right first carpometacarpal joint,   otherwise no acute bone abnormality in the right hand.      MARVIN CHAKRABORTY MD            SYSTEM ID:  [id number]             Critical care was not performed.     Medical Decision Making  The patient's presentation was of moderate complexity (an undiagnosed new problem with uncertain diagnosis).    The patient's evaluation involved:  review of external note(s) from 3+ sources (see separate area of note for details)  review of 3+ test result(s) ordered prior to this encounter (see separate area of note for details)  ordering and/or review of 3+ test(s) in this encounter (see separate area of note for details)  discussion of management or test interpretation with another health professional (see separate area of note for details)    The patient's management necessitated moderate risk (prescription drug management including medications given in the ED).    Assessment & Plan    82-year-old female presents emergency department due to atraumatic pain and swelling in her right hand over the last 2 weeks noted to have increasing pain, edema and tenderness over the right dorsal hand.  Has a ring on her right ring finger that is swollen, and unable to be removed easily.    Differential in her includes atraumatic swelling, arthritis, cellulitis, septic joint, retained foreign body in the finger.    Ring definitely needs to be removed  as it starting to have some macerated tissue underneath this.  Patient amenable to this and it was removed.    No fevers or chills.  She does have elevated inflammatory markers, and spoke with the orthopedic surgery resident regarding concern about possible joint infection.  Still less concerned at this point in time based on her presentation that this is cellulitis, however patient was unwilling to wait further, and left prior to any discharge paperwork or antibiotics that were prescribed.  The orthopedic surgeons did recommend that she be started on an antibiotic to cover for cellulitis, and I attempted to send this to her pharmacy with hopes that she will pick it up.    7:51 PM I called the patient at home to let her know that I sent an antibiotic for her to the [facility] pharmacy. Unable to reach her, but left a message    I have reviewed the nursing notes. I have reviewed the findings, diagnosis, plan and need for follow up with the patient.    Discharge Medication List as of 12/4/2023  5:56 PM          Final diagnoses:   Swelling of right hand       Richelle Larose MD   [facility] EMERGENCY DEPARTMENT  12/4/2023     Richelle Larose MD  12/04/23 [id number]

## 2024-04-04 ENCOUNTER — HOSPITAL ENCOUNTER (EMERGENCY)
Facility: CLINIC | Age: 84
Discharge: HOME | End: 2024-04-04
Attending: EMERGENCY MEDICINE | Admitting: EMERGENCY MEDICINE
Payer: COMMERCIAL

## 2024-04-04 ENCOUNTER — APPOINTMENT (OUTPATIENT)
Dept: CT IMAGING | Facility: CLINIC | Age: 84
End: 2024-04-04
Attending: EMERGENCY MEDICINE
Payer: COMMERCIAL

## 2024-04-04 VITALS
DIASTOLIC BLOOD PRESSURE: 120 MMHG | SYSTOLIC BLOOD PRESSURE: 155 MMHG | RESPIRATION RATE: 18 BRPM | OXYGEN SATURATION: 100 % | TEMPERATURE: 97.8 F | HEART RATE: 77 BPM

## 2024-04-04 DIAGNOSIS — N30.91 HEMORRHAGIC CYSTITIS: ICD-10-CM

## 2024-04-04 LAB
ALBUMIN SERPL BCG-MCNC: 3.6 G/DL (ref 3.5–5.2)
ALBUMIN UR-MCNC: 10 MG/DL
ALP SERPL-CCNC: 98 U/L (ref 40–150)
ALT SERPL W P-5'-P-CCNC: 7 U/L (ref 0–50)
ANION GAP SERPL CALCULATED.3IONS-SCNC: 13 MMOL/L (ref 7–15)
APPEARANCE UR: ABNORMAL
AST SERPL W P-5'-P-CCNC: 13 U/L (ref 0–45)
BASOPHILS # BLD AUTO: 0 10E3/UL (ref 0–0.2)
BASOPHILS NFR BLD AUTO: 1 %
BILIRUB SERPL-MCNC: 0.3 MG/DL
BILIRUB UR QL STRIP: NEGATIVE
BUN SERPL-MCNC: 16.8 MG/DL (ref 8–23)
CALCIUM SERPL-MCNC: 8.5 MG/DL (ref 8.8–10.2)
CHLORIDE SERPL-SCNC: 105 MMOL/L (ref 98–107)
COLOR UR AUTO: ABNORMAL
CREAT SERPL-MCNC: 0.86 MG/DL (ref 0.51–0.95)
DEPRECATED HCO3 PLAS-SCNC: 23 MMOL/L (ref 22–29)
EGFRCR SERPLBLD CKD-EPI 2021: 66 ML/MIN/1.73M2
EOSINOPHIL # BLD AUTO: 0.2 10E3/UL (ref 0–0.7)
EOSINOPHIL NFR BLD AUTO: 4 %
ERYTHROCYTE [DISTWIDTH] IN BLOOD BY AUTOMATED COUNT: 14.2 % (ref 10–15)
GLUCOSE SERPL-MCNC: 110 MG/DL (ref 70–99)
GLUCOSE UR STRIP-MCNC: NEGATIVE MG/DL
HCT VFR BLD AUTO: 36.7 % (ref 35–47)
HGB BLD-MCNC: 11.6 G/DL (ref 11.7–15.7)
HGB UR QL STRIP: ABNORMAL
HOLD SPECIMEN: NORMAL
HOLD SPECIMEN: NORMAL
IMM GRANULOCYTES # BLD: 0 10E3/UL
IMM GRANULOCYTES NFR BLD: 1 %
KETONES UR STRIP-MCNC: NEGATIVE MG/DL
LEUKOCYTE ESTERASE UR QL STRIP: ABNORMAL
LYMPHOCYTES # BLD AUTO: 1.4 10E3/UL (ref 0.8–5.3)
LYMPHOCYTES NFR BLD AUTO: 22 %
MCH RBC QN AUTO: 27.3 PG (ref 26.5–33)
MCHC RBC AUTO-ENTMCNC: 31.6 G/DL (ref 31.5–36.5)
MCV RBC AUTO: 86 FL (ref 78–100)
MONOCYTES # BLD AUTO: 0.5 10E3/UL (ref 0–1.3)
MONOCYTES NFR BLD AUTO: 8 %
MUCOUS THREADS #/AREA URNS LPF: PRESENT /LPF
NEUTROPHILS # BLD AUTO: 4.1 10E3/UL (ref 1.6–8.3)
NEUTROPHILS NFR BLD AUTO: 64 %
NITRATE UR QL: NEGATIVE
NRBC # BLD AUTO: 0 10E3/UL
NRBC BLD AUTO-RTO: 0 /100
PH UR STRIP: 6 [PH] (ref 5–7)
PLATELET # BLD AUTO: 289 10E3/UL (ref 150–450)
POTASSIUM SERPL-SCNC: 3.8 MMOL/L (ref 3.4–5.3)
PROT SERPL-MCNC: 6.3 G/DL (ref 6.4–8.3)
RBC # BLD AUTO: 4.25 10E6/UL (ref 3.8–5.2)
RBC URINE: >182 /HPF
SODIUM SERPL-SCNC: 141 MMOL/L (ref 135–145)
SP GR UR STRIP: 1.02 (ref 1–1.03)
SQUAMOUS EPITHELIAL: 1 /HPF
UROBILINOGEN UR STRIP-MCNC: NORMAL MG/DL
WBC # BLD AUTO: 6.3 10E3/UL (ref 4–11)
WBC URINE: 8 /HPF

## 2024-04-04 PROCEDURE — 87086 URINE CULTURE/COLONY COUNT: CPT | Performed by: EMERGENCY MEDICINE

## 2024-04-04 PROCEDURE — 80053 COMPREHEN METABOLIC PANEL: CPT | Performed by: EMERGENCY MEDICINE

## 2024-04-04 PROCEDURE — 81001 URINALYSIS AUTO W/SCOPE: CPT | Performed by: EMERGENCY MEDICINE

## 2024-04-04 PROCEDURE — 99284 EMERGENCY DEPT VISIT MOD MDM: CPT | Performed by: EMERGENCY MEDICINE

## 2024-04-04 PROCEDURE — 74176 CT ABD & PELVIS W/O CONTRAST: CPT

## 2024-04-04 PROCEDURE — 99284 EMERGENCY DEPT VISIT MOD MDM: CPT | Mod: 25 | Performed by: EMERGENCY MEDICINE

## 2024-04-04 PROCEDURE — 74176 CT ABD & PELVIS W/O CONTRAST: CPT | Mod: 26 | Performed by: RADIOLOGY

## 2024-04-04 PROCEDURE — 85025 COMPLETE CBC W/AUTO DIFF WBC: CPT | Performed by: EMERGENCY MEDICINE

## 2024-04-04 ASSESSMENT — ACTIVITIES OF DAILY LIVING (ADL)
ADLS_ACUITY_SCORE: 35

## 2024-04-04 ASSESSMENT — COLUMBIA-SUICIDE SEVERITY RATING SCALE - C-SSRS
2. HAVE YOU ACTUALLY HAD ANY THOUGHTS OF KILLING YOURSELF IN THE PAST MONTH?: NO
1. IN THE PAST MONTH, HAVE YOU WISHED YOU WERE DEAD OR WISHED YOU COULD GO TO SLEEP AND NOT WAKE UP?: NO
6. HAVE YOU EVER DONE ANYTHING, STARTED TO DO ANYTHING, OR PREPARED TO DO ANYTHING TO END YOUR LIFE?: NO

## 2024-04-04 NOTE — ED PROVIDER NOTES
Sign out Provider: MEJIA Obrien   Sign out Plan: 83-year-old female who presents to the emergency department with urinary frequency and dysuria.  Currently is pending laboratory evaluation and UA.  She is on Keflex that was started empirically yesterday.  Anticipate likely discharge with continuation of Keflex versus broadening coverage.    Reassessment: UA does show hematuria and signs of possible infection that is clearing in the setting of recent antibiotic initiation.  She did have a CT of the abdomen which shows no evidence of renal stone, mass or other acute findings.  She is able to empty her bladder without any symptoms of retention.  Her urine is pink-tinged, no nazia blood or clots noted.  She has remained hemodynamically stable.  Hemoglobin is at baseline at 11.6, normal platelet count, creatinine is within normal limits.  I have recommended that she continue her Keflex as she is only been taking for 1 day.  She will plan to follow-up with her primary care provider in approximately 1 week with plans to recheck UA after antibiotic treatment to determine if further workup is indicated.  She was given close return precautions if she should develop any signs of increased bleeding, retention, pain, fevers or other concerns to immediately return to the emergency department and voiced understanding.    Disposition: Discharge         Arabella Kimbrough MD  04/04/24 [id number]

## 2024-04-04 NOTE — DISCHARGE INSTRUCTIONS
Please make an appointment to follow up with Your Primary Care Provider in 5-7 days.    Continue home Keflex as prescribed.  Drink at least 8 glasses of electrolyte containing fluids a day to stay well-hydrated.    If you have worsening symptoms including increased pain, difficulty urinating, fevers, vomiting, feeling as if you are going to pass out or other concerns return to the emergency department for reevaluation.

## 2024-04-04 NOTE — ED TRIAGE NOTES
Patient arrives via EMS coming from home with CC of hematuria. Patient states she had a normal bowl movement and urinated last night and everything was normal and then woke up and found her diaper full of blood.     Patient on keflex for recent UTI     18 gauge IV established in right AC

## 2024-04-04 NOTE — ED PROVIDER NOTES
ED Provider Note  [facility]      History     Chief Complaint   Patient presents with    Hematuria     HPI  Jessica Ramsey is a 83 year old female who has a past medical history of hypertension, COPD, peripheral edema who presents to the emergency department for evaluation of hematuria.  Patient complains of urinary symptoms, dysuria over the past few days.  Patient states that she did have a telemedicine visit with primary care provider on 4/1/2024 for urinary symptoms which she describes as dysuria and irritation at the urethra.  Patient does wear a diaper due to incontinence.  Patient does report history of urinary tract infections but only gets them maybe once per year.  Patient states that she was started on Keflex and took 3 doses yesterday.  Patient reports this morning she woke up to go to the bathroom and noted blood in her diaper as well as blood in the toilet.  Patient states she did not know what or why this was happening so came in for further evaluation.  Patient denies any fever, chills, nausea, vomiting, chest pain, shortness of breath, abdominal pain, flank pain, denies any weakness or dizziness, no other complaints.    Past Medical History  Past Medical History:   Diagnosis Date    Arthritis     Asthma     Degenerative joint disease     Depression     Hoarseness     Hypertension     Indigestion     Nasal congestion     Night sweats     Nonrheumatic aortic valve stenosis 8/14/2019    Numbness     Pneumonia     Ringing in ears     Sleep problems     Snoring     Sore throat     Trouble swallowing     Unspecified cerebral artery occlusion with cerebral infarction     Weakness     Weight gain      Past Surgical History:   Procedure Laterality Date    BACK SURGERY      HYSTERECTOMY      ORTHOPEDIC SURGERY      [place] SPINE SURGERY PROCEDURE UNLISTED      ZZC STOMACH SURGERY PROCEDURE UNLISTED       acetaminophen (TYLENOL) 650 MG CR tablet  albuterol (2.5 MG/3ML) 0.083% nebulizer  "solution  albuterol (ACCUNEB) 0.63 MG/3ML nebulizer solution  albuterol (PROAIR HFA/PROVENTIL HFA/VENTOLIN HFA) 108 (90 Base) MCG/ACT inhaler  aspirin 325 MG tablet  atorvastatin (LIPITOR) 40 MG tablet  benzonatate (TESSALON) 100 MG capsule  CALCIUM & MAGNESIUM CARBONATES PO  cephALEXin (KEFLEX) 500 MG capsule  Cholecalciferol (VITAMIN D) 2000 units tablet  ciprofloxacin (CIPRO) 500 MG tablet  COENZYME Q-10 PO  Escitalopram Oxalate (LEXAPRO PO)  furosemide (LASIX) 20 MG tablet  GABAPENTIN PO  losartan (COZAAR) 25 MG tablet  metoprolol succinate ER (TOPROL-XL) 25 MG 24 hr tablet  Multiple Vitamins-Minerals (PRESERVISION AREDS PO)  omeprazole (PRILOSEC) 20 MG capsule  ORDER FOR DME  phenazopyridine (PYRIDIUM) 200 MG tablet  pramipexole (MIRAPEX) 0.5 MG tablet  Sertraline HCl (ZOLOFT PO)  simvastatin (ZOCOR) 40 MG tablet  Tiotropium Bromide Monohydrate (SPIRIVA HANDIHALER IN)  traZODone (DESYREL) 100 MG tablet  triamcinolone (KENALOG) 0.1 % external cream  vitamin D (ERGOCALCIFEROL) 68232 UNIT capsule      Allergies   Allergen Reactions    Lyrica [Pregabalin]      \"felt like I was high and wanted to sleep a lot and did not help with the pain.\"    Malic Acid Other (See Comments)     Reaction: stomach cramps    Niacin      Rapid heartbeat    Seafood     Sulfa Antibiotics      Unknown    Tramadol      Dizziness     Azithromycin Rash    Naproxen Rash     Patient reported    Sulfamethoxazole-Trimethoprim Rash     Family History  Family History   Problem Relation Age of Onset    Asthma Father     Cerebrovascular Disease Father     Obesity Father     Low Back Problems Other      Social History   Social History     Tobacco Use    Smoking status: Former     Packs/day: 1.00     Years: 18.00     Additional pack years: 0.00     Total pack years: 18.00     Types: Cigarettes    Smokeless tobacco: Never   Substance Use Topics    Alcohol use: No    Drug use: No      Past medical history, past surgical history, medications, allergies, " family history, and social history were reviewed with the patient. No additional pertinent items.      A medically appropriate review of systems was performed with pertinent positives and negatives noted in the HPI, and all other systems negative.    Physical Exam   BP: (!) 177/93  Pulse: 88  Temp: 98.1  F (36.7  C)  Resp: 18  SpO2: 98 %  Physical Exam  General: Afebrile, no acute distress   HEENT: Normocephalic, atraumatic, conjunctivae normal. MMM  Neck: non-tender, supple  Cardio: regular rate. regular rhythm   Resp: Normal work of breathing, no respiratory distress, lungs clear bilaterally, no wheezing, rhonchi, rales  Chest/Back: no visual signs of trauma, no CVA tenderness   Abdomen: soft, non distension, no tenderness, no peritoneal signs   Neuro: alert and fully oriented. CN II-XII grossly intact. Grossly normal strength and sensation in all extremities.   MSK: no deformities. Normal range of motion  Integumentary/Skin: no rash visualized, normal color  Psych: normal affect, normal behavior      ED Course, Procedures, & Data      Procedures    Results for orders placed or performed during the hospital encounter of 04/04/24   [place] Draw *Canceled*     Status: None ()    Narrative    The following orders were created for panel order [place] Draw.  Procedure                               Abnormality         Status                     ---------                               -----------         ------                       Please view results for these tests on the individual orders.   CBC with platelets and differential     Status: Abnormal   Result Value Ref Range    WBC Count 6.3 4.0 - 11.0 10e3/uL    RBC Count 4.25 3.80 - 5.20 10e6/uL    Hemoglobin 11.6 (L) 11.7 - 15.7 g/dL    Hematocrit 36.7 35.0 - 47.0 %    MCV 86 78 - 100 fL    MCH 27.3 26.5 - 33.0 pg    MCHC 31.6 31.5 - 36.5 g/dL    RDW 14.2 10.0 - 15.0 %    Platelet Count 289 150 - 450 10e3/uL    % Neutrophils 64 %    % Lymphocytes 22 %    % Monocytes  8 %    % Eosinophils 4 %    % Basophils 1 %    % Immature Granulocytes 1 %    NRBCs per 100 WBC 0 <1 /100    Absolute Neutrophils 4.1 1.6 - 8.3 10e3/uL    Absolute Lymphocytes 1.4 0.8 - 5.3 10e3/uL    Absolute Monocytes 0.5 0.0 - 1.3 10e3/uL    Absolute Eosinophils 0.2 0.0 - 0.7 10e3/uL    Absolute Basophils 0.0 0.0 - 0.2 10e3/uL    Absolute Immature Granulocytes 0.0 <=0.4 10e3/uL    Absolute NRBCs 0.0 10e3/uL   [place] Draw     Status: None (In process)    Narrative    The following orders were created for panel order [place] Draw.  Procedure                               Abnormality         Status                     ---------                               -----------         ------                     Extra Blue Top Tube[[id number]]                              In process                 Extra Red Top Tube[[id number]]                               In process                   Please view results for these tests on the individual orders.   CBC with platelets differential     Status: Abnormal    Narrative    The following orders were created for panel order CBC with platelets differential.  Procedure                               Abnormality         Status                     ---------                               -----------         ------                     CBC with platelets and d...[[id number]]  Abnormal            Final result                 Please view results for these tests on the individual orders.     Medications - No data to display  Labs Ordered and Resulted from Time of ED Arrival to Time of ED Departure   CBC WITH PLATELETS AND DIFFERENTIAL - Abnormal       Result Value    WBC Count 6.3      RBC Count 4.25      Hemoglobin 11.6 (*)     Hematocrit 36.7      MCV 86      MCH 27.3      MCHC 31.6      RDW 14.2      Platelet Count 289      % Neutrophils 64      % Lymphocytes 22      % Monocytes 8      % Eosinophils 4      % Basophils 1      % Immature Granulocytes 1      NRBCs per 100 WBC 0      Absolute  Neutrophils 4.1      Absolute Lymphocytes 1.4      Absolute Monocytes 0.5      Absolute Eosinophils 0.2      Absolute Basophils 0.0      Absolute Immature Granulocytes 0.0      Absolute NRBCs 0.0     COMPREHENSIVE METABOLIC PANEL   ROUTINE UA WITH MICROSCOPIC REFLEX TO CULTURE     No orders to display          Critical care was not performed.     Medical Decision Making  The patient's presentation was of moderate complexity (an acute illness with systemic symptoms).    The patient's evaluation involved:  review of external note(s) from 1 sources (recent IM note)  review of 2 test result(s) ordered prior to this encounter (ua and urine culture)  ordering and/or review of 3+ test(s) in this encounter (cbc, cmp, ua)  discussion of management or test interpretation with another health professional (morning provider)    The patient's management necessitated moderate risk (prescription drug management including medications given in the ED), high risk (a decision regarding hospitalization), and further care after sign-out to morning provider (see their note for further management).    Assessment & Plan    Jessica Ramsey is a 83 year old female who has a past medical history of hypertension, COPD, peripheral edema who presents to the emergency department for evaluation of hematuria.  Upon arrival patient nontoxic-appearing, afebrile, no distress.  Patient hemodynamically stable vital signs within normal limits.  Patient with dysuria for the past few days, recently started on Keflex, now with hematuria.  Denies any fevers, flank pain, abdominal pain, vomiting.    Per Chart review patient had telemedicine visit on 4/1/2024 and was started on Keflex.  Patient reports taking 3 doses so far.  Patient did not have for formal urinalysis or culture at that time.    Per chart review patient's last urine culture positive on 8/26/2023 which grew out E. Coli  Escherichia coli       DILSHAD     Ampicillin Resistant     Ampicillin/  Sulbactam Intermediate     Cefazolin Susceptible 1     Cefepime Susceptible     Cefoxitin Susceptible     Ceftazidime Susceptible     Ceftriaxone Susceptible     Ciprofloxacin Susceptible     Gentamicin Susceptible     Levofloxacin Susceptible     Nitrofurantoin Susceptible     Piperacillin/Tazobactam Susceptible     Tobramycin Susceptible     Trimethoprim/Sulfamethoxazole Susceptible                Overall patient nontoxic-appearing, I suspect patient's likely hematuria secondary to urinary tract infection given symptoms of dysuria.  Will obtain comprehensive labs, urinalysis, send urine culture, and reevaluate.    Patient signed out to morning provider Dr. Arana pending comprehensive labs, urinalysis, reevaluation, and final disposition.    I have reviewed the nursing notes. I have reviewed the findings, diagnosis, plan and need for follow up with the patient.    New Prescriptions    No medications on file       Final diagnoses:   None       Shona Obrien MD  [facility] EMERGENCY DEPARTMENT  4/4/2024     Shona Obrien MD  04/04/24 0705

## 2024-04-06 LAB — BACTERIA UR CULT: NORMAL

## 2024-04-06 NOTE — RESULT ENCOUNTER NOTE
Final urine culture report is negative.  Adult: Negative urine culture parameters per protocol: Any # urogenital jennifer, single or mixed   [facility] Emergency Dept discharge antibiotic prescribed (If applicable): Cephalexin  Treatment recommendations per [facility] ED Lab Result Urine Culture protocol: No change in plan of care.

## 2024-05-13 ENCOUNTER — HOSPITAL ENCOUNTER (EMERGENCY)
Facility: CLINIC | Age: 84
Discharge: HOME | End: 2024-05-13
Attending: EMERGENCY MEDICINE | Admitting: EMERGENCY MEDICINE
Payer: COMMERCIAL

## 2024-05-13 ENCOUNTER — APPOINTMENT (OUTPATIENT)
Dept: CT IMAGING | Facility: CLINIC | Age: 84
End: 2024-05-13
Attending: EMERGENCY MEDICINE
Payer: COMMERCIAL

## 2024-05-13 VITALS
OXYGEN SATURATION: 96 % | TEMPERATURE: 98.3 F | HEART RATE: 76 BPM | DIASTOLIC BLOOD PRESSURE: 81 MMHG | RESPIRATION RATE: 16 BRPM | SYSTOLIC BLOOD PRESSURE: 130 MMHG
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
## 2024-05-13 PROCEDURE — 72125 CT NECK SPINE W/O DYE: CPT

## 2024-05-13 PROCEDURE — 70450 CT HEAD/BRAIN W/O DYE: CPT

## 2024-05-13 PROCEDURE — 99284 EMERGENCY DEPT VISIT MOD MDM: CPT | Mod: 25 | Performed by: EMERGENCY MEDICINE

## 2024-05-13 PROCEDURE — 99284 EMERGENCY DEPT VISIT MOD MDM: CPT | Performed by: EMERGENCY MEDICINE

## 2024-05-13 PROCEDURE — 72125 CT NECK SPINE W/O DYE: CPT | Mod: 26 | Performed by: RADIOLOGY

## 2024-05-13 PROCEDURE — 70450 CT HEAD/BRAIN W/O DYE: CPT | Mod: 26 | Performed by: RADIOLOGY

## 2024-05-13 ASSESSMENT — COLUMBIA-SUICIDE SEVERITY RATING SCALE - C-SSRS
6. HAVE YOU EVER DONE ANYTHING, STARTED TO DO ANYTHING, OR PREPARED TO DO ANYTHING TO END YOUR LIFE?: NO
1. IN THE PAST MONTH, HAVE YOU WISHED YOU WERE DEAD OR WISHED YOU COULD GO TO SLEEP AND NOT WAKE UP?: NO
2. HAVE YOU ACTUALLY HAD ANY THOUGHTS OF KILLING YOURSELF IN THE PAST MONTH?: NO

## 2024-05-13 ASSESSMENT — ACTIVITIES OF DAILY LIVING (ADL)
ADLS_ACUITY_SCORE: 35

## 2024-05-13 NOTE — ED NOTES
Bed: ED10  Expected date:   Expected time:   Means of arrival:   Comments:  , 83F fall head strike altered slightly no thinners [id number]

## 2024-05-13 NOTE — ED PROVIDER NOTES
[place] EMERGENCY DEPARTMENT ([facility])    5/13/24       ED PROVIDER NOTE       History     Chief Complaint   Patient presents with    Fall     BIBA from walking dog.  Pt fell backwards [place] a woodchip pile by the street, pt reports rear head strike, denies LOC, tenderness to touch.  Pt reports right wrist pain and left leg pain.  Pt ambulatory on scene with 1 person assist using walker.  20g right ac.  Vss  bgl 95.  Pt reports feeling foggy and dizzy after falling.  Pt reports falling because walked got away from her and fell while grabbing her walker.  Pt denies taking BT     HPI  Jessica Ramsey is a 83 year old female with history of HTN, COPD, peripheral edema presenting to the ED following a fall. She states she was walking with her walker when her walker slid out in front of her. When she reached out to grab her walker she fell backwards. Patient reports pain in the back of her head, left knee, right wrist, chest, and back. She states she is not on blood thinners.    Past Medical History  Past Medical History:   Diagnosis Date    Arthritis     Asthma     Degenerative joint disease     Depression     Hoarseness     Hypertension     Indigestion     Nasal congestion     Night sweats     Nonrheumatic aortic valve stenosis 8/14/2019    Numbness     Pneumonia     Ringing in ears     Sleep problems     Snoring     Sore throat     Trouble swallowing     Unspecified cerebral artery occlusion with cerebral infarction     Weakness     Weight gain      Past Surgical History:   Procedure Laterality Date    BACK SURGERY      HYSTERECTOMY      ORTHOPEDIC SURGERY      [place] SPINE SURGERY PROCEDURE UNLISTED      ZZC STOMACH SURGERY PROCEDURE UNLISTED       acetaminophen (TYLENOL) 650 MG CR tablet  albuterol (2.5 MG/3ML) 0.083% nebulizer solution  albuterol (ACCUNEB) 0.63 MG/3ML nebulizer solution  albuterol (PROAIR HFA/PROVENTIL HFA/VENTOLIN HFA) 108 (90 Base) MCG/ACT inhaler  aspirin 325 MG tablet  atorvastatin  "(LIPITOR) 40 MG tablet  benzonatate (TESSALON) 100 MG capsule  CALCIUM & MAGNESIUM CARBONATES PO  cephALEXin (KEFLEX) 500 MG capsule  Cholecalciferol (VITAMIN D) 2000 units tablet  ciprofloxacin (CIPRO) 500 MG tablet  COENZYME Q-10 PO  Escitalopram Oxalate (LEXAPRO PO)  furosemide (LASIX) 20 MG tablet  GABAPENTIN PO  losartan (COZAAR) 25 MG tablet  metoprolol succinate ER (TOPROL-XL) 25 MG 24 hr tablet  Multiple Vitamins-Minerals (PRESERVISION AREDS PO)  omeprazole (PRILOSEC) 20 MG capsule  ORDER FOR DME  phenazopyridine (PYRIDIUM) 200 MG tablet  pramipexole (MIRAPEX) 0.5 MG tablet  Sertraline HCl (ZOLOFT PO)  simvastatin (ZOCOR) 40 MG tablet  Tiotropium Bromide Monohydrate (SPIRIVA HANDIHALER IN)  traZODone (DESYREL) 100 MG tablet  triamcinolone (KENALOG) 0.1 % external cream  vitamin D (ERGOCALCIFEROL) 29040 UNIT capsule      Allergies   Allergen Reactions    Lyrica [Pregabalin]      \"felt like I was high and wanted to sleep a lot and did not help with the pain.\"    Malic Acid Other (See Comments)     Reaction: stomach cramps    Niacin      Rapid heartbeat    Seafood     Sulfa Antibiotics      Unknown    Tramadol      Dizziness     Azithromycin Rash    Naproxen Rash     Patient reported    Sulfamethoxazole-Trimethoprim Rash     Family History  Family History   Problem Relation Age of Onset    Asthma Father     Cerebrovascular Disease Father     Obesity Father     Low Back Problems Other      Social History   Social History     Tobacco Use    Smoking status: Former     Current packs/day: 1.00     Average packs/day: 1 pack/day for 18.0 years (18.0 ttl pk-yrs)     Types: Cigarettes    Smokeless tobacco: Never   Substance Use Topics    Alcohol use: No    Drug use: No         A complete review of systems was performed with pertinent positives and negatives noted in the HPI, and all other systems negative.    Physical Exam   BP: (!) 147/71  Pulse: 74  Temp: 98.3  F (36.8  C)  Resp: 16  SpO2: 97 %  Physical " Exam    Constitutional: Pt is oriented to person, place, and time.Pt appears well-developed and well-nourished.   HENT:   Head: Normocephalic and atraumatic.   Eyes: Conjunctivae are normal. Pupils are equal, round, and reactive to light.   Neck: Normal range of motion. Neck supple.   Back: no c-spine, t-spine, or l-spine stepoff or tenderness  Cardiovascular: Normal rate, regular rhythm, normal heart sounds and intact distal pulses.    Pulmonary/Chest: Effort normal and breath sounds normal. No respiratory distress. Pt has no wheezes. Pt has no rales  Abdominal: Soft. Bowel sounds are normal. Pt exhibits no distension and no mass. No tenderness. Pt has no rebound and no guarding.   Musculoskeletal: Normal range of motion. Pt exhibits no edema.   Neurological: Pt is alert and oriented to person, place, and time. Normal reflexes. Able to ambulate with assistance  Skin: Skin is warm and dry. No rash noted.   Psychiatric: Pt has a normal mood and affect. Behavior is normal. Judgment and thought content normal.      ED Course, Procedures, & Data      Procedures              Results for orders placed or performed during the hospital encounter of 05/13/24   Head CT w/o contrast     Status: None    Narrative    EXAM: CT HEAD W/O CONTRAST  5/13/2024 7:12 PM     HISTORY: fall, head pain       COMPARISON: 4/5/2023    TECHNIQUE: Using multidetector thin collimation helical acquisition  technique, axial, coronal and sagittal CT images from the skull base  to the vertex were obtained without intravenous contrast.   (topogram) image(s) also obtained and reviewed.    FINDINGS:  No acute intracranial hemorrhage, mass effect, or midline shift. No  acute loss of gray-white matter differentiation. Ventricles are  proportionate to the cerebral sulci. Clear basal cisterns. Moderate  generalized cerebral atrophy. Hypoattenuation involving the  periventricular and deep white matter, nonspecific, likely represents  chronic small  vessel ischemic changes given patient's age.    Atraumatic calvarium. Clear paranasal sinuses, mastoid air cells.  Nonfocal orbits.       Impression    IMPRESSION:   1. No acute intracranial pathology.   2. Similar moderate generalized cerebral atrophy and leukoaraiosis.    I have personally reviewed the examination and initial interpretation  and I agree with the findings.    BELINDA PERLA MD         SYSTEM ID:  [id number]   CT Cervical Spine w/o Contrast     Status: None    Narrative    CT CERVICAL SPINE W/O CONTRAST 5/13/2024 7:12 PM    Provided History: fall, neck pain    Comparison: 3/20/2023    Technique: Using multidetector thin collimation helical acquisition  technique, axial, coronal and sagittal CT images through the cervical  spine were obtained without intravenous contrast.     Findings: C1 is normally aligned on C2. Moderate degenerative changes  of the atlantodental joint. Atlantodental interval is within normal  limits. Cystic degenerative changes of C2, most notably at the body  and base of dens. Grade 1 anterolisthesis of C3 on C4 and C4 on C5.  Straightening of the cervical lordotic curvature. No acute vertebral  body height loss. No prevertebral soft tissue thickening. Multilevel  cervical spondylosis, greatest at C5-C6, with associated severe disc  space height loss and superior endplate spurring.    The findings on a level by level basis are as follows:    C2-3:  Left greater than right facet hypertrophy with mild left neural  foraminal narrowing. The spinal canal and right neuroforamen are  patent.    C3-4:  Left greater than right facet hypertrophy and uncovertebral  arthropathy with severe left and mild right neural foraminal stenosis.  Spinal canal is patent.    C4-5:  Left greater than right facet hypertrophy causing mild left  neural foraminal narrowing. The right neural foramen and spinal canal  are patent.    C5-6:  Bilateral severe uncovertebral arthropathy causing moderate to  severe  left and moderate right neural foraminal stenosis. Mild  narrowing of the spinal canal.    C6-7:  Uncovertebral and facet arthropathy with mild bilateral neural  foraminal stenosis. Spinal canal is patent.    C7-T1:  No spinal canal or neural foraminal stenosis.     No abnormality of the paraspinous soft tissues.      Impression    Impression:   1. No acute fracture or traumatic subluxation.  2. Multilevel cervical spondylosis centered at C5-C6 with associated  severe left neural foraminal stenosis. No high-grade spinal canal  narrowing throughout the cervical spine.  3. Similar hyperdense ligamentous thickening posterior to the dens  since 2023.    I have personally reviewed the examination and initial interpretation  and I agree with the findings.    BELINDA PERLA MD         SYSTEM ID:  [id number]     Medications - No data to display  Labs Ordered and Resulted from Time of ED Arrival to Time of ED Departure - No data to display  CT Cervical Spine w/o Contrast   Final Result   Impression:    1. No acute fracture or traumatic subluxation.   2. Multilevel cervical spondylosis centered at C5-C6 with associated   severe left neural foraminal stenosis. No high-grade spinal canal   narrowing throughout the cervical spine.   3. Similar hyperdense ligamentous thickening posterior to the dens   since 2023.      I have personally reviewed the examination and initial interpretation   and I agree with the findings.      BELINDA PERLA MD            SYSTEM ID:  [id number]      Head CT w/o contrast   Final Result   IMPRESSION:    1. No acute intracranial pathology.    2. Similar moderate generalized cerebral atrophy and leukoaraiosis.      I have personally reviewed the examination and initial interpretation   and I agree with the findings.      BELINDA PERLA MD            SYSTEM ID:  [id number]      XR Wrist Right G/E 3 Views    (Results Pending)          Critical care was not performed.     Medical Decision Making  The patient's  presentation was of moderate complexity (an acute complicated injury).    The patient's evaluation involved:  review of external note(s) from 1 sources (see separate area of note for details)  review of 3+ test result(s) ordered prior to this encounter (see separate area of note for details)  ordering and/or review of 2 test(s) in this encounter (see separate area of note for details)    The patient's management necessitated only low risk treatment.    Assessment & Plan      Jessica Ramsey is a 83 year old female with history of HTN, COPD, peripheral edema presenting to the ED following a fall.  Patient is nontoxic-appearing on exam, has vital signs within normal limits with exception of a mildly elevated blood pressure at 153/90.  Patient worked up with CT head and cervical spine based on [place] CT rules with head trauma at her age.  These were negative for fracture or bleed.  There was redemonstrated left neuroforaminal stenosis at C5 and C6 which is known from prior.  Patient has no weakness or numbness on exam today that is new.  She did have some pain in her right wrist and left knee, initially was amenable to x-ray but then refused them and did not want the radiation.  We discussed outpatient follow-up and return precautions prior to discharge.    I have reviewed the nursing notes. I have reviewed the findings, diagnosis, plan and need for follow up with the patient.    New Prescriptions    No medications on file       Final diagnoses:   Fall, initial encounter     I, Carmelina Samson, am serving as a trained medical scribe to document services personally performed by Oscar Foster MD, based on the provider's statements to me.     Oscar GUILLEN MD, was physically present and have reviewed and verified the accuracy of this note documented by Carmelina Samson.   Oscar Foster MD  [facility] EMERGENCY DEPARTMENT  5/13/2024           Oscar Foster MD  05/13/24  2010

## 2024-05-13 NOTE — ED TRIAGE NOTES
BIBA from walking dog.  Pt fell backwards [place] a woodchip pile by the street, pt reports rear head strike, denies LOC, tenderness to touch.  Pt reports right wrist pain and left leg pain.  Pt ambulatory on scene with 1 person assist using walker.  20g right ac.  Vss  bgl 95.  Pt reports feeling foggy and dizzy after falling.  Pt reports falling because walked got away from her and fell while grabbing her walker.  Pt denies taking BT     Triage Assessment (Adult)       Row Name 05/13/24 1700          Triage Assessment    Airway WDL WDL        Respiratory WDL    Respiratory WDL WDL        Cardiac WDL    Cardiac WDL WDL        Cognitive/Neuro/Behavioral WDL    Cognitive/Neuro/Behavioral WDL WDL

## 2024-05-14 NOTE — DISCHARGE INSTRUCTIONS
You had a reassuring evaluation in the Emergency Department today. The scan of your head and cervical spine were negative for bleed or fracture. You may have sprained your wrist or knee in the fall and they should get better over the next week or so. If you're not getting better or things are worse then return to the ED for additional evaluation.

## 2024-05-16 ENCOUNTER — LAB REQUISITION (OUTPATIENT)
Dept: LAB | Facility: CLINIC | Age: 84
End: 2024-05-16
Payer: COMMERCIAL

## 2024-05-16 DIAGNOSIS — D48.5 NEOPLASM OF UNCERTAIN BEHAVIOR OF SKIN: ICD-10-CM

## 2024-05-16 PROCEDURE — 88305 TISSUE EXAM BY PATHOLOGIST: CPT | Mod: 26 | Performed by: DERMATOLOGY

## 2024-05-16 PROCEDURE — 88305 TISSUE EXAM BY PATHOLOGIST: CPT | Mod: TC,ORL | Performed by: STUDENT IN AN ORGANIZED HEALTH CARE EDUCATION/TRAINING PROGRAM

## 2024-05-20 LAB
PATH REPORT.COMMENTS IMP SPEC: ABNORMAL
PATH REPORT.COMMENTS IMP SPEC: YES
PATH REPORT.FINAL DX SPEC: ABNORMAL
PATH REPORT.GROSS SPEC: ABNORMAL
PATH REPORT.MICROSCOPIC SPEC OTHER STN: ABNORMAL
PATH REPORT.RELEVANT HX SPEC: ABNORMAL

## 2024-06-02 ENCOUNTER — HEALTH MAINTENANCE LETTER (OUTPATIENT)
Age: 84
End: 2024-06-02

## 2024-11-01 ENCOUNTER — HOSPITAL ENCOUNTER (EMERGENCY)
Facility: CLINIC | Age: 84
Discharge: HOME | End: 2024-11-01
Attending: EMERGENCY MEDICINE | Admitting: EMERGENCY MEDICINE
Payer: COMMERCIAL

## 2024-11-01 ENCOUNTER — APPOINTMENT (OUTPATIENT)
Dept: CT IMAGING | Facility: CLINIC | Age: 84
End: 2024-11-01
Attending: EMERGENCY MEDICINE
Payer: COMMERCIAL

## 2024-11-01 ENCOUNTER — APPOINTMENT (OUTPATIENT)
Dept: MRI IMAGING | Facility: CLINIC | Age: 84
End: 2024-11-01
Payer: COMMERCIAL

## 2024-11-01 VITALS
SYSTOLIC BLOOD PRESSURE: 161 MMHG | DIASTOLIC BLOOD PRESSURE: 75 MMHG | RESPIRATION RATE: 16 BRPM | BODY MASS INDEX: 39.14 KG/M2 | OXYGEN SATURATION: 100 % | WEIGHT: 229.28 LBS | HEIGHT: 64 IN | HEART RATE: 65 BPM | TEMPERATURE: 97.8 F

## 2024-11-01 DIAGNOSIS — R42 VERTIGO: ICD-10-CM

## 2024-11-01 LAB
ANION GAP SERPL CALCULATED.3IONS-SCNC: 8 MMOL/L (ref 7–15)
APTT PPP: 26 SECONDS (ref 22–38)
BASOPHILS # BLD AUTO: 0 10E3/UL (ref 0–0.2)
BASOPHILS NFR BLD AUTO: 1 %
BUN SERPL-MCNC: 17.3 MG/DL (ref 8–23)
CALCIUM SERPL-MCNC: 9.2 MG/DL (ref 8.8–10.4)
CHLORIDE SERPL-SCNC: 102 MMOL/L (ref 98–107)
CHOLEST SERPL-MCNC: 201 MG/DL
CREAT BLD-MCNC: 0.9 MG/DL (ref 0.5–1)
CREAT SERPL-MCNC: 0.79 MG/DL (ref 0.51–0.95)
EGFRCR SERPLBLD CKD-EPI 2021: 73 ML/MIN/1.73M2
EGFRCR SERPLBLD CKD-EPI 2021: >60 ML/MIN/1.73M2
EOSINOPHIL # BLD AUTO: 0.1 10E3/UL (ref 0–0.7)
EOSINOPHIL NFR BLD AUTO: 2 %
ERYTHROCYTE [DISTWIDTH] IN BLOOD BY AUTOMATED COUNT: 13.2 % (ref 10–15)
EST. AVERAGE GLUCOSE BLD GHB EST-MCNC: 126 MG/DL
GLUCOSE BLDC GLUCOMTR-MCNC: 102 MG/DL (ref 70–99)
GLUCOSE SERPL-MCNC: 100 MG/DL (ref 70–99)
HBA1C MFR BLD: 6 %
HCO3 SERPL-SCNC: 29 MMOL/L (ref 22–29)
HCT VFR BLD AUTO: 42.3 % (ref 35–47)
HDLC SERPL-MCNC: 74 MG/DL
HGB BLD-MCNC: 13 G/DL (ref 11.7–15.7)
HOLD SPECIMEN: NORMAL
IMM GRANULOCYTES # BLD: 0 10E3/UL
IMM GRANULOCYTES NFR BLD: 0 %
INR PPP: 1 (ref 0.85–1.15)
LDLC SERPL CALC-MCNC: 110 MG/DL
LYMPHOCYTES # BLD AUTO: 1.7 10E3/UL (ref 0.8–5.3)
LYMPHOCYTES NFR BLD AUTO: 21 %
MCH RBC QN AUTO: 28 PG (ref 26.5–33)
MCHC RBC AUTO-ENTMCNC: 30.7 G/DL (ref 31.5–36.5)
MCV RBC AUTO: 91 FL (ref 78–100)
MONOCYTES # BLD AUTO: 0.6 10E3/UL (ref 0–1.3)
MONOCYTES NFR BLD AUTO: 7 %
NEUTROPHILS # BLD AUTO: 5.5 10E3/UL (ref 1.6–8.3)
NEUTROPHILS NFR BLD AUTO: 70 %
NONHDLC SERPL-MCNC: 127 MG/DL
NRBC # BLD AUTO: 0 10E3/UL
NRBC BLD AUTO-RTO: 0 /100
PLATELET # BLD AUTO: 269 10E3/UL (ref 150–450)
POTASSIUM SERPL-SCNC: 4.6 MMOL/L (ref 3.4–5.3)
RBC # BLD AUTO: 4.64 10E6/UL (ref 3.8–5.2)
SODIUM SERPL-SCNC: 139 MMOL/L (ref 135–145)
TRIGL SERPL-MCNC: 84 MG/DL
TROPONIN T SERPL HS-MCNC: 13 NG/L
WBC # BLD AUTO: 7.8 10E3/UL (ref 4–11)

## 2024-11-01 PROCEDURE — 82565 ASSAY OF CREATININE: CPT

## 2024-11-01 PROCEDURE — 85730 THROMBOPLASTIN TIME PARTIAL: CPT | Performed by: EMERGENCY MEDICINE

## 2024-11-01 PROCEDURE — 82962 GLUCOSE BLOOD TEST: CPT

## 2024-11-01 PROCEDURE — 85004 AUTOMATED DIFF WBC COUNT: CPT | Performed by: EMERGENCY MEDICINE

## 2024-11-01 PROCEDURE — 70496 CT ANGIOGRAPHY HEAD: CPT

## 2024-11-01 PROCEDURE — 99285 EMERGENCY DEPT VISIT HI MDM: CPT | Mod: 25 | Performed by: EMERGENCY MEDICINE

## 2024-11-01 PROCEDURE — 70450 CT HEAD/BRAIN W/O DYE: CPT

## 2024-11-01 PROCEDURE — 70551 MRI BRAIN STEM W/O DYE: CPT | Mod: 26 | Performed by: RADIOLOGY

## 2024-11-01 PROCEDURE — 93005 ELECTROCARDIOGRAM TRACING: CPT | Performed by: EMERGENCY MEDICINE

## 2024-11-01 PROCEDURE — 82465 ASSAY BLD/SERUM CHOLESTEROL: CPT

## 2024-11-01 PROCEDURE — 80048 BASIC METABOLIC PNL TOTAL CA: CPT | Performed by: EMERGENCY MEDICINE

## 2024-11-01 PROCEDURE — 85610 PROTHROMBIN TIME: CPT | Performed by: EMERGENCY MEDICINE

## 2024-11-01 PROCEDURE — 36415 COLL VENOUS BLD VENIPUNCTURE: CPT | Performed by: EMERGENCY MEDICINE

## 2024-11-01 PROCEDURE — 70496 CT ANGIOGRAPHY HEAD: CPT | Mod: 26 | Performed by: RADIOLOGY

## 2024-11-01 PROCEDURE — 99284 EMERGENCY DEPT VISIT MOD MDM: CPT | Performed by: EMERGENCY MEDICINE

## 2024-11-01 PROCEDURE — 70551 MRI BRAIN STEM W/O DYE: CPT

## 2024-11-01 PROCEDURE — 250N000011 HC RX IP 250 OP 636: Performed by: EMERGENCY MEDICINE

## 2024-11-01 PROCEDURE — 70498 CT ANGIOGRAPHY NECK: CPT

## 2024-11-01 PROCEDURE — 83036 HEMOGLOBIN GLYCOSYLATED A1C: CPT

## 2024-11-01 PROCEDURE — 70498 CT ANGIOGRAPHY NECK: CPT | Mod: 26 | Performed by: RADIOLOGY

## 2024-11-01 PROCEDURE — 93010 ELECTROCARDIOGRAM REPORT: CPT | Performed by: EMERGENCY MEDICINE

## 2024-11-01 PROCEDURE — 84484 ASSAY OF TROPONIN QUANT: CPT | Performed by: EMERGENCY MEDICINE

## 2024-11-01 PROCEDURE — 250N000013 HC RX MED GY IP 250 OP 250 PS 637: Performed by: EMERGENCY MEDICINE

## 2024-11-01 PROCEDURE — 250N000009 HC RX 250: Performed by: EMERGENCY MEDICINE

## 2024-11-01 RX ORDER — IOPAMIDOL 755 MG/ML
67 INJECTION, SOLUTION INTRAVASCULAR ONCE
Status: COMPLETED | OUTPATIENT
Start: 2024-11-01 | End: 2024-11-01

## 2024-11-01 RX ORDER — MECLIZINE HYDROCHLORIDE 25 MG/1
25 TABLET ORAL ONCE
Status: COMPLETED | OUTPATIENT
Start: 2024-11-01 | End: 2024-11-01

## 2024-11-01 RX ORDER — MECLIZINE HYDROCHLORIDE 25 MG/1
25-50 TABLET ORAL 3 TIMES DAILY PRN
Qty: 30 TABLET | Refills: 0 | Status: SHIPPED | OUTPATIENT
Start: 2024-11-01

## 2024-11-01 RX ADMIN — SODIUM CHLORIDE, PRESERVATIVE FREE 90 ML: 5 INJECTION INTRAVENOUS at 17:15

## 2024-11-01 RX ADMIN — IOPAMIDOL 67 ML: 755 INJECTION, SOLUTION INTRAVENOUS at 17:15

## 2024-11-01 RX ADMIN — MECLIZINE HYDROCHLORIDE 25 MG: 25 TABLET ORAL at 18:48

## 2024-11-01 ASSESSMENT — COLUMBIA-SUICIDE SEVERITY RATING SCALE - C-SSRS
1. IN THE PAST MONTH, HAVE YOU WISHED YOU WERE DEAD OR WISHED YOU COULD GO TO SLEEP AND NOT WAKE UP?: NO
2. HAVE YOU ACTUALLY HAD ANY THOUGHTS OF KILLING YOURSELF IN THE PAST MONTH?: NO
6. HAVE YOU EVER DONE ANYTHING, STARTED TO DO ANYTHING, OR PREPARED TO DO ANYTHING TO END YOUR LIFE?: NO

## 2024-11-01 ASSESSMENT — ACTIVITIES OF DAILY LIVING (ADL)
ADLS_ACUITY_SCORE: 0

## 2024-11-01 NOTE — CONSULTS
[facility]     Stroke Code Note          History of Present Illness     Chief Complaint: Dizziness      Jessica Ramsey is a 83 year old female with PMH of HTN, HLD, elevated A1c, nonrheumatic aortic valve stenosis, history of dizziness, asthma, COPD, morbid obesity, and MDD presents to the ED and stroke called for acute onset dizziness with gait imbalance. LKW at 4 PM. BP of 161/75 and glucose of 102. Not on anticoagulation but on  mg daily.     Patient reports upon sitting patient developed acute onset dizziness that persisted with standing with gait imbalance with fall. Denies LOC or head trauma. Patient reports improvement of dizziness since onset that is improved with closure of eyes. Denies focal numbness, weakness, vision loss, dysphagia, vertigo, or lightheadedness.          Past Medical History     Stroke risk factors: HTN, HLD, elevated A1c, nonrheumatic aortic valve stenosis    Preadmission antithrombotic regimen:  mg daily    Modified Miguel Angel Score (Pre-morbid)  0-No deficits                   Assessment and Plan       Jessica Ramsey is a 83 year old female with PMH of HTN, HLD, elevated A1c, nonrheumatic aortic valve stenosis, history of dizziness, asthma, COPD, morbid obesity, and MDD presents to the ED and stroke called for acute onset dizziness with gait imbalance. LKW at 4 PM. BP of 161/75 and glucose of 102. Not on anticoagulation but on  mg daily.     NIH of 1 for RUE dymetria otherwise unremarkable. CT head no hemorrhage. CTA head and neck no LVO or severe stenosis but notable atherosclerosis plaque left greater than right ICA. Given low NIH and improving symptoms would not recommend TNK. No mechanical thrombectomy given no LVO. Will pursue MRI josefina without contrast to further evaluate for stroke but given exam concern for peripheral vertigo vs central etiology.      Intravenous Thrombolysis  Not given due to:   -  minor/isolated/quickly resolving symptoms     Endovascular Treatment  Not initiated due to absence of proximal vessel occlusion     Plan:  - Neurochecks and Vital Signs every 2 hour  - Permissive HTN; goal SBP < 220/120 mmHg pending MRI brain if negative for then goal normotension   - MRI Brain without contrast  - EKG  - Orthostatic vitals when able with heart rate and BP after laying flat for 5 minutes, standing 1 minute, and standing 3 minutes   - Bedside Glucose Monitoring  - A1c, Lipid Panel  - PT/OT  - Euthermia, Euglycemia     ___________________________________________________________________    The patient was discussed with Stroke Staff is Dr. Van.    Giuliana Green MD  Neurology Resident    ___________________________________________________________________        Imaging/Labs   (personally reviewed )    CT head: no hemorrhage, small vessel ischemic disease seen    CTA head/neck: no LVO or severe stenosis, atherosclerosis plaque left greater than right ICA           Physical Examination     BP: (!) 161/75   Pulse: 65   Resp: 16   Temp: 97.8  F (36.6  C)   Temp src: Oral   SpO2: 100 %   O2 Device: None (Room air)   Weight: 104 kg (229 lb 4.5 oz)    Wt Readings from Last 2 Encounters:   11/01/24 104 kg (229 lb 4.5 oz)   08/26/23 98.9 kg (218 lb)       General Exam  General:  patient lying in bed without any acute distress    HEENT:  normocephalic/atraumatic  Cardio:  RRR  Pulmonary:  no respiratory distress  Abdomen:  soft  Extremities:  no edema  Skin:  intact, warm/dry     Neuro Exam  Mental Status:  alert, oriented x 3, follows commands, speech clear and fluent, naming and repetition normal  Cranial Nerves:  visual fields intact (tested by nurse), EOMI with normal smooth pursuit, facial sensation intact and symmetric (tested by nurse), facial movements symmetric, hearing not formally tested but intact to conversation, no dysarthria, shoulder shrug equal bilaterally, tongue protrusion midline  Motor:   no abnormal movements, able to move all limbs antigravity spontaneously with no signs of hemiparesis observed, no pronator drift   Reflexes:   no clonus, toes down going   Sensory:  light touch sensation intact and symmetric throughout upper and lower extremities, no extinction on double simultaneous stimulation   Coordination:  dysmetria of the RUE with finger-to-nose  but LUE without dysmetria and heel-to-shin bilaterally without dysmetria  Station/Gait:   deferred         Stroke Scales       NIHSS  1a. Level of Consciousness 0-->Alert, keenly responsive   1b. LOC Questions 0-->Answers both questions correctly   1c. LOC Commands 0-->Performs both tasks correctly   2.   Best Gaze 0-->Normal   3.   Visual 0-->No visual loss   4.   Facial Palsy 0-->Normal symmetrical movements   5a. Motor Arm, Left 0-->No drift, limb holds 90 (or 45) degrees for full 10 secs   5b. Motor Arm, Right 0-->No drift, limb holds 90 (or 45) degrees for full 10 secs   6a. Motor Leg, Left 0-->No drift, leg holds 30 degree position for full 5 secs   6b. Motor Leg, right 0-->No drift, leg holds 30 degree position for full 5 secs   7.   Limb Ataxia 1-->Present in one limb (RUE dysmetria)   8.   Sensory 0-->Normal, no sensory loss   9.   Best Language 0-->No aphasia, normal   10. Dysarthria 0-->Normal   11. Extinction and Inattention  0-->No abnormality   Total 1 (11/01/24 1713)            Labs     CBC  Lab Results   Component Value Date    HGB 13.0 11/01/2024    HCT 42.3 11/01/2024    WBC 7.8 11/01/2024     11/01/2024       BMP  Lab Results   Component Value Date     04/04/2024    POTASSIUM 3.8 04/04/2024    CHLORIDE 105 04/04/2024    CO2 23 04/04/2024    BUN 16.8 04/04/2024    CR 0.9 11/01/2024     (H) 11/01/2024    ESTEFANIA 8.5 (L) 04/04/2024       INR  INR   Date Value Ref Range Status   12/27/2010 0.95 0.86 - 1.14 Final   08/29/2010 0.98 0.86 - 1.14 Final   09/03/2009 1.01 0.86 - 1.14 Final       Data   Stroke Code Data  (for  "stroke code without tele)  Stroke code activated 11/01/24  1703   First stroke provider response 11/01/24  1707   Last known normal 11/01/24  1600   Time of discovery (or onset of symptoms)        Head CT read by Stroke Neuro Provider 11/01/24  1718   Was stroke code de-escalated? Yes  11/01/24  1723        Clinically Significant Risk Factors Present on Admission                 # Drug Induced Platelet Defect: home medication list includes an antiplatelet medication   # Hypertension: Noted on problem list         # Obesity: Estimated body mass index is 39.36 kg/m  as calculated from the following:    Height as of this encounter: 1.626 m (5' 4\").    Weight as of this encounter: 104 kg (229 lb 4.5 oz).       # COPD: noted on problem list        "

## 2024-11-01 NOTE — CARE PLAN
Stroke Code Nurse-Responder Note    Arrival Time to Stroke Code: 1705    Stroke Code Team interventions:   - De-escalated at 1723 by Dr. Green (Stroke Provider)    ED/Bedside Nurse providing handoff: Aura ALONZO RN    Time left for CT: 1710    Time arrived to next location (ED/Unit/IR): 1718    ED/Bedside Nurse given handoff (name/time): Aura Mccurdy, RN

## 2024-11-01 NOTE — ED TRIAGE NOTES
Patient presents with sudden onset of dizziness at 1600 today. She reports that she was sitting on her couch when she got very dizzy. Since then she has been unable to ambulate du to the dizziness. She reports that she had to lean against the walls to get around. She had felt like she was going to pass out.     A&Ox3, BG in triage 102.        Triage Assessment (Adult)       Row Name 11/01/24 6419          Triage Assessment    Airway WDL WDL        Respiratory WDL    Respiratory WDL WDL        Skin Circulation/Temperature WDL    Skin Circulation/Temperature WDL WDL        Cardiac WDL    Cardiac WDL WDL        Peripheral/Neurovascular WDL    Peripheral Neurovascular WDL WDL        Cognitive/Neuro/Behavioral WDL    Cognitive/Neuro/Behavioral WDL all     Orientation oriented x 4        Pupils (CN II)    Pupil PERRLA yes     Pupil Size Left 3 mm     Pupil Size Right 3 mm        [place] Coma Scale    Best Eye Response 4-->(E4) spontaneous     Best Motor Response 6-->(M6) obeys commands     Best Verbal Response 5-->(V5) oriented     [place] Coma Scale Score 15

## 2024-11-01 NOTE — PHARMACY
Pharmacy Stroke Code Response    Pharmacist responded as part of the Stroke Code Team activation to patient care area ED01. The Stroke Team determined that the patient was not a candidate for IV thrombolytic therapy and the pharmacy team was dismissed at 1722.    Dimitri Ferrari RPH

## 2024-11-01 NOTE — ED PROVIDER NOTES
[facility] ([facility])    11/01/24       ED PROVIDER NOTE      History     Chief Complaint   Patient presents with    Dizziness     HPI  Jessica Ramsey is a 83 year old female  with a history of COPD and cerebral infarction who presents to the emergency department for dizziness. The patient states that she feel like her head and brain are not connecting with the rest of her body. She reports that it does not feel like the room is spinning. She adds that this started at 4pm today and it has been consistent since then but it was worse for about 5 minuets. She first felt this sensation when she was sitting down but when she got up it got worse. She did have to catch herself on the wall. She was unable to walk well because her balance was off and did fall. She has not had this before. She denies any recent illnesses, weakness on one side, ear ringing, chest pain, chest pressure or any other medical problems. She does have a primary.     Past Medical History  Past Medical History:   Diagnosis Date    Arthritis     Asthma     Degenerative joint disease     Depression     Hoarseness     Hypertension     Indigestion     Nasal congestion     Night sweats     Nonrheumatic aortic valve stenosis 8/14/2019    Numbness     Pneumonia     Ringing in ears     Sleep problems     Snoring     Sore throat     Trouble swallowing     Unspecified cerebral artery occlusion with cerebral infarction     Weakness     Weight gain      Past Surgical History:   Procedure Laterality Date    BACK SURGERY      HYSTERECTOMY      ORTHOPEDIC SURGERY      [place] SPINE SURGERY PROCEDURE UNLISTED      ZZC STOMACH SURGERY PROCEDURE UNLISTED       acetaminophen (TYLENOL) 650 MG CR tablet  albuterol (2.5 MG/3ML) 0.083% nebulizer solution  albuterol (ACCUNEB) 0.63 MG/3ML nebulizer solution  albuterol (PROAIR HFA/PROVENTIL HFA/VENTOLIN HFA) 108 (90 Base) MCG/ACT inhaler  aspirin 325 MG tablet  atorvastatin (LIPITOR) 40 MG  "tablet  benzonatate (TESSALON) 100 MG capsule  CALCIUM & MAGNESIUM CARBONATES PO  cephALEXin (KEFLEX) 500 MG capsule  Cholecalciferol (VITAMIN D) 2000 units tablet  ciprofloxacin (CIPRO) 500 MG tablet  COENZYME Q-10 PO  Escitalopram Oxalate (LEXAPRO PO)  furosemide (LASIX) 20 MG tablet  GABAPENTIN PO  losartan (COZAAR) 25 MG tablet  metoprolol succinate ER (TOPROL-XL) 25 MG 24 hr tablet  Multiple Vitamins-Minerals (PRESERVISION AREDS PO)  omeprazole (PRILOSEC) 20 MG capsule  ORDER FOR DME  phenazopyridine (PYRIDIUM) 200 MG tablet  pramipexole (MIRAPEX) 0.5 MG tablet  Sertraline HCl (ZOLOFT PO)  simvastatin (ZOCOR) 40 MG tablet  Tiotropium Bromide Monohydrate (SPIRIVA HANDIHALER IN)  traZODone (DESYREL) 100 MG tablet  triamcinolone (KENALOG) 0.1 % external cream  vitamin D (ERGOCALCIFEROL) 46171 UNIT capsule      Allergies   Allergen Reactions    Lyrica [Pregabalin]      \"felt like I was high and wanted to sleep a lot and did not help with the pain.\"    Malic Acid Other (See Comments)     Reaction: stomach cramps    Niacin      Rapid heartbeat    Seafood     Sulfa Antibiotics      Unknown    Tramadol      Dizziness     Azithromycin Rash    Naproxen Rash     Patient reported    Sulfamethoxazole-Trimethoprim Rash     Family History  Family History   Problem Relation Age of Onset    Asthma Father     Cerebrovascular Disease Father     Obesity Father     Low Back Problems Other      Social History   Social History     Tobacco Use    Smoking status: Former     Current packs/day: 1.00     Average packs/day: 1 pack/day for 18.0 years (18.0 ttl pk-yrs)     Types: Cigarettes    Smokeless tobacco: Never   Substance Use Topics    Alcohol use: No    Drug use: No      A complete review of systems was performed with pertinent positives and negatives noted in the HPI, and all other systems negative.    Physical Exam   BP: (!) 171/81  Pulse: 70  Temp: 97.8  F (36.6  C)  Resp: 16  Height: 162.6 cm (5' 4\")  Weight: 97.5 kg (215 " lb)  SpO2: 97 %  Physical Exam  Vitals and nursing note reviewed.   Constitutional:       General: She is not in acute distress.     Appearance: Normal appearance. She is well-developed.   HENT:      Head: Normocephalic and atraumatic.   Eyes:      General: No scleral icterus.     Conjunctiva/sclera: Conjunctivae normal.   Cardiovascular:      Rate and Rhythm: Normal rate.   Pulmonary:      Effort: Pulmonary effort is normal. No respiratory distress.   Abdominal:      General: Abdomen is flat.   Musculoskeletal:      Cervical back: Normal range of motion and neck supple.   Skin:     General: Skin is warm and dry.      Findings: No rash.   Neurological:      Mental Status: She is alert and oriented to person, place, and time.             ED Course, Procedures, & Data      Procedures            EKG Interpretation:      Interpreted by Rosa Floyd                        Scheduling Type..: Person, MD  Time reviewed: per Epic  Symptoms at time of EKG: dizzy   Rhythm: normal sinus   Rate: normal  Axis: normal  Ectopy: none  Conduction: normal  ST Segments/ T Waves: No ST-T wave changes  Q Waves: none  Comparison to prior: Unchanged    Clinical Impression: normal EKG       No results found for any visits on 11/01/24.  Medications - No data to display  Labs Ordered and Resulted from Time of ED Arrival to Time of ED Departure - No data to display  No orders to display          Critical care was not performed.     Medical Decision Making  The patient's presentation was of high complexity (an acute health issue posing potential threat to life or bodily function).    The patient's evaluation involved:  ordering and/or review of 3+ test(s) in this encounter (see separate area of note for details)    The patient's management necessitated further care after sign-out to overnight ED staff (see their note for further management).    Assessment & Plan       83 year old female  with a history of COPD and cerebral infarction who  presents to the emergency department for dizziness. The patient states that she feel like her head and brain are not connecting with the rest of her body.  Vital signs notable for blood pressure ovation to 131/81 but otherwise afebrile stable including normal pulse ox at 98% on room air.  EKG obtained which revealed sinus rhythm without acute ischemic changes. IV established, labs concent reviewed covid epic essentially normal CBC electrolytes.  Stroke activation from triage, patient sent to CT for imaging of the head and CTA head and neck no evidence of CVA.  Neurostroke consult following, please refer to the note for further details.  Recommendation for MRI which is pending at time of this dictation.    Patient was administered meclizine after CT imaging results and upon repeat assessment her symptoms had nearly normalized.  Patient be signed out to overnight staff pending MRI and repeat assessment and likely discharge from there.    I have reviewed the nursing notes. I have reviewed the findings, diagnosis, plan and need for follow up with the patient.    New Prescriptions    No medications on file       Final diagnoses:   Vertigo   I, Shilpa Valenzuela, am serving as a trained medical scribe to document services personally performed by Rosa Floyd MD, based on the provider's statements to me.     IRosa MD, was physically present and have reviewed and verified the accuracy of this note documented by Shilpa Valenzuela.     Rosa Floyd MD  [facility] EMERGENCY DEPARTMENT  11/1/2024     Rosa Floyd MD  11/03/24 [id number]

## 2024-11-02 LAB
ATRIAL RATE - MUSE: 64 BPM
DIASTOLIC BLOOD PRESSURE - MUSE: NORMAL MMHG
INTERPRETATION ECG - MUSE: NORMAL
P AXIS - MUSE: 46 DEGREES
PR INTERVAL - MUSE: 188 MS
QRS DURATION - MUSE: 86 MS
QT - MUSE: 404 MS
QTC - MUSE: 416 MS
R AXIS - MUSE: -18 DEGREES
SYSTOLIC BLOOD PRESSURE - MUSE: NORMAL MMHG
T AXIS - MUSE: -2 DEGREES
VENTRICULAR RATE- MUSE: 64 BPM

## 2025-01-27 ENCOUNTER — HOSPITAL ENCOUNTER (INPATIENT)
Facility: CLINIC | Age: 85
LOS: 1 days | Discharge: HOME | DRG: 193 | End: 2025-01-29
Attending: EMERGENCY MEDICINE | Admitting: HOSPITALIST
Payer: COMMERCIAL

## 2025-01-27 ENCOUNTER — APPOINTMENT (OUTPATIENT)
Dept: GENERAL RADIOLOGY | Facility: CLINIC | Age: 85
DRG: 193 | End: 2025-01-27
Attending: EMERGENCY MEDICINE
Payer: COMMERCIAL

## 2025-01-27 DIAGNOSIS — K59.00 CONSTIPATION, UNSPECIFIED CONSTIPATION TYPE: ICD-10-CM

## 2025-01-27 DIAGNOSIS — R09.02 HYPOXIA: ICD-10-CM

## 2025-01-27 DIAGNOSIS — J44.1 COPD EXACERBATION (H): ICD-10-CM

## 2025-01-27 DIAGNOSIS — R50.9 FEVER IN ADULT: ICD-10-CM

## 2025-01-27 DIAGNOSIS — R05.1 ACUTE COUGH: ICD-10-CM

## 2025-01-27 DIAGNOSIS — J10.1 INFLUENZA A: ICD-10-CM

## 2025-01-27 DIAGNOSIS — J45.40 MODERATE PERSISTENT ASTHMA, UNSPECIFIED WHETHER COMPLICATED: Primary | ICD-10-CM

## 2025-01-27 DIAGNOSIS — J44.9 COPD (CHRONIC OBSTRUCTIVE PULMONARY DISEASE) (H): ICD-10-CM

## 2025-01-27 LAB
ALBUMIN SERPL BCG-MCNC: 4 G/DL (ref 3.5–5.2)
ALP SERPL-CCNC: 96 U/L (ref 40–150)
ALT SERPL W P-5'-P-CCNC: 9 U/L (ref 0–50)
ANION GAP SERPL CALCULATED.3IONS-SCNC: 11 MMOL/L (ref 7–15)
AST SERPL W P-5'-P-CCNC: 17 U/L (ref 0–45)
BASOPHILS # BLD AUTO: 0 10E3/UL (ref 0–0.2)
BASOPHILS NFR BLD AUTO: 1 %
BILIRUB SERPL-MCNC: 0.2 MG/DL
BUN SERPL-MCNC: 11.2 MG/DL (ref 8–23)
CALCIUM SERPL-MCNC: 8.5 MG/DL (ref 8.8–10.4)
CHLORIDE SERPL-SCNC: 100 MMOL/L (ref 98–107)
CREAT SERPL-MCNC: 0.96 MG/DL (ref 0.51–0.95)
EGFRCR SERPLBLD CKD-EPI 2021: 58 ML/MIN/1.73M2
EOSINOPHIL # BLD AUTO: 0 10E3/UL (ref 0–0.7)
EOSINOPHIL NFR BLD AUTO: 0 %
ERYTHROCYTE [DISTWIDTH] IN BLOOD BY AUTOMATED COUNT: 13.2 % (ref 10–15)
GLUCOSE SERPL-MCNC: 125 MG/DL (ref 70–99)
HCO3 SERPL-SCNC: 26 MMOL/L (ref 22–29)
HCT VFR BLD AUTO: 39.5 % (ref 35–47)
HGB BLD-MCNC: 12.7 G/DL (ref 11.7–15.7)
HOLD SPECIMEN: NORMAL
IMM GRANULOCYTES # BLD: 0 10E3/UL
IMM GRANULOCYTES NFR BLD: 1 %
LACTATE SERPL-SCNC: 0.8 MMOL/L (ref 0.7–2)
LYMPHOCYTES # BLD AUTO: 0.5 10E3/UL (ref 0.8–5.3)
LYMPHOCYTES NFR BLD AUTO: 9 %
MCH RBC QN AUTO: 28.5 PG (ref 26.5–33)
MCHC RBC AUTO-ENTMCNC: 32.2 G/DL (ref 31.5–36.5)
MCV RBC AUTO: 89 FL (ref 78–100)
MONOCYTES # BLD AUTO: 0.7 10E3/UL (ref 0–1.3)
MONOCYTES NFR BLD AUTO: 13 %
NEUTROPHILS # BLD AUTO: 4.1 10E3/UL (ref 1.6–8.3)
NEUTROPHILS NFR BLD AUTO: 77 %
NRBC # BLD AUTO: 0 10E3/UL
NRBC BLD AUTO-RTO: 0 /100
PLATELET # BLD AUTO: 199 10E3/UL (ref 150–450)
POTASSIUM SERPL-SCNC: 3.9 MMOL/L (ref 3.4–5.3)
PROT SERPL-MCNC: 7 G/DL (ref 6.4–8.3)
RBC # BLD AUTO: 4.45 10E6/UL (ref 3.8–5.2)
SODIUM SERPL-SCNC: 137 MMOL/L (ref 135–145)
WBC # BLD AUTO: 5.3 10E3/UL (ref 4–11)

## 2025-01-27 PROCEDURE — 82040 ASSAY OF SERUM ALBUMIN: CPT | Performed by: EMERGENCY MEDICINE

## 2025-01-27 PROCEDURE — 87637 SARSCOV2&INF A&B&RSV AMP PRB: CPT | Performed by: EMERGENCY MEDICINE

## 2025-01-27 PROCEDURE — 87040 BLOOD CULTURE FOR BACTERIA: CPT | Performed by: EMERGENCY MEDICINE

## 2025-01-27 PROCEDURE — 83605 ASSAY OF LACTIC ACID: CPT | Performed by: EMERGENCY MEDICINE

## 2025-01-27 PROCEDURE — 250N000011 HC RX IP 250 OP 636: Performed by: EMERGENCY MEDICINE

## 2025-01-27 PROCEDURE — 85025 COMPLETE CBC W/AUTO DIFF WBC: CPT | Performed by: EMERGENCY MEDICINE

## 2025-01-27 PROCEDURE — 71046 X-RAY EXAM CHEST 2 VIEWS: CPT

## 2025-01-27 PROCEDURE — 94640 AIRWAY INHALATION TREATMENT: CPT | Performed by: EMERGENCY MEDICINE

## 2025-01-27 PROCEDURE — 36415 COLL VENOUS BLD VENIPUNCTURE: CPT | Performed by: EMERGENCY MEDICINE

## 2025-01-27 PROCEDURE — 96374 THER/PROPH/DIAG INJ IV PUSH: CPT | Performed by: EMERGENCY MEDICINE

## 2025-01-27 PROCEDURE — 250N000009 HC RX 250: Performed by: EMERGENCY MEDICINE

## 2025-01-27 PROCEDURE — 84155 ASSAY OF PROTEIN SERUM: CPT | Performed by: EMERGENCY MEDICINE

## 2025-01-27 PROCEDURE — 99285 EMERGENCY DEPT VISIT HI MDM: CPT | Performed by: EMERGENCY MEDICINE

## 2025-01-27 PROCEDURE — 71046 X-RAY EXAM CHEST 2 VIEWS: CPT | Mod: 26 | Performed by: RADIOLOGY

## 2025-01-27 PROCEDURE — 99285 EMERGENCY DEPT VISIT HI MDM: CPT | Mod: 25 | Performed by: EMERGENCY MEDICINE

## 2025-01-27 PROCEDURE — 85048 AUTOMATED LEUKOCYTE COUNT: CPT | Performed by: EMERGENCY MEDICINE

## 2025-01-27 RX ORDER — PREDNISONE 20 MG/1
TABLET ORAL
Qty: 10 TABLET | Refills: 0 | Status: SHIPPED | OUTPATIENT
Start: 2025-01-27

## 2025-01-27 RX ORDER — METHYLPREDNISOLONE SODIUM SUCCINATE 125 MG/2ML
125 INJECTION INTRAMUSCULAR; INTRAVENOUS ONCE
Status: COMPLETED | OUTPATIENT
Start: 2025-01-27 | End: 2025-01-27

## 2025-01-27 RX ORDER — ALBUTEROL SULFATE 90 UG/1
2 INHALANT RESPIRATORY (INHALATION) EVERY 6 HOURS PRN
Qty: 18 G | Refills: 0 | Status: SHIPPED | OUTPATIENT
Start: 2025-01-27

## 2025-01-27 RX ORDER — IPRATROPIUM BROMIDE AND ALBUTEROL SULFATE 2.5; .5 MG/3ML; MG/3ML
3 SOLUTION RESPIRATORY (INHALATION) ONCE
Status: COMPLETED | OUTPATIENT
Start: 2025-01-27 | End: 2025-01-27

## 2025-01-27 RX ADMIN — METHYLPREDNISOLONE SODIUM SUCCINATE 125 MG: 125 INJECTION, POWDER, FOR SOLUTION INTRAMUSCULAR; INTRAVENOUS at 23:55

## 2025-01-27 RX ADMIN — IPRATROPIUM BROMIDE AND ALBUTEROL SULFATE 3 ML: .5; 3 SOLUTION RESPIRATORY (INHALATION) at 23:55

## 2025-01-27 ASSESSMENT — ACTIVITIES OF DAILY LIVING (ADL)
ADLS_ACUITY_SCORE: 41
ADLS_ACUITY_SCORE: 41

## 2025-01-28 VITALS
OXYGEN SATURATION: 95 % | WEIGHT: 220 LBS | SYSTOLIC BLOOD PRESSURE: 99 MMHG | RESPIRATION RATE: 18 BRPM | TEMPERATURE: 97.7 F | HEART RATE: 80 BPM | DIASTOLIC BLOOD PRESSURE: 76 MMHG | HEIGHT: 65 IN | BODY MASS INDEX: 36.65 KG/M2

## 2025-01-28 PROBLEM — R05.1 ACUTE COUGH: Status: ACTIVE | Noted: 2025-01-28

## 2025-01-28 PROBLEM — J10.1 INFLUENZA A: Status: ACTIVE | Noted: 2025-01-28

## 2025-01-28 PROBLEM — R50.9 FEVER IN ADULT: Status: ACTIVE | Noted: 2025-01-28

## 2025-01-28 PROBLEM — R09.02 HYPOXIA: Status: ACTIVE | Noted: 2025-01-28

## 2025-01-28 PROBLEM — J44.1 COPD EXACERBATION (H): Status: ACTIVE | Noted: 2025-01-28

## 2025-01-28 LAB
ANION GAP SERPL CALCULATED.3IONS-SCNC: 12 MMOL/L (ref 7–15)
BUN SERPL-MCNC: 12.8 MG/DL (ref 8–23)
CALCIUM SERPL-MCNC: 9 MG/DL (ref 8.8–10.4)
CHLORIDE SERPL-SCNC: 101 MMOL/L (ref 98–107)
CREAT SERPL-MCNC: 0.79 MG/DL (ref 0.51–0.95)
EGFRCR SERPLBLD CKD-EPI 2021: 73 ML/MIN/1.73M2
ERYTHROCYTE [DISTWIDTH] IN BLOOD BY AUTOMATED COUNT: 13.2 % (ref 10–15)
FLUAV RNA SPEC QL NAA+PROBE: POSITIVE
FLUBV RNA RESP QL NAA+PROBE: NEGATIVE
GLUCOSE SERPL-MCNC: 203 MG/DL (ref 70–99)
HCO3 SERPL-SCNC: 25 MMOL/L (ref 22–29)
HCT VFR BLD AUTO: 40 % (ref 35–47)
HGB BLD-MCNC: 12.2 G/DL (ref 11.7–15.7)
MCH RBC QN AUTO: 27.5 PG (ref 26.5–33)
MCHC RBC AUTO-ENTMCNC: 30.5 G/DL (ref 31.5–36.5)
MCV RBC AUTO: 90 FL (ref 78–100)
PLATELET # BLD AUTO: 186 10E3/UL (ref 150–450)
POTASSIUM SERPL-SCNC: 4.1 MMOL/L (ref 3.4–5.3)
RBC # BLD AUTO: 4.43 10E6/UL (ref 3.8–5.2)
RSV RNA SPEC NAA+PROBE: NEGATIVE
SARS-COV-2 RNA RESP QL NAA+PROBE: NEGATIVE
SODIUM SERPL-SCNC: 138 MMOL/L (ref 135–145)
WBC # BLD AUTO: 3.4 10E3/UL (ref 4–11)

## 2025-01-28 PROCEDURE — 36415 COLL VENOUS BLD VENIPUNCTURE: CPT

## 2025-01-28 PROCEDURE — 250N000009 HC RX 250

## 2025-01-28 PROCEDURE — 99207 PR APP CREDIT; MD BILLING SHARED VISIT: CPT | Performed by: PHYSICIAN ASSISTANT

## 2025-01-28 PROCEDURE — 258N000003 HC RX IP 258 OP 636

## 2025-01-28 PROCEDURE — 120N000002 HC R&B MED SURG/OB UMMC

## 2025-01-28 PROCEDURE — 250N000009 HC RX 250: Performed by: EMERGENCY MEDICINE

## 2025-01-28 PROCEDURE — 80048 BASIC METABOLIC PNL TOTAL CA: CPT

## 2025-01-28 PROCEDURE — 85027 COMPLETE CBC AUTOMATED: CPT

## 2025-01-28 PROCEDURE — 250N000011 HC RX IP 250 OP 636

## 2025-01-28 PROCEDURE — 94640 AIRWAY INHALATION TREATMENT: CPT

## 2025-01-28 PROCEDURE — 94640 AIRWAY INHALATION TREATMENT: CPT | Mod: 76

## 2025-01-28 PROCEDURE — 999N000127 HC STATISTIC PERIPHERAL IV START W US GUIDANCE

## 2025-01-28 PROCEDURE — 999N000157 HC STATISTIC RCP TIME EA 10 MIN

## 2025-01-28 PROCEDURE — 82565 ASSAY OF CREATININE: CPT

## 2025-01-28 PROCEDURE — 99235 HOSP IP/OBS SAME DATE MOD 70: CPT | Mod: GC | Performed by: STUDENT IN AN ORGANIZED HEALTH CARE EDUCATION/TRAINING PROGRAM

## 2025-01-28 PROCEDURE — 82310 ASSAY OF CALCIUM: CPT

## 2025-01-28 PROCEDURE — 250N000013 HC RX MED GY IP 250 OP 250 PS 637

## 2025-01-28 RX ORDER — SODIUM CHLORIDE, SODIUM LACTATE, POTASSIUM CHLORIDE, CALCIUM CHLORIDE 600; 310; 30; 20 MG/100ML; MG/100ML; MG/100ML; MG/100ML
INJECTION, SOLUTION INTRAVENOUS CONTINUOUS
Status: DISCONTINUED | OUTPATIENT
Start: 2025-01-28 | End: 2025-01-28

## 2025-01-28 RX ORDER — ALBUTEROL SULFATE 0.83 MG/ML
2.5 SOLUTION RESPIRATORY (INHALATION) EVERY 6 HOURS PRN
Status: DISCONTINUED | OUTPATIENT
Start: 2025-01-28 | End: 2025-01-28

## 2025-01-28 RX ORDER — IPRATROPIUM BROMIDE AND ALBUTEROL SULFATE 2.5; .5 MG/3ML; MG/3ML
3 SOLUTION RESPIRATORY (INHALATION)
Status: DISCONTINUED | OUTPATIENT
Start: 2025-01-28 | End: 2025-01-29 | Stop reason: HOSPADM

## 2025-01-28 RX ORDER — VITAMIN B COMPLEX
50 TABLET ORAL DAILY
Status: CANCELLED | OUTPATIENT
Start: 2025-01-28

## 2025-01-28 RX ORDER — AMOXICILLIN 250 MG
2 CAPSULE ORAL 2 TIMES DAILY PRN
Status: DISCONTINUED | OUTPATIENT
Start: 2025-01-28 | End: 2025-01-29 | Stop reason: HOSPADM

## 2025-01-28 RX ORDER — METOPROLOL SUCCINATE 25 MG/1
25 TABLET, EXTENDED RELEASE ORAL DAILY
Status: CANCELLED | OUTPATIENT
Start: 2025-01-28

## 2025-01-28 RX ORDER — POLYETHYLENE GLYCOL 3350 17 G/17G
17 POWDER, FOR SOLUTION ORAL DAILY
Qty: 510 G | Refills: 0 | Status: SHIPPED | OUTPATIENT
Start: 2025-01-28

## 2025-01-28 RX ORDER — PREDNISONE 20 MG/1
40 TABLET ORAL DAILY
Qty: 4 TABLET | Refills: 0 | Status: SHIPPED | OUTPATIENT
Start: 2025-01-29

## 2025-01-28 RX ORDER — POLYETHYLENE GLYCOL 3350 17 G/17G
17 POWDER, FOR SOLUTION ORAL 2 TIMES DAILY PRN
Status: DISCONTINUED | OUTPATIENT
Start: 2025-01-28 | End: 2025-01-29 | Stop reason: HOSPADM

## 2025-01-28 RX ORDER — ESCITALOPRAM OXALATE 10 MG/1
20 TABLET ORAL DAILY
Status: CANCELLED | OUTPATIENT
Start: 2025-01-28

## 2025-01-28 RX ORDER — PREDNISONE 20 MG/1
40 TABLET ORAL DAILY
Status: DISCONTINUED | OUTPATIENT
Start: 2025-01-29 | End: 2025-01-29 | Stop reason: HOSPADM

## 2025-01-28 RX ORDER — PRAMIPEXOLE DIHYDROCHLORIDE 1 MG/1
2 TABLET ORAL AT BEDTIME
Status: CANCELLED | OUTPATIENT
Start: 2025-01-28

## 2025-01-28 RX ORDER — DOXYCYCLINE 100 MG/1
100 CAPSULE ORAL EVERY 12 HOURS SCHEDULED
Status: DISCONTINUED | OUTPATIENT
Start: 2025-01-28 | End: 2025-01-29 | Stop reason: HOSPADM

## 2025-01-28 RX ORDER — DOXYCYCLINE 100 MG/1
100 CAPSULE ORAL EVERY 12 HOURS
Qty: 9 CAPSULE | Refills: 0 | Status: SHIPPED | OUTPATIENT
Start: 2025-01-28 | End: 2025-02-02

## 2025-01-28 RX ORDER — MECLIZINE HYDROCHLORIDE 25 MG/1
25-50 TABLET ORAL 3 TIMES DAILY PRN
Status: CANCELLED | OUTPATIENT
Start: 2025-01-28

## 2025-01-28 RX ORDER — VIT A/VIT C/VIT E/ZINC/COPPER 2148-113
TABLET ORAL DAILY
Status: CANCELLED | OUTPATIENT
Start: 2025-01-28

## 2025-01-28 RX ORDER — ALBUTEROL SULFATE 0.63 MG/3ML
1 SOLUTION RESPIRATORY (INHALATION) EVERY 6 HOURS PRN
Status: DISCONTINUED | OUTPATIENT
Start: 2025-01-28 | End: 2025-01-28

## 2025-01-28 RX ORDER — ENOXAPARIN SODIUM 100 MG/ML
40 INJECTION SUBCUTANEOUS EVERY 24 HOURS
Status: DISCONTINUED | OUTPATIENT
Start: 2025-01-28 | End: 2025-01-29 | Stop reason: HOSPADM

## 2025-01-28 RX ORDER — ALBUTEROL SULFATE 0.63 MG/3ML
1 SOLUTION RESPIRATORY (INHALATION) EVERY 6 HOURS PRN
Qty: 270 ML | Refills: 0 | Status: SHIPPED | OUTPATIENT
Start: 2025-01-28

## 2025-01-28 RX ORDER — AMOXICILLIN 250 MG
1 CAPSULE ORAL 2 TIMES DAILY PRN
Status: DISCONTINUED | OUTPATIENT
Start: 2025-01-28 | End: 2025-01-29 | Stop reason: HOSPADM

## 2025-01-28 RX ORDER — ALBUTEROL SULFATE 90 UG/1
2 INHALANT RESPIRATORY (INHALATION) EVERY 6 HOURS PRN
Status: DISCONTINUED | OUTPATIENT
Start: 2025-01-28 | End: 2025-01-29 | Stop reason: HOSPADM

## 2025-01-28 RX ORDER — ACETAMINOPHEN 325 MG/1
650 TABLET ORAL EVERY 4 HOURS PRN
Status: DISCONTINUED | OUTPATIENT
Start: 2025-01-28 | End: 2025-01-29 | Stop reason: HOSPADM

## 2025-01-28 RX ORDER — TIOTROPIUM BROMIDE 18 UG/1
18 CAPSULE ORAL; RESPIRATORY (INHALATION) DAILY
Qty: 30 CAPSULE | Refills: 0 | Status: SHIPPED | OUTPATIENT
Start: 2025-01-28

## 2025-01-28 RX ORDER — LOSARTAN POTASSIUM 25 MG/1
25 TABLET ORAL DAILY
Status: CANCELLED | OUTPATIENT
Start: 2025-01-28

## 2025-01-28 RX ORDER — PREDNISONE 20 MG/1
40 TABLET ORAL DAILY
Status: DISCONTINUED | OUTPATIENT
Start: 2025-01-29 | End: 2025-01-28

## 2025-01-28 RX ORDER — OSELTAMIVIR PHOSPHATE 30 MG/1
30 CAPSULE ORAL 2 TIMES DAILY
Status: DISCONTINUED | OUTPATIENT
Start: 2025-01-28 | End: 2025-01-29 | Stop reason: HOSPADM

## 2025-01-28 RX ORDER — OSELTAMIVIR PHOSPHATE 75 MG/1
75 CAPSULE ORAL 2 TIMES DAILY
Status: DISCONTINUED | OUTPATIENT
Start: 2025-01-28 | End: 2025-01-28

## 2025-01-28 RX ORDER — TRAZODONE HYDROCHLORIDE 100 MG/1
100 TABLET ORAL
Status: CANCELLED | OUTPATIENT
Start: 2025-01-28

## 2025-01-28 RX ORDER — AMOXICILLIN 250 MG
1 CAPSULE ORAL 2 TIMES DAILY PRN
Qty: 30 TABLET | Refills: 0 | Status: SHIPPED | OUTPATIENT
Start: 2025-01-28

## 2025-01-28 RX ORDER — ALBUTEROL SULFATE 90 UG/1
2 INHALANT RESPIRATORY (INHALATION) EVERY 6 HOURS PRN
Qty: 6.7 G | Refills: 0 | Status: SHIPPED | OUTPATIENT
Start: 2025-01-28

## 2025-01-28 RX ORDER — ASPIRIN 325 MG
325 TABLET ORAL DAILY
Status: CANCELLED | OUTPATIENT
Start: 2025-01-28

## 2025-01-28 RX ORDER — IPRATROPIUM BROMIDE AND ALBUTEROL SULFATE 2.5; .5 MG/3ML; MG/3ML
3 SOLUTION RESPIRATORY (INHALATION) ONCE
Status: COMPLETED | OUTPATIENT
Start: 2025-01-28 | End: 2025-01-28

## 2025-01-28 RX ORDER — OSELTAMIVIR PHOSPHATE 30 MG/1
30 CAPSULE ORAL 2 TIMES DAILY
Qty: 8 CAPSULE | Refills: 0 | Status: SHIPPED | OUTPATIENT
Start: 2025-01-28 | End: 2025-02-01

## 2025-01-28 RX ADMIN — DOXYCYCLINE HYCLATE 100 MG: 100 CAPSULE ORAL at 08:18

## 2025-01-28 RX ADMIN — IPRATROPIUM BROMIDE AND ALBUTEROL SULFATE 3 ML: .5; 3 SOLUTION RESPIRATORY (INHALATION) at 00:43

## 2025-01-28 RX ADMIN — IPRATROPIUM BROMIDE AND ALBUTEROL SULFATE 3 ML: .5; 3 SOLUTION RESPIRATORY (INHALATION) at 07:50

## 2025-01-28 RX ADMIN — ENOXAPARIN SODIUM 40 MG: 40 INJECTION SUBCUTANEOUS at 08:18

## 2025-01-28 RX ADMIN — OSELTAMAVIR PHOSPHATE 30 MG: 30 CAPSULE ORAL at 08:18

## 2025-01-28 RX ADMIN — IPRATROPIUM BROMIDE AND ALBUTEROL SULFATE 3 ML: .5; 3 SOLUTION RESPIRATORY (INHALATION) at 16:00

## 2025-01-28 RX ADMIN — OSELTAMAVIR PHOSPHATE 30 MG: 30 CAPSULE ORAL at 02:53

## 2025-01-28 RX ADMIN — SODIUM CHLORIDE, POTASSIUM CHLORIDE, SODIUM LACTATE AND CALCIUM CHLORIDE: 600; 310; 30; 20 INJECTION, SOLUTION INTRAVENOUS at 02:47

## 2025-01-28 RX ADMIN — IPRATROPIUM BROMIDE AND ALBUTEROL SULFATE 3 ML: .5; 3 SOLUTION RESPIRATORY (INHALATION) at 12:00

## 2025-01-28 ASSESSMENT — ACTIVITIES OF DAILY LIVING (ADL)
ADLS_ACUITY_SCORE: 54
ADLS_ACUITY_SCORE: 41
ADLS_ACUITY_SCORE: 54
ADLS_ACUITY_SCORE: 41
ADLS_ACUITY_SCORE: 54
ADLS_ACUITY_SCORE: 41
ADLS_ACUITY_SCORE: 44
ADLS_ACUITY_SCORE: 54
ADLS_ACUITY_SCORE: 44
ADLS_ACUITY_SCORE: 44
ADLS_ACUITY_SCORE: 54
ADLS_ACUITY_SCORE: 54
ADLS_ACUITY_SCORE: 44
DEPENDENT_IADLS:: CLEANING;MEAL PREPARATION
ADLS_ACUITY_SCORE: 54
ADLS_ACUITY_SCORE: 54
ADLS_ACUITY_SCORE: 41
ADLS_ACUITY_SCORE: 41

## 2025-01-28 NOTE — DISCHARGE SUMMARY
"BP 99/76 (BP Location: Left arm)   Pulse 80   Temp 97.7  F (36.5  C) (Oral)   Resp 18   Ht 1.651 m (5' 5\")   Wt 99.8 kg (220 lb)   SpO2 95%   BMI 36.61 kg/m      [id number]    Patient A&Ox4 with intermittent forgetfulness, SBA with her walker, regular diet. Discharge instruction reviewed.  Patient verbalized understanding. PIV removed. Patient was in the lobby waiting for her ride when I got report, I brought her back to her room, she ordered dinner, and then a cab was called for her and she discharged home. Continue POC.   "

## 2025-01-28 NOTE — DISCHARGE INSTRUCTIONS
TODAY'S VISIT:  You were seen today for fever, cough  -   - If you had any labs or imaging/radiology tests performed today, you should also discuss these tests with your usual provider.     FOLLOW-UP:  Please make an appointment to follow up with:  - Your Primary Care Provider. If you do not have a PCP, please call the Primary Care Center (phone: [phone] for an appointment    - Have your provider review the results from today's visit with you again to make sure no further follow-up or additional testing is needed based on those results.     RETURN TO THE EMERGENCY DEPARTMENT  Return to the Emergency Department at any time for any new or worsening symptoms or any concerns.

## 2025-01-28 NOTE — ED TRIAGE NOTES
Patient arrives via ems from independent living facility with c/o dry cough, fever, shortness of breath, and body aches X 4 days. Patient last had tylenol this morning.      Triage Assessment (Adult)       Row Name 01/27/25 8855          Triage Assessment    Airway WDL WDL        Respiratory WDL    Respiratory WDL X;rhythm/pattern;cough     Rhythm/Pattern, Respiratory shortness of breath     Cough Frequency frequent     Cough Type dry        Skin Circulation/Temperature WDL    Skin Circulation/Temperature WDL WDL        Cardiac WDL    Cardiac WDL WDL        Peripheral/Neurovascular WDL    Peripheral Neurovascular WDL WDL        Cognitive/Neuro/Behavioral WDL    Cognitive/Neuro/Behavioral WDL WDL

## 2025-01-28 NOTE — CONSULTS
Care Management Initial Consult    General Information  Assessment completed with: Patient, VM-chart reviewJessica  Type of CM/SW Visit: Initial Assessment    Primary Care Provider verified and updated as needed: Yes (Nirali Barbour [phone] [address] [place] [address])   Readmission within the last 30 days: no previous admission in last 30 days      Reason for Consult: discharge planning  Advance Care Planning: Advance Care Planning Reviewed: questions answered, present on chart (POLST on file. ACP education and HCD documents provided)          Communication Assessment  Patient's communication style: spoken language (English or Bilingual)             Cognitive  Cognitive/Neuro/Behavioral: WDL                      Living Environment:   People in home: facility resident     Current living Arrangements: independent living facility      Able to return to prior arrangements: yes  Living Arrangement Comments: [place] apartment at [facility]    Family/Social Support:  Care provided by: self  Provides care for: no one  Marital Status:   Support system: Children, Facility resident(s)/Staff          Description of Support System: Supportive    Support Assessment: Adequate family and caregiver support    Current Resources:   Patient receiving home care services: No        Community Resources: None  Equipment currently used at home: walker, rolling, grab bar, tub/shower, grab bar, toilet, cane, quad (Walker is a seated walker)  Supplies currently used at home: None    Employment/Financial:  Employment Status: retired        Financial Concerns: other (see comments) (concerned with cost of higher LOC)   Referral to Financial Worker: No       Does the patient's insurance plan have a 3 day qualifying hospital stay waiver?  Yes     Which insurance plan 3 day waiver is available? Alternative insurance waiver    Will the waiver be used for post-acute placement? No    Lifestyle &  Psychosocial Needs:  Social Drivers of Health     Food Insecurity: Not on file   Depression: Not at risk (3/27/2023)    Received from [facility]    PHQ-2     PHQ-2 Score: 2   Housing Stability: Low Risk  (1/13/2021)    Received from [facility]    Housing Stability Vital Sign     Unable to Pay for Housing in the Last Year: No     Number of Places Lived in the Last Year: 1     Unstable Housing in the Last Year: No   Tobacco Use: Medium Risk (1/27/2025)    Patient History     Smoking Tobacco Use: Former     Smokeless Tobacco Use: Never     Passive Exposure: Not on file   Financial Resource Strain: Not on file   Alcohol Use: Not on file   Transportation Needs: Not on file   Physical Activity: Inactive (1/13/2021)    Received from [facility]    Exercise Vital Sign     Days of Exercise per Week: 0 days     Minutes of Exercise per Session: 0 min   Interpersonal Safety: Not At Risk (1/13/2021)    Received from [facility]    Humiliation, Afraid, Rape, and Kick questionnaire     Fear of Current or Ex-Partner: No     Emotionally Abused: No     Physically Abused: No     Sexually Abused: No   Stress: Stress Concern Present (1/13/2021)    Received from [facility]    [employer] [place] [employer] - Occupational Stress Questionnaire     Feeling of Stress : To some extent   Social Connections: Unknown (4/17/2024)    Received from [facility]    Social Connections     Frequency of Communication with Friends and Family: Not on file   Health Literacy: Not on file       Functional Status:  Prior to admission patient needed assistance:   Dependent ADLs:: Ambulation-walker  Dependent IADLs:: Cleaning, Meal Preparation (Facility provides meals and light housekeeping services. She is able to arrange her own transportation)  Assesssment of Functional Status: At functional baseline    Mental Health  "Status:  Mental Health Status: No Current Concerns       Chemical Dependency Status:  Chemical Dependency Status: No Current Concerns             Values/Beliefs:  Spiritual, Cultural Beliefs, Methodist Practices, Values that affect care: no          Values/Beliefs Comment: Alevism    Discussed  Partnership in Safe Discharge Planning  document with patient/family: No    Additional Information:      Per chart review: \"Jessica Ramsey (Judy) is an 84 year old female who has a history of COPD, CVA, asthma, hypertension, hyperlipidemia, aortic valve stenosis  and is admitted for AHRF 2/2 to influenza infection and COPD exacerbation. \" (Cristian Day MD; 1/28/2025)    SW spoke with [employer] who reported that pt may have new O2 needs and asked for SW assistance arranging it. MARCO confirmed walk test had been ordered    Care Management/Social Work Consult placed for discharge planning and possible home O2. SW performed chart review to begin assessment.    1125 SW met with Jessica at bedside to complete assessment and discuss home O2. SW introduced self and role, and explained the reason for the visit. Jessica agreed to speak with SW.    Jessica lives in an independent living apartment at [facility]. The facility provides her meals and light housekeeping. She is otherwise independent. She acknowledged that she could use help with medication management due to her forgetting about her medications sometimes. She expressed the concern about the cost difference between her current situation and the next higher LOC offered by the facility, assisted living.    Jessica's  was a  and she has / insurance alone with her Medicare Advantage plan. She reports that  does cover some of her costs, but is unclear how much coverage it would provide for assisted living.    Jessica's children [place] and are only able to provide emotional support. She does have friends who are able " "to provide emotional support, as well, but she explained that they are in the same age group as she and are unable to provide much practical support.    Jessica does not have a surrogate decision-maker and acknowledged that she needed to name one.  SW provided Advanced Care Planning (ACP) education which included information on Health Care Directives (HCD), how an HCD could be made  into a legal document, and how SW could facilitate document upload into pt's EHR once it was either witnessed or notarized.  SW also provided blank HCD documents for Jessica to review and/or complete.    SW discussed home O2, the walk test, arranging for delivery. Jessica voiced understanding.     SW offered to arrange discharge transportation after DME was delivered to her bedside. SW explained potential costs if her  didn't cover the ride. She voiced understanding and accepted the offer.      1215 Per conversation with [facility]  Zack, wheelchair transport was delayed until the morning.     Transport issue was escalated  Care Management leadership and /SW Manager Kassidy Quintana was able to arrange wheelchair transport for 2000.    Jessica \"passed\" her walk test and therefore did not qualify for home O2. Discharge medications were eventually sent of discharge pharmacy, but transport arrived at 1845 and pt discharged home without them. ED RN Vanesa agreed to have medications couriered to Jessica as soon as was possible.    CECILIO Cummings, [facility]  ED/OBS   [facility]  Phone: [phone]  Pager: [phone]  Fax: [phone]     After hours [employer] and After Hours [employer]     On-call pager, [phone], 4:00 pm to 8:30 pm                  "

## 2025-01-28 NOTE — PLAN OF CARE
Patient walked from room to garage, and then back to her room while maintaining O2 levels of 93-96%. Continue POC.

## 2025-01-28 NOTE — PLAN OF CARE
"Blood pressure 99/76, pulse 80, temperature 97.7  F (36.5  C), temperature source Oral, resp. rate 18, height 1.651 m (5' 5\"), weight 99.8 kg (220 lb), SpO2 (!) 90%.  Goal Outcome Evaluation:`  Pt. is A&Ox4 forgetful,up with SBA walked to bathroom,denied pain and nausea,good appetite,voided adequately,no BM.LS expiratory wheezing,and has nonproductive cough,on 2L O2 sats 96% on RA desats to 88%,has CASILLAS and SOB,but pt stated feels better,possible discharge home this evening.                        "

## 2025-01-28 NOTE — PROGRESS NOTES
Patient has been assessed for Home Oxygen needs. Oxygen readings:    *Pulse oximetry (SpO2) = 93% on room air at rest while awake.    *SpO2 improved to 96% on 0liters/minute at rest.    *SpO2 = 93% on room air during activity/with exercise.    *SpO2 improved to 96% on 0liters/minute during activity/with exercise.

## 2025-01-28 NOTE — ED PROVIDER NOTES
History     Chief Complaint   Patient presents with    Fever    Cough     HPI  Jessica Ramsey is a 84 year old female with a past medical history of COPD, parotitis, cellulitis of the leg, arthritis, CVA, asthma, hypertension, hyperlipidemia, aortic valve stenosis who presents to the emergency department with a chief complaint of fever.  The patient arrives via EMS from her independent living facility.  The patient reports that for the past 4 days, she has had fevers associated with cough and wheezing.  The patient reports she has a history of COPD and asthma.  She states that she has an albuterol inhaler at home, but has not been using it as she does not know where it is.  She has not recently been on any steroids or antibiotics.  No known recent sick contacts.    I have reviewed the Medications, Allergies, Past Medical and Surgical History, and Social History in the [employer] system.    Past Medical History:   Diagnosis Date    Arthritis     Asthma     Degenerative joint disease     Depression     Hoarseness     Hypertension     Indigestion     Nasal congestion     Night sweats     Nonrheumatic aortic valve stenosis 08/14/2019    Numbness     Pneumonia     Ringing in ears     Sleep problems     Snoring     Sore throat     Trouble swallowing     Unspecified cerebral artery occlusion with cerebral infarction     Weakness     Weight gain      Past Surgical History:   Procedure Laterality Date    BACK SURGERY      HYSTERECTOMY      ORTHOPEDIC SURGERY      AR SPINE SURGERY PROCEDURE UNLISTED      ZZC STOMACH SURGERY PROCEDURE UNLISTED       Current Facility-Administered Medications   Medication Dose Route Frequency Provider Last Rate Last Admin    lidocaine (PF) (XYLOCAINE) 1 % injection 4 mL  4 mL   Jill Fernandes MD   4 mL at 02/06/19 1101    lidocaine (PF) (XYLOCAINE) 1 % injection 4 mL  4 mL   DomJill sosa MD   4 mL at 02/06/19 1101    lidocaine 1 % injection 4 mL  4 mL   Dom  Jill Hawley MD   4 mL at 10/10/18 1506    lidocaine 1 % injection 4 mL  4 mL   Dom, Jill Hawley MD   4 mL at 10/10/18 1506    triamcinolone (KENALOG-40) injection 40 mg  40 mg   Vitor Mcneal, DO   40 mg at 09/03/19 1538    triamcinolone (KENALOG-40) injection 40 mg  40 mg   Vitor Mcneal, DO   40 mg at 09/03/19 1538    triamcinolone (KENALOG-40) injection 40 mg  40 mg   Dom, Jill Hawley MD   40 mg at 02/06/19 1101    triamcinolone (KENALOG-40) injection 40 mg  40 mg   Dom, Jill Hawley MD   40 mg at 02/06/19 1101    triamcinolone acetonide (KENALOG-40) injection 40 mg  40 mg   Dom, Jill Hawley MD   40 mg at 10/10/18 1506    triamcinolone acetonide (KENALOG-40) injection 40 mg  40 mg   [facility], Jill Hawley MD   40 mg at 10/10/18 1506     Current Outpatient Medications   Medication Sig Dispense Refill    acetaminophen (TYLENOL) 650 MG CR tablet Take 650 mg by mouth 4 times daily as needed.      albuterol (2.5 MG/3ML) 0.083% nebulizer solution Take 3 mLs by nebulization every 6 hours as needed for shortness of breath / dyspnea for 30 doses. 30 vial 2    albuterol (ACCUNEB) 0.63 MG/3ML nebulizer solution Take 3 mLs by nebulization every 6 hours as needed for shortness of breath / dyspnea. Use 1 unit dose in nebulizer every 4-6 hours as needed 1 Box 5    albuterol (PROAIR HFA/PROVENTIL HFA/VENTOLIN HFA) 108 (90 Base) MCG/ACT inhaler Inhale 2 puffs into the lungs every 6 hours as needed for shortness of breath / dyspnea or wheezing 6.7 g 0    aspirin 325 MG tablet Take 325 mg by mouth daily.      atorvastatin (LIPITOR) 40 MG tablet Take 40 mg by mouth      benzonatate (TESSALON) 100 MG capsule Take 1 capsule (100 mg) by mouth 3 times daily as needed for cough 30 capsule 0    CALCIUM & MAGNESIUM CARBONATES PO       cephALEXin (KEFLEX) 500 MG capsule Take 1 capsule (500 mg) by mouth 2 times daily 14 capsule 0    Cholecalciferol (VITAMIN D) 2000  "units tablet Take 1 tablet by mouth      ciprofloxacin (CIPRO) 500 MG tablet Take 1 tablet (500 mg) by mouth 2 times daily 6 tablet 0    COENZYME Q-10 PO       Escitalopram Oxalate (LEXAPRO PO) Take 20 mg by mouth daily      furosemide (LASIX) 20 MG tablet Take 25 mg by mouth daily      GABAPENTIN PO       losartan (COZAAR) 25 MG tablet Take 1 tablet (25 mg) by mouth daily for 30 days 30 tablet 0    meclizine (ANTIVERT) 25 MG tablet Take 1-2 tablets (25-50 mg) by mouth 3 times daily as needed for dizziness. 30 tablet 0    metoprolol succinate ER (TOPROL-XL) 25 MG 24 hr tablet TK 1 T PO  ONCE D  0    Multiple Vitamins-Minerals (PRESERVISION AREDS PO) Take 1 tablet by mouth daily      omeprazole (PRILOSEC) 20 MG capsule Take 1 capsule by mouth 2 times daily. 180 capsule 3    ORDER FOR DME Equipment being ordered: Nebulizer 1 each 0    phenazopyridine (PYRIDIUM) 200 MG tablet TK 1 T PO TID FOR 2 DAYS  0    pramipexole (MIRAPEX) 0.5 MG tablet Take 2 mg by mouth At Bedtime      Sertraline HCl (ZOLOFT PO) Take 150 mg by mouth daily       simvastatin (ZOCOR) 40 MG tablet Take 1 tablet by mouth At Bedtime. 90 tablet 3    Tiotropium Bromide Monohydrate (SPIRIVA HANDIHALER IN) Once daily inhale into lungs      traZODone (DESYREL) 100 MG tablet Take 1 tablet by mouth nightly as needed for sleep. As directed 30 tablet 5    triamcinolone (KENALOG) 0.1 % external cream Apply topically 2 times daily 80 g 0    vitamin D (ERGOCALCIFEROL) 11936 UNIT capsule Take 1 capsule (50,000 Units) by mouth once a week 12 capsule 1     Allergies   Allergen Reactions    Lyrica [Pregabalin]      \"felt like I was high and wanted to sleep a lot and did not help with the pain.\"    Malic Acid Other (See Comments)     Reaction: stomach cramps    Niacin      Rapid heartbeat    Seafood     Sulfa Antibiotics      Unknown    Tramadol      Dizziness     Azithromycin Rash    Naproxen Rash     Patient reported    Sulfamethoxazole-Trimethoprim Rash     Past " medical history, past surgical history, medications, and allergies were reviewed with the patient. Additional pertinent items: None    Social History     Socioeconomic History    Marital status:      Spouse name: Not on file    Number of children: Not on file    Years of education: Not on file    Highest education level: Not on file   Occupational History    Not on file   Tobacco Use    Smoking status: Former     Current packs/day: 1.00     Average packs/day: 1 pack/day for 18.0 years (18.0 ttl pk-yrs)     Types: Cigarettes    Smokeless tobacco: Never   Substance and Sexual Activity    Alcohol use: No    Drug use: No    Sexual activity: Never   Other Topics Concern    Parent/sibling w/ CABG, MI or angioplasty before 65F 55M? Not Asked   Social History Narrative    Not on file     Social Drivers of Health     Financial Resource Strain: Not on file   Food Insecurity: Not on file   Transportation Needs: Not on file   Physical Activity: Inactive (1/13/2021)    Received from [facility]    Exercise Vital Sign     Days of Exercise per Week: 0 days     Minutes of Exercise per Session: 0 min   Stress: Stress Concern Present (1/13/2021)    Received from [facility]    [employer] [place] [employer] - Occupational Stress Questionnaire     Feeling of Stress : To some extent   Social Connections: Unknown (4/17/2024)    Received from [facility]    Social Connections     Frequency of Communication with Friends and Family: Not on file   Interpersonal Safety: Not At Risk (1/13/2021)    Received from [facility]    Humiliation, Afraid, Rape, and Kick questionnaire     Fear of Current or Ex-Partner: No     Emotionally Abused: No     Physically Abused: No     Sexually Abused: No   Housing Stability: Low Risk  (1/13/2021)    Received from [facility]    Housing Stability Vital Sign     Unable to Pay for Housing  "in the Last Year: No     Number of Places Lived in the Last Year: 1     Unstable Housing in the Last Year: No     Social history was reviewed with the patient. Additional pertinent items: None    Review of Systems  A medically appropriate review of systems was performed with pertinent positives and negatives noted in the HPI, and all other systems negative.    Physical Exam   BP: 135/76  Pulse: 100  Temp: 100.4  F (38  C)  Resp: 20  Height: 165.1 cm (5' 5\")  Weight: 99.8 kg (220 lb)  SpO2: 93 %      General: Well nourished, well developed, NAD  HEENT: EOMI, anicteric. NCAT, MMM  Neck: no jugular venous distension, supple, nl ROM  Cardiac: Regular rate and rhythm. No murmurs, rubs, or gallops. Normal S1, S2.  Intact peripheral pulses  Pulm: Bilateral wheezes present, no rales  Abd: Soft, nontender, nondistended.  No masses palpated.    Skin: Warm and dry to the touch.  No rash  Extremities: No LE edema, no cyanosis, w/w/p  Neuro: A&Ox3, no gross focal deficits    ED Course        Procedures                        Labs Ordered and Resulted from Time of ED Arrival to Time of ED Departure   COMPREHENSIVE METABOLIC PANEL - Abnormal       Result Value    Sodium 137      Potassium 3.9      Carbon Dioxide (CO2) 26      Anion Gap 11      Urea Nitrogen 11.2      Creatinine 0.96 (*)     GFR Estimate 58 (*)     Calcium 8.5 (*)     Chloride 100      Glucose 125 (*)     Alkaline Phosphatase 96      AST 17      ALT 9      Protein Total 7.0      Albumin 4.0      Bilirubin Total 0.2     INFLUENZA A/B, RSV AND SARS-COV2 PCR - Abnormal    Influenza A PCR Positive (*)     Influenza B PCR Negative      RSV PCR Negative      SARS CoV2 PCR Negative     CBC WITH PLATELETS AND DIFFERENTIAL - Abnormal    WBC Count 5.3      RBC Count 4.45      Hemoglobin 12.7      Hematocrit 39.5      MCV 89      MCH 28.5      MCHC 32.2      RDW 13.2      Platelet Count 199      % Neutrophils 77      % Lymphocytes 9      % Monocytes 13      % Eosinophils 0   "    % Basophils 1      % Immature Granulocytes 1      NRBCs per 100 WBC 0      Absolute Neutrophils 4.1      Absolute Lymphocytes 0.5 (*)     Absolute Monocytes 0.7      Absolute Eosinophils 0.0      Absolute Basophils 0.0      Absolute Immature Granulocytes 0.0      Absolute NRBCs 0.0     LACTIC ACID WHOLE BLOOD WITH 1X REPEAT IN 2 HR WHEN >2 - Normal    Lactic Acid, Initial 0.8     ROUTINE UA WITH MICROSCOPIC REFLEX TO CULTURE   BLOOD CULTURE   BLOOD CULTURE            Results for orders placed or performed during the hospital encounter of 01/27/25 (from the past 24 hours)   CBC with platelets differential    Narrative    The following orders were created for panel order CBC with platelets differential.  Procedure                               Abnormality         Status                     ---------                               -----------         ------                     CBC with platelets and d...[[id number]]  Abnormal            Final result                 Please view results for these tests on the individual orders.   Comprehensive metabolic panel   Result Value Ref Range    Sodium 137 135 - 145 mmol/L    Potassium 3.9 3.4 - 5.3 mmol/L    Carbon Dioxide (CO2) 26 22 - 29 mmol/L    Anion Gap 11 7 - 15 mmol/L    Urea Nitrogen 11.2 8.0 - 23.0 mg/dL    Creatinine 0.96 (H) 0.51 - 0.95 mg/dL    GFR Estimate 58 (L) >60 mL/min/1.73m2    Calcium 8.5 (L) 8.8 - 10.4 mg/dL    Chloride 100 98 - 107 mmol/L    Glucose 125 (H) 70 - 99 mg/dL    Alkaline Phosphatase 96 40 - 150 U/L    AST 17 0 - 45 U/L    ALT 9 0 - 50 U/L    Protein Total 7.0 6.4 - 8.3 g/dL    Albumin 4.0 3.5 - 5.2 g/dL    Bilirubin Total 0.2 <=1.2 mg/dL   Influenza A/B, RSV and SARS-CoV2 PCR (COVID-19) Nasopharyngeal    Specimen: Nasopharyngeal; Swab   Result Value Ref Range    Influenza A PCR Positive (A) Negative    Influenza B PCR Negative Negative    RSV PCR Negative Negative    SARS CoV2 PCR Negative Negative    Narrative    Testing was performed using  the Xpert Xpress CoV2/Flu/RSV Assay on the [employer] GeneXpert Instrument. This test should be ordered for the detection of SARS-CoV2, influenza, and RSV viruses in individuals with signs and symptoms of respiratory tract infection. This test is for in vitro diagnostic use under the [place] FDA for laboratories certified under CLIA to perform high or moderate complexity testing. This test has been [place] FDA cleared. A negative result does not rule out the presence of PCR inhibitors in the specimen or target RNA in concentration below the limit of detection for the assay. If only one viral target is positive but coinfection with multiple targets is suspected, the sample should be re-tested with another FDA cleared, approved, or authorized test, if coninfection would change clinical management. This test was validated by the [facility]. These laboratories are certified under the Clinical Laboratory Improvement Amendments of 1988 (CLIA-88) as qualified to perfom high complexity laboratory testing.   Lactic acid whole blood with 1x repeat in 2 hr when >2   Result Value Ref Range    Lactic Acid, Initial 0.8 0.7 - 2.0 mmol/L   [facility] [place] Draw    Narrative    The following orders were created for panel order [facility].  Procedure                               Abnormality         Status                     ---------                               -----------         ------                     Extra Red Top Tube[[id number]]                               Final result                 Please view results for these tests on the individual orders.   CBC with platelets and differential   Result Value Ref Range    WBC Count 5.3 4.0 - 11.0 10e3/uL    RBC Count 4.45 3.80 - 5.20 10e6/uL    Hemoglobin 12.7 11.7 - 15.7 g/dL    Hematocrit 39.5 35.0 - 47.0 %    MCV 89 78 - 100 fL    MCH 28.5 26.5 - 33.0 pg    MCHC 32.2 31.5 - 36.5 g/dL    RDW 13.2 10.0 - 15.0 %    Platelet Count 199 150 - 450 10e3/uL    % Neutrophils 77 %    %  Lymphocytes 9 %    % Monocytes 13 %    % Eosinophils 0 %    % Basophils 1 %    % Immature Granulocytes 1 %    NRBCs per 100 WBC 0 <1 /100    Absolute Neutrophils 4.1 1.6 - 8.3 10e3/uL    Absolute Lymphocytes 0.5 (L) 0.8 - 5.3 10e3/uL    Absolute Monocytes 0.7 0.0 - 1.3 10e3/uL    Absolute Eosinophils 0.0 0.0 - 0.7 10e3/uL    Absolute Basophils 0.0 0.0 - 0.2 10e3/uL    Absolute Immature Granulocytes 0.0 <=0.4 10e3/uL    Absolute NRBCs 0.0 10e3/uL   Extra Red Top Tube   Result Value Ref Range    Hold Specimen JIC    XR Chest 2 Views    Narrative    EXAM: XR CHEST 2 VIEWS  LOCATION: [facility]  DATE: 1/27/2025    INDICATION: cough, fever  COMPARISON: None.      Impression    IMPRESSION: Negative chest.       Labs, vital signs, and imaging studies were reviewed by me.    Medications   ipratropium - albuterol 0.5 mg/2.5 mg/3 mL (DUONEB) neb solution 3 mL (has no administration in time range)   ipratropium - albuterol 0.5 mg/2.5 mg/3 mL (DUONEB) neb solution 3 mL (3 mLs Nebulization $Given 1/27/25 2355)   methylPREDNISolone Na Suc (solu-MEDROL) injection 125 mg (125 mg Intravenous $Given 1/27/25 2355)       Assessments & Plan (with Medical Decision Making)   Jessica Ramsey is a 84 year old female who resents to the emergency department with cough and fever.  Differential diagnosis includes COVID, influenza, RSV, other viral syndrome, pneumonia, bronchitis.  Oxygen saturation maintained in the mid 90s on room air.  Labs, chest x-ray ordered to further evaluate the patient in the emergency department.  Medications given for symptomatic relief.    Laboratory workup is remarkable for normal white blood cell count, normal hemoglobin.  CMP is unremarkable.  Influenza A testing is positive, RSV and COVID testing are negative.  Patient is outside of the window for treatment with Tamiflu.    Chest x-ray shows clear lungs.    Critical care was not performed.     Medical Decision  Making  The patient's presentation was of moderate complexity (an acute illness with systemic symptoms).    The patient's evaluation involved:  ordering and/or review of 3+ test(s) in this encounter (see separate area of note for details)  independent interpretation of testing performed by another health professional (see separate area of note for details)    The patient's management necessitated moderate risk (prescription drug management including medications given in the ED), moderate risk (limitations due to social determinants of health (see separate area of note for details. )), and high risk (a decision regarding hospitalization).    Chest x-ray images were personally reviewed by me, I agree with the radiology reads.    I have reviewed the nursing notes.    I have reviewed the findings, diagnosis, plan and need for follow up with the patient.    Patient noted to desat down to 86% while sleeping.  She is not on home oxygen.  Patient be placed back on nasal cannula and plan to admit to internal medicine.    Patient to be admitted to internal medicine for further management. Plan was discussed with patient who understands and agrees with plan.         New Prescriptions    No medications on file       Final diagnoses:   Fever in adult   Acute cough   COPD exacerbation (H)   Influenza A   Hypoxia       NOLVIA DIETZ MD  1/27/2025   [facility] EMERGENCY DEPARTMENT       Nolvia Dietz MD  01/28/25 0024       Nolvia Dietz MD  01/28/25 0047

## 2025-01-28 NOTE — H&P
[facility]    History and Physical - Hospitalist Service, GOLD TEAM        Date of Admission:  1/27/2025    Assessment & Plan      Jessica Ramsey is a 84 year old female who has a history of COPD, CVA, asthma, hypertension, hyperlipidemia, aortic valve stenosis  and is admitted for AHRF 2/2 to influenza infection and COPD exacerbation.    #AHRF  #COPD exacerbation  #Infuenza infection  Patient presents with acute dyspnea, O2 requirement of 2L. Endorses fever, cough, and headache. CXR clear. Test positive for influenza infection, bilateral wheezing on exam. Most likely cause is COPD exacerbation 2/2 to acute influenza infection. Significant improvement in patient subjective WOB after solumedrol was given in ED. Will continue 5 day course of steroids, initiate doxycycline (patient allergic to azithromycin).   - S/p 125mg IV solumedrol  - Start daily 40mg prednisone 1/29 for 4 days for 5 day total steroid course  - Duoneb QID, consider making PRN after a few scheduled doses if improvement in respiratory status  - Albuterol PRN  - Tamiflu BID  - Doxycycline (1/28/25- present)  - LR @100 mL/hr for 10 hours  - Tylenol PRN for fever and headache  - Repeat BMP, CBC in AM    #Hx of CVA  Occurred in 2010. No ongoing neurological deficits.    #Hypertension  #Hyperlipidemia  - Not currently taking medications    Diet: Combination Diet Regular Diet Adult    DVT Prophylaxis: Enoxaparin (Lovenox) SQ  Code Status: No CPR- Do NOT Intubate      Cristian Day MD  Hospitalist Service, GOLD TEAM   [facility]  Securely message with MyMusic (more info)  Text page via [employer] Paging/Directory   See signed in provider for up to date coverage information  ______________________________________________________________________    History of Present Illness   Jessica Ramsey is a 84 year old female who has a history of COPD, CVA, asthma, hypertension,  hyperlipidemia, aortic valve stenosis  and is admitted for AHRF 2/2 to influenza infection and COPD exacerbation.    Patient states that for the last 4 days she has had a dry cough, fevers, wheezing, and headache. Started feeling more acutely ill and lightheaded so she came into the ED. Does not take any of her prescribed medications as she has felt generally healthy lately. Has albuterol and Spiriva inhaler at home but has not tried using it. Denies chest pain, abdominal pain, N/V, dysuria, diarrhea, chills, sputum production. Does not require oxygen at baseline at home.    In the ED, patient was vitally stable, febrile w/ 100.4 temp. Satting well on RA. Was about to be discharged when sats decreased to mid 80s and she was started on 2L NC. WBC 5.3, LA 0.8. CXR was clear. Was found to be influenza positive. Was given IV solumedrol dose and duoneb with significant improvement in patients subjective work of breathing.    Past Medical History    Past Medical History:   Diagnosis Date    Arthritis     Asthma     Degenerative joint disease     Depression     Hoarseness     Hypertension     Indigestion     Nasal congestion     Night sweats     Nonrheumatic aortic valve stenosis 08/14/2019    Numbness     Pneumonia     Ringing in ears     Sleep problems     Snoring     Sore throat     Trouble swallowing     Unspecified cerebral artery occlusion with cerebral infarction     Weakness     Weight gain        Past Surgical History   Past Surgical History:   Procedure Laterality Date    BACK SURGERY      HYSTERECTOMY      ORTHOPEDIC SURGERY      MI SPINE SURGERY PROCEDURE UNLISTED      ZZC STOMACH SURGERY PROCEDURE UNLISTED         Prior to Admission Medications   Prior to Admission Medications   Prescriptions Last Dose Informant Patient Reported? Taking?   CALCIUM & MAGNESIUM CARBONATES PO   Yes No   COENZYME Q-10 PO   Yes No   Cholecalciferol (VITAMIN D) 2000 units tablet   Yes No   Sig: Take 1 tablet by mouth   Escitalopram  Oxalate (LEXAPRO PO)   Yes No   Sig: Take 20 mg by mouth daily   GABAPENTIN PO  Self Yes No   Multiple Vitamins-Minerals (PRESERVISION AREDS PO)   Yes No   Sig: Take 1 tablet by mouth daily   ORDER FOR DME   No No   Sig: Equipment being ordered: Nebulizer   Sertraline HCl (ZOLOFT PO)   Yes No   Sig: Take 150 mg by mouth daily    Tiotropium Bromide Monohydrate (SPIRIVA HANDIHALER IN)   Yes No   Sig: Once daily inhale into lungs   acetaminophen (TYLENOL) 650 MG CR tablet   Yes No   Sig: Take 650 mg by mouth 4 times daily as needed.   albuterol (2.5 MG/3ML) 0.083% nebulizer solution   No No   Sig: Take 3 mLs by nebulization every 6 hours as needed for shortness of breath / dyspnea for 30 doses.   albuterol (ACCUNEB) 0.63 MG/3ML nebulizer solution   No No   Sig: Take 3 mLs by nebulization every 6 hours as needed for shortness of breath / dyspnea. Use 1 unit dose in nebulizer every 4-6 hours as needed   albuterol (PROAIR HFA/PROVENTIL HFA/VENTOLIN HFA) 108 (90 Base) MCG/ACT inhaler   No No   Sig: Inhale 2 puffs into the lungs every 6 hours as needed for shortness of breath / dyspnea or wheezing   aspirin 325 MG tablet   Yes No   Sig: Take 325 mg by mouth daily.   atorvastatin (LIPITOR) 40 MG tablet   Yes No   Sig: Take 40 mg by mouth   benzonatate (TESSALON) 100 MG capsule   No No   Sig: Take 1 capsule (100 mg) by mouth 3 times daily as needed for cough   cephALEXin (KEFLEX) 500 MG capsule   No No   Sig: Take 1 capsule (500 mg) by mouth 2 times daily   ciprofloxacin (CIPRO) 500 MG tablet   No No   Sig: Take 1 tablet (500 mg) by mouth 2 times daily   furosemide (LASIX) 20 MG tablet   Yes No   Sig: Take 25 mg by mouth daily   losartan (COZAAR) 25 MG tablet   No No   Sig: Take 1 tablet (25 mg) by mouth daily for 30 days   meclizine (ANTIVERT) 25 MG tablet   No No   Sig: Take 1-2 tablets (25-50 mg) by mouth 3 times daily as needed for dizziness.   metoprolol succinate ER (TOPROL-XL) 25 MG 24 hr tablet   Yes No   Sig: TK 1 T  PO  ONCE D   omeprazole (PRILOSEC) 20 MG capsule   No No   Sig: Take 1 capsule by mouth 2 times daily.   phenazopyridine (PYRIDIUM) 200 MG tablet   Yes No   Sig: TK 1 T PO TID FOR 2 DAYS   pramipexole (MIRAPEX) 0.5 MG tablet   Yes No   Sig: Take 2 mg by mouth At Bedtime   simvastatin (ZOCOR) 40 MG tablet   No No   Sig: Take 1 tablet by mouth At Bedtime.   traZODone (DESYREL) 100 MG tablet   No No   Sig: Take 1 tablet by mouth nightly as needed for sleep. As directed   triamcinolone (KENALOG) 0.1 % external cream   No No   Sig: Apply topically 2 times daily   vitamin D (ERGOCALCIFEROL) 01605 UNIT capsule   No No   Sig: Take 1 capsule (50,000 Units) by mouth once a week      Facility-Administered Medications Last Administration Doses Remaining   lidocaine (PF) (XYLOCAINE) 1 % injection 4 mL 2/6/2019 11:01 AM    lidocaine (PF) (XYLOCAINE) 1 % injection 4 mL 2/6/2019 11:01 AM    lidocaine 1 % injection 4 mL 10/10/2018  3:06 PM    lidocaine 1 % injection 4 mL 10/10/2018  3:06 PM    triamcinolone (KENALOG-40) injection 40 mg 2/6/2019 11:01 AM    triamcinolone (KENALOG-40) injection 40 mg 2/6/2019 11:01 AM    triamcinolone (KENALOG-40) injection 40 mg 9/3/2019  3:38 PM    triamcinolone (KENALOG-40) injection 40 mg 9/3/2019  3:38 PM    triamcinolone acetonide (KENALOG-40) injection 40 mg 10/10/2018  3:06 PM    triamcinolone acetonide (KENALOG-40) injection 40 mg 10/10/2018  3:06 PM          Physical Exam   Vital Signs: Temp: 100.4  F (38  C) Temp src: Oral BP: 108/66 Pulse: 97   Resp: 18 SpO2: 92 % O2 Device: Nasal cannula Oxygen Delivery: 2 LPM  Weight: 220 lbs 0 oz    General: Well nourished, well developed, NAD  Cardiac: Regular rate and rhythm. No murmurs, rubs, or gallops. Normal S1, S2.  Pulm: Bilateral diffuse wheezes present, no crackles  Abd: Soft, nontender, nondistended.   Extremities: No LE edema, no cyanosis, w/w/p  Neuro: A&Ox3, no gross focal deficits    Data     I have personally reviewed the following data  over the past 24 hrs:    5.3  \   12.7   / 199     137 100 11.2 /  125 (H)   3.9 26 0.96 (H) \     ALT: 9 AST: 17 AP: 96 TBILI: 0.2   ALB: 4.0 TOT PROTEIN: 7.0 LIPASE: N/A     Procal: N/A CRP: N/A Lactic Acid: 0.8         Imaging results reviewed over the past 24 hrs:   Recent Results (from the past 24 hours)   XR Chest 2 Views    Narrative    EXAM: XR CHEST 2 VIEWS  LOCATION: [facility]  DATE: 1/27/2025    INDICATION: cough, fever  COMPARISON: None.      Impression    IMPRESSION: Negative chest.

## 2025-01-28 NOTE — DISCHARGE SUMMARY
"[facility]  Hospitalist Discharge Summary      Date of Admission:  1/27/2025  Date of Discharge:  1/28/2025  Discharging Provider: Lilia Garcia PA-C  Discharge Service: Hospitalist Service, GOLD TEAM 11    Discharge Diagnoses   Acute hypoxic respiratory failure  Influenza A  Suspect COPD exacerbation  Hx remote CVA  HTN  HLD  Memory deficits    Clinically Significant Risk Factors     # Obesity: Estimated body mass index is 36.61 kg/m  as calculated from the following:    Height as of this encounter: 1.651 m (5' 5\").    Weight as of this encounter: 99.8 kg (220 lb).       Follow-ups Needed After Discharge   Follow-up Appointments       Adult [facility] Follow-up and recommended labs and tests      Follow up with primary care provider, Nirali Barbour, within 7 days for hospital follow- up.        Appointments on [place] and/or [facility] (with [facility] or [facility] provider or service). Call [phone] if you haven't heard regarding these appointments within 7 days of discharge.                Unresulted Labs Ordered in the Past 30 Days of this Admission       Date and Time Order Name Status Description    1/27/2025  9:51 PM Blood Culture Arm, Left Preliminary     1/27/2025  9:51 PM Blood Culture Arm, Left Preliminary         These results will be followed up by our team    Discharge Disposition   Discharged to home  Condition at discharge: Stable    Hospital Course    In brief:   Jessica Ramsey is a 84 year old female who has a history of COPD, CVA, asthma, hypertension, hyperlipidemia, aortic valve stenosis and [facility] and [facility] exacerbation. She was managed as per below and met criteria to discharge back to [facility] 1/28/25.    -------------------------------------------------  Problem-based course:    #Acute hypoxic respiratory failure  #COPD exacerbation  #Infuenza A infection  Patient presents with acute dyspnea, O2 requirement " of 2L. Endorses fever, cough, and headache. CXR clear. Test positive for influenza A (neg RSV, COVID) infection, bilateral wheezing on exam. Most likely cause is COPD exacerbation 2/2 to acute influenza infection. Given LR @100 mL/hr for 10 hours. Significant improvement in patient subjective WOB after solumedrol was given in ED. Will continue 5 day course of steroids, initiate doxycycline (patient allergic to azithromycin).   - steroids: S/p 125mg IV solumedrol: daily 40mg prednisone 1/29 for 4 days for 5 day total steroid course  - refilled tiotropium, albuterol neb and HFA  - Albuterol PRN  - Tamiflu BID for total 5d  - Doxycycline (1/28/25 x 5d)  - Tylenol PRN for fever and headache    # Memory concerns  Trouble remembering to take medications d/w patient.  Patient d/w SW and plan to continue cares at [facility]    #Hx of CVA- Occurred in 2010. No ongoing neurological deficits.    #Hypertension  #Hyperlipidemia  - continue PTA statin   - continue PTA metoprolol and losartan     Diet: Combination Diet Regular Diet Adult    DVT Prophylaxis: Enoxaparin (Lovenox) SQ  Code Status: No CPR- Do NOT Intubate      ______________________________________________________________________    Consultations This Hospital Stay   NURSING TO CONSULT FOR VASCULAR ACCESS CARE IP CONSULT  SOCIAL WORK IP CONSULT    Code Status   No CPR- Do NOT Intubate    Time Spent on this Encounter   ILilia PA-C, personally saw the patient today and spent greater than 30 minutes discharging this patient.     Lilia Garcia PA-C  [facility] EMERGENCY DEPARTMENT  [address] [address]  Phone: [phone]  ______________________________________________________________________    Physical Exam   Vital Signs: Temp: 97.7  F (36.5  C) Temp src: Oral BP: 99/76 Pulse: 80   Resp: 18 SpO2: 95 % O2 Device: Nasal cannula Oxygen Delivery: 2 LPM  Weight: 220 lbs 0 oz  GENERAL: Alert and oriented x 3. NAD. Able to sit upright  unassisted. Eating breakfast w/o issues. Cooperative.   HEENT: Anicteric sclera. Mucous membranes moist. NC. AT. Pupils equal and round  CV: RRR. S1, S2. No murmurs appreciated.   RESPIRATORY: + mild bilateral upper lobe wheezing. Effort normal on RA at rest.   GI: Abdomen soft, NT, NABS   NEUROLOGICAL: No focal deficits. Moves all extremities against gravity.  EXTREMITIES: No peripheral edema. Intact bilateral pedal pulses.   SKIN: No jaundice. No rashes on exposed skin.  BACK: no CVA tenderness, no spinous tenderness    Primary Care Physician   Nirali Barbour    Discharge Orders      Reason for your hospital stay    Shortness of breath due to influenza and potential COPD exacerbation     Activity    Your activity upon discharge: activity as tolerated and no driving for today. No driving if experiencing significant pain, debility or memory concerns.     Adult [facility] Follow-up and recommended labs and tests    Follow up with primary care provider, Nirali Barbour, within 7 days for hospital follow- up.        Appointments on [place] and/or [facility] (with [facility] or [facility] provider or service). Call [phone] if you haven't heard regarding these appointments within 7 days of discharge.     Monitor and record    Check your temperature if you have a fever  Drink plenty of fluids and ensure you are urinating at least 4 times daily     When to contact your care team    Call your primary doctor or go to the ER if you have any of the following:  increased shortness of breath, increased drainage, increased swelling, increased pain, or any new or concerning symptoms.     Diet    Follow this diet upon discharge: Current Diet:Orders Placed This Encounter      Combination Diet Regular Diet Adult       Significant Results and Procedures   Most Recent 3 CBC's:  Recent Labs   Lab Test 01/28/25  0720 01/27/25  2158 11/01/24  1709   WBC 3.4* 5.3 7.8   HGB 12.2 12.7 13.0   MCV 90 89 91    199 269     Most Recent 3  BMP's:  Recent Labs   Lab Test 01/28/25  0720 01/27/25  2158 11/01/24  1713 11/01/24  1709    137  --  139   POTASSIUM 4.1 3.9  --  4.6   CHLORIDE 101 100  --  102   CO2 25 26  --  29   BUN 12.8 11.2  --  17.3   CR 0.79 0.96* 0.9 0.79   ANIONGAP 12 11  --  8   ESTEFANIA 9.0 8.5*  --  9.2   * 125*  --  100*     7-Day Micro Results       Collected Updated Procedure Result Status      01/27/2025 2241 01/28/2025 1116 Blood Culture Arm, Left [[id number]]   Blood from Arm, Left    Preliminary result Component Value   Culture No growth after 12 hours  [P]                01/27/2025 2158 01/28/2025 0013 Influenza A/B, RSV and SARS-CoV2 PCR (COVID-19) Nasopharyngeal [[id number]]    (Abnormal)   Swab from Nasopharyngeal    Final result Component Value   Influenza A PCR Positive   Influenza B PCR Negative   RSV PCR Negative   SARS CoV2 PCR Negative   NEGATIVE: SARS-CoV-2 (COVID-19) RNA not detected, presumed negative.            01/27/2025 2158 01/28/2025 1131 Blood Culture Arm, Left [[id number]]   Blood from Arm, Left    Preliminary result Component Value   Culture No growth after 12 hours  [P]                      Most Recent 6 glucoses:  Recent Labs   Lab Test 01/28/25  0720 01/27/25  2158 11/01/24  1709 11/01/24  1653 04/04/24  0601 12/04/23  1408   * 125* 100* 102* 110* 148*       Discharge Medications   Current Discharge Medication List        START taking these medications    Details   doxycycline hyclate (VIBRAMYCIN) 100 MG capsule Take 1 capsule (100 mg) by mouth every 12 hours for 9 doses.  Qty: 9 capsule, Refills: 0    Associated Diagnoses: Fever in adult; COPD exacerbation (H)      oseltamivir (TAMIFLU) 30 MG capsule Take 1 capsule (30 mg) by mouth 2 times daily for 4 days.  Qty: 8 capsule, Refills: 0    Associated Diagnoses: COPD exacerbation (H); Influenza A      polyethylene glycol (MIRALAX) 17 GM/Dose powder Take 17 g by mouth daily.  Qty: 510 g, Refills: 0    Associated Diagnoses:  Constipation, unspecified constipation type      !! predniSONE (DELTASONE) 20 MG tablet Take 2 tablets (40 mg) by mouth daily.  Qty: 4 tablet, Refills: 0    Associated Diagnoses: COPD exacerbation (H); Influenza A      !! predniSONE (DELTASONE) 20 MG tablet Take two tablets (= 40mg) each day for 5 (five) days  Qty: 10 tablet, Refills: 0      senna-docusate (SENOKOT-S/PERICOLACE) 8.6-50 MG tablet Take 1 tablet by mouth 2 times daily as needed for constipation.  Qty: 30 tablet, Refills: 0    Associated Diagnoses: Constipation, unspecified constipation type       !! - Potential duplicate medications found. Please discuss with provider.        CONTINUE these medications which have CHANGED    Details   albuterol (ACCUNEB) 0.63 MG/3ML neb solution Take 3 mLs (0.63 mg) by nebulization every 6 hours as needed for shortness of breath. Use 1 unit dose in nebulizer every 4-6 hours as needed  Qty: 270 mL, Refills: 0    Associated Diagnoses: COPD exacerbation (H); Influenza A      !! albuterol (PROAIR HFA/PROVENTIL HFA/VENTOLIN HFA) 108 (90 Base) MCG/ACT inhaler Inhale 2 puffs into the lungs every 6 hours as needed for shortness of breath or wheezing.  Qty: 6.7 g, Refills: 0    Comments: Pharmacy may dispense brand covered by insurance (Proair, or proventil or ventolin or generic albuterol inhaler)  Associated Diagnoses: COPD exacerbation (H); Moderate persistent asthma, unspecified whether complicated      !! albuterol (PROAIR HFA/PROVENTIL HFA/VENTOLIN HFA) 108 (90 Base) MCG/ACT inhaler Inhale 2 puffs into the lungs every 6 hours as needed for shortness of breath, wheezing or cough.  Qty: 18 g, Refills: 0    Comments: Pharmacy may dispense brand covered by insurance (Proair, or proventil or ventolin or generic albuterol inhaler)       !! - Potential duplicate medications found. Please discuss with provider.        CONTINUE these medications which have NOT CHANGED    Details   atorvastatin (LIPITOR) 40 MG tablet Take 40 mg by  mouth      Sertraline HCl (ZOLOFT PO) Take 50 mg by mouth daily.      acetaminophen (TYLENOL) 650 MG CR tablet Take 650 mg by mouth 4 times daily as needed.      albuterol (2.5 MG/3ML) 0.083% nebulizer solution Take 3 mLs by nebulization every 6 hours as needed for shortness of breath / dyspnea for 30 doses.  Qty: 30 vial, Refills: 2    Associated Diagnoses: Wheezing      aspirin 325 MG tablet Take 325 mg by mouth daily.      benzonatate (TESSALON) 100 MG capsule Take 1 capsule (100 mg) by mouth 3 times daily as needed for cough  Qty: 30 capsule, Refills: 0      CALCIUM & MAGNESIUM CARBONATES PO       cephALEXin (KEFLEX) 500 MG capsule Take 1 capsule (500 mg) by mouth 2 times daily  Qty: 14 capsule, Refills: 0      Cholecalciferol (VITAMIN D) 2000 units tablet Take 1 tablet by mouth      ciprofloxacin (CIPRO) 500 MG tablet Take 1 tablet (500 mg) by mouth 2 times daily  Qty: 6 tablet, Refills: 0      COENZYME Q-10 PO       Escitalopram Oxalate (LEXAPRO PO) Take 20 mg by mouth daily      furosemide (LASIX) 20 MG tablet Take 25 mg by mouth daily      GABAPENTIN PO       losartan (COZAAR) 25 MG tablet Take 1 tablet (25 mg) by mouth daily for 30 days  Qty: 30 tablet, Refills: 0      meclizine (ANTIVERT) 25 MG tablet Take 1-2 tablets (25-50 mg) by mouth 3 times daily as needed for dizziness.  Qty: 30 tablet, Refills: 0      metoprolol succinate ER (TOPROL-XL) 25 MG 24 hr tablet TK 1 T PO  ONCE D  Refills: 0      Multiple Vitamins-Minerals (PRESERVISION AREDS PO) Take 1 tablet by mouth daily      omeprazole (PRILOSEC) 20 MG capsule Take 1 capsule by mouth 2 times daily.  Qty: 180 capsule, Refills: 3    Associated Diagnoses: Esophageal reflux      ORDER FOR DME Equipment being ordered: Nebulizer  Qty: 1 each, Refills: 0    Associated Diagnoses: Wheezing      phenazopyridine (PYRIDIUM) 200 MG tablet TK 1 T PO TID FOR 2 DAYS  Refills: 0      pramipexole (MIRAPEX) 0.5 MG tablet Take 2 mg by mouth At Bedtime      simvastatin  "(ZOCOR) 40 MG tablet Take 1 tablet by mouth At Bedtime.  Qty: 90 tablet, Refills: 3    Associated Diagnoses: Other and unspecified hyperlipidemia      Tiotropium Bromide Monohydrate (SPIRIVA HANDIHALER IN) Once daily inhale into lungs      traZODone (DESYREL) 100 MG tablet Take 1 tablet by mouth nightly as needed for sleep. As directed  Qty: 30 tablet, Refills: 5    Associated Diagnoses: Insomnia, unspecified      triamcinolone (KENALOG) 0.1 % external cream Apply topically 2 times daily  Qty: 80 g, Refills: 0    Associated Diagnoses: Venous stasis dermatitis of both lower extremities      vitamin D (ERGOCALCIFEROL) 37255 UNIT capsule Take 1 capsule (50,000 Units) by mouth once a week  Qty: 12 capsule, Refills: 1    Associated Diagnoses: Vitamin D deficiency disease           Allergies   Allergies   Allergen Reactions    Lyrica [Pregabalin]      \"felt like I was high and wanted to sleep a lot and did not help with the pain.\"    Malic Acid Other (See Comments)     Reaction: stomach cramps    Niacin      Rapid heartbeat    Seafood     Sulfa Antibiotics      Unknown    Tramadol      Dizziness     Azithromycin Rash    Naproxen Rash     Patient reported    Sulfamethoxazole-Trimethoprim Rash     "

## 2025-01-29 ENCOUNTER — TELEPHONE (OUTPATIENT)
Dept: FAMILY MEDICINE | Facility: CLINIC | Age: 85
End: 2025-01-29
Payer: COMMERCIAL

## 2025-01-29 NOTE — ED PROVIDER NOTES
Received notification from nursing staff that patient had called the emergency department requesting a change in her prescription.  In reviewing her medical records, she was admitted and discharged to the medicine service.  Discussed with nursing staff that she will need to communicate with the medicine team in regards to her discharged medications as these were prescribed by the medicine team and not the ED.     Cinthya Ravi PA-C  01/29/25 1628

## 2025-01-29 NOTE — TELEPHONE ENCOUNTER
Patient was discharged from [facility] 1/29/2025  Call was transferred to [place] nurse line  Patient requesting that all discharge medications be sent to [facility] vs [facility]- advised pt to call pharmacy & ask for them to be transferred then call back [facility] if any issues   Verbalized understanding

## 2025-01-30 ENCOUNTER — PATIENT OUTREACH (OUTPATIENT)
Dept: CARE COORDINATION | Facility: CLINIC | Age: 85
End: 2025-01-30
Payer: COMMERCIAL

## 2025-01-30 LAB
BACTERIA BLD CULT: NORMAL
BACTERIA BLD CULT: NORMAL

## 2025-01-30 NOTE — PROGRESS NOTES
"  [facility]: Transitions of Care Outreach  Chief Complaint   Patient presents with    Clinic Care Coordination - Post Hospital       Most Recent Admission Date: 1/27/2025   Most Recent Admission Diagnosis: Fever in adult - R50.9  Acute cough - R05.1  COPD exacerbation (H) - J44.1  Influenza A - J10.1  Hypoxia - R09.02     Most Recent Discharge Date: 1/29/2025   Most Recent Discharge Diagnosis: Fever in adult - R50.9  Acute cough - R05.1  COPD exacerbation (H) - J44.1  Influenza A - J10.1  Hypoxia - R09.02  COPD (chronic obstructive pulmonary disease) (H) - J44.9  Moderate persistent asthma, unspecified whether complicated - J45.40  Constipation, unspecified constipation type - K59.00     Transitions of Care Assessment    Discharge Assessment  How are you doing now that you are home?: \" I am not feeling well today \"  How are your symptoms? (Red Flag symptoms escalate to triage hotline per guidelines): Improved  Do you know how to contact your clinic care team if you have future questions or changes to your health status? : Yes  Does the patient have their discharge instructions? : Yes  Does the patient have questions regarding their discharge instructions? : No  Were you started on any new medications or were there changes to any of your previous medications? : Yes  Does the patient have all of their medications?: Yes  Do you have questions regarding any of your medications? : No  Do you have all of your needed medical supplies or equipment (DME)?  (i.e. oxygen tank, CPAP, cane, etc.): Yes    Post-op ([facility] Only)  If the patient had a surgery or procedure, do they have any questions for a nurse?: No           Follow up Plan     Discharge Follow-Up  Discharge follow up appointment scheduled in alignment with recommended follow up timeframe or Transitions of Risk Category? (Low = within 30 days; Moderate= within 14 days; High= within 7 days): No  Patient's follow up appointment not scheduled: " Patient declined scheduling support. Education on the importance of transitions of care follow up. Provided scheduling phone number.    No future appointments.    Outpatient Plan as outlined on AVS reviewed with patient.    For any urgent concerns, please contact our 24 hour nurse triage line: [phone] ([phone])       JEWELS Landry

## 2025-02-01 LAB
BACTERIA BLD CULT: NO GROWTH
BACTERIA BLD CULT: NO GROWTH

## 2025-03-23 ENCOUNTER — HEALTH MAINTENANCE LETTER (OUTPATIENT)
Age: 85
End: 2025-03-23

## 2025-06-14 ENCOUNTER — HEALTH MAINTENANCE LETTER (OUTPATIENT)
Age: 85
End: 2025-06-14

## (undated) RX ORDER — TRIAMCINOLONE ACETONIDE 40 MG/ML
INJECTION, SUSPENSION INTRA-ARTICULAR; INTRAMUSCULAR
Status: DISPENSED
Start: 2017-08-25

## (undated) RX ORDER — LIDOCAINE HYDROCHLORIDE 10 MG/ML
INJECTION, SOLUTION EPIDURAL; INFILTRATION; INTRACAUDAL; PERINEURAL
Status: DISPENSED
Start: 2019-02-06

## (undated) RX ORDER — TRIAMCINOLONE ACETONIDE 40 MG/ML
INJECTION, SUSPENSION INTRA-ARTICULAR; INTRAMUSCULAR
Status: DISPENSED
Start: 2018-01-12

## (undated) RX ORDER — LIDOCAINE HYDROCHLORIDE 10 MG/ML
INJECTION, SOLUTION INFILTRATION; PERINEURAL
Status: DISPENSED
Start: 2018-01-12

## (undated) RX ORDER — LIDOCAINE HYDROCHLORIDE 10 MG/ML
INJECTION, SOLUTION INFILTRATION; PERINEURAL
Status: DISPENSED
Start: 2017-07-19

## (undated) RX ORDER — TRIAMCINOLONE ACETONIDE 40 MG/ML
INJECTION, SUSPENSION INTRA-ARTICULAR; INTRAMUSCULAR
Status: DISPENSED
Start: 2018-04-20

## (undated) RX ORDER — TRIAMCINOLONE ACETONIDE 40 MG/ML
INJECTION, SUSPENSION INTRA-ARTICULAR; INTRAMUSCULAR
Status: DISPENSED
Start: 2018-10-10

## (undated) RX ORDER — LIDOCAINE HYDROCHLORIDE 10 MG/ML
INJECTION, SOLUTION INFILTRATION; PERINEURAL
Status: DISPENSED
Start: 2018-10-10

## (undated) RX ORDER — LIDOCAINE HYDROCHLORIDE 10 MG/ML
INJECTION, SOLUTION INFILTRATION; PERINEURAL
Status: DISPENSED
Start: 2019-09-03

## (undated) RX ORDER — TRIAMCINOLONE ACETONIDE 40 MG/ML
INJECTION, SUSPENSION INTRA-ARTICULAR; INTRAMUSCULAR
Status: DISPENSED
Start: 2019-02-06

## (undated) RX ORDER — TRIAMCINOLONE ACETONIDE 40 MG/ML
INJECTION, SUSPENSION INTRA-ARTICULAR; INTRAMUSCULAR
Status: DISPENSED
Start: 2019-09-03

## (undated) RX ORDER — LIDOCAINE HYDROCHLORIDE 10 MG/ML
INJECTION, SOLUTION INFILTRATION; PERINEURAL
Status: DISPENSED
Start: 2018-04-20

## (undated) RX ORDER — LIDOCAINE HYDROCHLORIDE 10 MG/ML
INJECTION, SOLUTION INFILTRATION; PERINEURAL
Status: DISPENSED
Start: 2017-08-25

## (undated) RX ORDER — TRIAMCINOLONE ACETONIDE 40 MG/ML
INJECTION, SUSPENSION INTRA-ARTICULAR; INTRAMUSCULAR
Status: DISPENSED
Start: 2017-07-19